# Patient Record
Sex: FEMALE | Race: BLACK OR AFRICAN AMERICAN | NOT HISPANIC OR LATINO | Employment: FULL TIME | URBAN - METROPOLITAN AREA
[De-identification: names, ages, dates, MRNs, and addresses within clinical notes are randomized per-mention and may not be internally consistent; named-entity substitution may affect disease eponyms.]

---

## 2017-01-23 ENCOUNTER — HISTORICAL (OUTPATIENT)
Dept: INTERNAL MEDICINE | Facility: CLINIC | Age: 57
End: 2017-01-23

## 2017-03-10 ENCOUNTER — HISTORICAL (OUTPATIENT)
Dept: INTERNAL MEDICINE | Facility: CLINIC | Age: 57
End: 2017-03-10

## 2017-06-23 ENCOUNTER — HISTORICAL (OUTPATIENT)
Dept: ADMINISTRATIVE | Facility: HOSPITAL | Age: 57
End: 2017-06-23

## 2017-06-23 LAB
ABS NEUT (OLG): 2.98 X10(3)/MCL (ref 2.1–9.2)
BASOPHILS # BLD AUTO: 0.04 X10(3)/MCL
BASOPHILS NFR BLD AUTO: 1 % (ref 0–1)
CHOLEST SERPL-MCNC: 202 MG/DL
CHOLEST/HDLC SERPL: 5.9 {RATIO} (ref 0–4.4)
DEPRECATED CALCIDIOL+CALCIFEROL SERPL-MC: 27.01 NG/ML (ref 30–80)
EOSINOPHIL # BLD AUTO: 0.05 10*3/UL
EOSINOPHIL NFR BLD AUTO: 1 % (ref 0–5)
ERYTHROCYTE [DISTWIDTH] IN BLOOD BY AUTOMATED COUNT: 13.4 % (ref 11.5–14.5)
HCT VFR BLD AUTO: 43 % (ref 35–46)
HDLC SERPL-MCNC: 34 MG/DL
HGB BLD-MCNC: 15.1 GM/DL (ref 12–16)
IMM GRANULOCYTES # BLD AUTO: 0.02 10*3/UL
IMM GRANULOCYTES NFR BLD AUTO: 0 %
LDLC SERPL CALC-MCNC: 96 MG/DL (ref 0–130)
LYMPHOCYTES # BLD AUTO: 2.6 X10(3)/MCL
LYMPHOCYTES NFR BLD AUTO: 42 % (ref 15–40)
MCH RBC QN AUTO: 27.9 PG (ref 26–34)
MCHC RBC AUTO-ENTMCNC: 35.1 GM/DL (ref 31–37)
MCV RBC AUTO: 79.5 FL (ref 80–100)
MONOCYTES # BLD AUTO: 0.47 X10(3)/MCL
MONOCYTES NFR BLD AUTO: 8 % (ref 4–12)
NEUTROPHILS # BLD AUTO: 2.98 X10(3)/MCL
NEUTROPHILS NFR BLD AUTO: 48 X10(3)/MCL
PLATELET # BLD AUTO: 421 X10(3)/MCL (ref 130–400)
PMV BLD AUTO: 9.1 FL (ref 7.4–10.4)
RBC # BLD AUTO: 5.41 X10(6)/MCL (ref 4–5.2)
TRIGL SERPL-MCNC: 358 MG/DL
VLDLC SERPL CALC-MCNC: 72 MG/DL
WBC # SPEC AUTO: 6.2 X10(3)/MCL (ref 4.5–11)

## 2017-07-03 ENCOUNTER — HISTORICAL (OUTPATIENT)
Dept: RADIOLOGY | Facility: HOSPITAL | Age: 57
End: 2017-07-03

## 2017-07-03 LAB
BUN SERPL-MCNC: 18 MG/DL (ref 7–18)
CALCIUM SERPL-MCNC: 9.2 MG/DL (ref 8.5–10.1)
CHLORIDE SERPL-SCNC: 108 MMOL/L (ref 98–107)
CO2 SERPL-SCNC: 26 MMOL/L (ref 21–32)
CREAT SERPL-MCNC: 0.8 MG/DL (ref 0.6–1.3)
GLUCOSE SERPL-MCNC: 109 MG/DL (ref 74–106)
POTASSIUM SERPL-SCNC: 3.4 MMOL/L (ref 3.5–5.1)
SODIUM SERPL-SCNC: 143 MMOL/L (ref 136–145)

## 2017-08-09 ENCOUNTER — HISTORICAL (OUTPATIENT)
Dept: INTERNAL MEDICINE | Facility: CLINIC | Age: 57
End: 2017-08-09

## 2017-08-09 LAB — DEPRECATED CALCIDIOL+CALCIFEROL SERPL-MC: 21.68 NG/ML (ref 30–80)

## 2017-09-27 ENCOUNTER — HISTORICAL (OUTPATIENT)
Dept: INTERNAL MEDICINE | Facility: CLINIC | Age: 57
End: 2017-09-27

## 2017-09-27 LAB
ABS NEUT (OLG): 3.08 X10(3)/MCL (ref 2.1–9.2)
ALBUMIN SERPL-MCNC: 3.6 GM/DL (ref 3.4–5)
ALBUMIN/GLOB SERPL: 1 RATIO (ref 1–2)
ALP SERPL-CCNC: 56 UNIT/L (ref 45–117)
ALT SERPL-CCNC: 20 UNIT/L (ref 12–78)
AST SERPL-CCNC: 13 UNIT/L (ref 15–37)
BASOPHILS # BLD AUTO: 0.05 X10(3)/MCL
BASOPHILS NFR BLD AUTO: 1 % (ref 0–1)
BILIRUB SERPL-MCNC: 0.4 MG/DL (ref 0.2–1)
BILIRUBIN DIRECT+TOT PNL SERPL-MCNC: 0.1 MG/DL
BILIRUBIN DIRECT+TOT PNL SERPL-MCNC: 0.3 MG/DL
BUN SERPL-MCNC: 15 MG/DL (ref 7–18)
CALCIUM SERPL-MCNC: 8.9 MG/DL (ref 8.5–10.1)
CHLORIDE SERPL-SCNC: 106 MMOL/L (ref 98–107)
CHOLEST SERPL-MCNC: 190 MG/DL
CHOLEST/HDLC SERPL: 4.8 {RATIO} (ref 0–4.4)
CO2 SERPL-SCNC: 33 MMOL/L (ref 21–32)
CREAT SERPL-MCNC: 0.7 MG/DL (ref 0.6–1.3)
DEPRECATED CALCIDIOL+CALCIFEROL SERPL-MC: 40.9 NG/ML (ref 30–80)
EOSINOPHIL # BLD AUTO: 0.09 X10(3)/MCL
EOSINOPHIL NFR BLD AUTO: 1 % (ref 0–5)
ERYTHROCYTE [DISTWIDTH] IN BLOOD BY AUTOMATED COUNT: 13.6 % (ref 11.5–14.5)
GLOBULIN SER-MCNC: 4.4 GM/ML (ref 2.3–3.5)
GLUCOSE SERPL-MCNC: 105 MG/DL (ref 74–106)
HCT VFR BLD AUTO: 39 % (ref 35–46)
HDLC SERPL-MCNC: 40 MG/DL
HGB BLD-MCNC: 14 GM/DL (ref 12–16)
IMM GRANULOCYTES # BLD AUTO: 0.01 10*3/UL
IMM GRANULOCYTES NFR BLD AUTO: 0 %
LDLC SERPL CALC-MCNC: 98 MG/DL (ref 0–130)
LYMPHOCYTES # BLD AUTO: 3.18 X10(3)/MCL
LYMPHOCYTES NFR BLD AUTO: 45 % (ref 15–40)
MCH RBC QN AUTO: 28.7 PG (ref 26–34)
MCHC RBC AUTO-ENTMCNC: 35.9 GM/DL (ref 31–37)
MCV RBC AUTO: 79.9 FL (ref 80–100)
MONOCYTES # BLD AUTO: 0.61 X10(3)/MCL
MONOCYTES NFR BLD AUTO: 9 % (ref 4–12)
NEUTROPHILS # BLD AUTO: 3.08 X10(3)/MCL
NEUTROPHILS NFR BLD AUTO: 44 X10(3)/MCL
PLATELET # BLD AUTO: 376 X10(3)/MCL (ref 130–400)
PMV BLD AUTO: 9.1 FL (ref 7.4–10.4)
POTASSIUM SERPL-SCNC: 3.4 MMOL/L (ref 3.5–5.1)
PROT SERPL-MCNC: 8 GM/DL (ref 6.4–8.2)
RBC # BLD AUTO: 4.88 X10(6)/MCL (ref 4–5.2)
SODIUM SERPL-SCNC: 143 MMOL/L (ref 136–145)
TRIGL SERPL-MCNC: 258 MG/DL
VLDLC SERPL CALC-MCNC: 52 MG/DL
WBC # SPEC AUTO: 7 X10(3)/MCL (ref 4.5–11)

## 2017-11-16 ENCOUNTER — HISTORICAL (OUTPATIENT)
Dept: RADIOLOGY | Facility: HOSPITAL | Age: 57
End: 2017-11-16

## 2017-11-16 LAB
ALBUMIN SERPL-MCNC: 3.5 GM/DL (ref 3.4–5)
ALBUMIN/GLOB SERPL: 1 RATIO (ref 1–2)
ALP SERPL-CCNC: 52 UNIT/L (ref 45–117)
ALT SERPL-CCNC: 21 UNIT/L (ref 12–78)
AST SERPL-CCNC: 12 UNIT/L (ref 15–37)
BILIRUB SERPL-MCNC: 0.4 MG/DL (ref 0.2–1)
BILIRUBIN DIRECT+TOT PNL SERPL-MCNC: 0.1 MG/DL
BILIRUBIN DIRECT+TOT PNL SERPL-MCNC: 0.3 MG/DL
BUN SERPL-MCNC: 15 MG/DL (ref 7–18)
CALCIUM SERPL-MCNC: 9.3 MG/DL (ref 8.5–10.1)
CHLORIDE SERPL-SCNC: 106 MMOL/L (ref 98–107)
CO2 SERPL-SCNC: 30 MMOL/L (ref 21–32)
CREAT SERPL-MCNC: 0.7 MG/DL (ref 0.6–1.3)
DEPRECATED CALCIDIOL+CALCIFEROL SERPL-MC: 38.83 NG/ML (ref 30–80)
GLOBULIN SER-MCNC: 4.2 GM/ML (ref 2.3–3.5)
GLUCOSE SERPL-MCNC: 84 MG/DL (ref 74–106)
POTASSIUM SERPL-SCNC: 3.3 MMOL/L (ref 3.5–5.1)
PROT SERPL-MCNC: 7.7 GM/DL (ref 6.4–8.2)
SODIUM SERPL-SCNC: 143 MMOL/L (ref 136–145)

## 2018-02-07 ENCOUNTER — HISTORICAL (OUTPATIENT)
Dept: INTERNAL MEDICINE | Facility: CLINIC | Age: 58
End: 2018-02-07

## 2018-02-07 LAB
BUN SERPL-MCNC: 19 MG/DL (ref 7–18)
CALCIUM SERPL-MCNC: 8.9 MG/DL (ref 8.5–10.1)
CHLORIDE SERPL-SCNC: 106 MMOL/L (ref 98–107)
CO2 SERPL-SCNC: 33 MMOL/L (ref 21–32)
CREAT SERPL-MCNC: 0.9 MG/DL (ref 0.6–1.3)
GLUCOSE SERPL-MCNC: 102 MG/DL (ref 74–106)
POTASSIUM SERPL-SCNC: 3.2 MMOL/L (ref 3.5–5.1)
SODIUM SERPL-SCNC: 142 MMOL/L (ref 136–145)

## 2018-04-19 ENCOUNTER — HISTORICAL (OUTPATIENT)
Dept: ADMINISTRATIVE | Facility: HOSPITAL | Age: 58
End: 2018-04-19

## 2018-05-22 ENCOUNTER — HISTORICAL (OUTPATIENT)
Dept: ADMINISTRATIVE | Facility: HOSPITAL | Age: 58
End: 2018-05-22

## 2018-08-08 ENCOUNTER — HISTORICAL (OUTPATIENT)
Dept: INTERNAL MEDICINE | Facility: CLINIC | Age: 58
End: 2018-08-08

## 2018-08-08 ENCOUNTER — HISTORICAL (OUTPATIENT)
Dept: ADMINISTRATIVE | Facility: HOSPITAL | Age: 58
End: 2018-08-08

## 2018-08-08 LAB
BUN SERPL-MCNC: 15 MG/DL (ref 7–18)
CALCIUM SERPL-MCNC: 8.7 MG/DL (ref 8.5–10.1)
CHLORIDE SERPL-SCNC: 106 MMOL/L (ref 98–107)
CO2 SERPL-SCNC: 30 MMOL/L (ref 21–32)
CREAT SERPL-MCNC: 0.8 MG/DL (ref 0.6–1.3)
CREAT/UREA NIT SERPL: 19
CRP SERPL-MCNC: 0.8 MG/DL
ERYTHROCYTE [SEDIMENTATION RATE] IN BLOOD: 10 MM/HR (ref 0–20)
GLUCOSE SERPL-MCNC: 103 MG/DL (ref 74–106)
POTASSIUM SERPL-SCNC: 3.6 MMOL/L (ref 3.5–5.1)
RHEUMATOID FACT SERPL-ACNC: <10 IU/ML (ref 0–15)
SODIUM SERPL-SCNC: 143 MMOL/L (ref 136–145)

## 2018-11-19 ENCOUNTER — HISTORICAL (OUTPATIENT)
Dept: RADIOLOGY | Facility: HOSPITAL | Age: 58
End: 2018-11-19

## 2018-11-30 ENCOUNTER — HISTORICAL (OUTPATIENT)
Dept: INTERNAL MEDICINE | Facility: CLINIC | Age: 58
End: 2018-11-30

## 2018-11-30 LAB
ABS NEUT (OLG): 4.02 X10(3)/MCL (ref 2.1–9.2)
ALBUMIN SERPL-MCNC: 3.5 GM/DL (ref 3.4–5)
ALBUMIN/GLOB SERPL: 1 RATIO (ref 1–2)
ALP SERPL-CCNC: 59 UNIT/L (ref 45–117)
ALT SERPL-CCNC: 24 UNIT/L (ref 12–78)
APPEARANCE, UA: CLEAR
AST SERPL-CCNC: 18 UNIT/L (ref 15–37)
BACTERIA #/AREA URNS AUTO: ABNORMAL /[HPF]
BASOPHILS # BLD AUTO: 0.05 X10(3)/MCL
BASOPHILS NFR BLD AUTO: 1 %
BILIRUB SERPL-MCNC: 0.3 MG/DL (ref 0.2–1)
BILIRUB UR QL STRIP: NEGATIVE
BILIRUBIN DIRECT+TOT PNL SERPL-MCNC: <0.1 MG/DL
BILIRUBIN DIRECT+TOT PNL SERPL-MCNC: >0.2 MG/DL
BUN SERPL-MCNC: 12 MG/DL (ref 7–18)
CALCIUM SERPL-MCNC: 8.8 MG/DL (ref 8.5–10.1)
CHLORIDE SERPL-SCNC: 106 MMOL/L (ref 98–107)
CHOLEST SERPL-MCNC: 192 MG/DL
CHOLEST/HDLC SERPL: 5.2 {RATIO} (ref 0–4.4)
CO2 SERPL-SCNC: 28 MMOL/L (ref 21–32)
COLOR UR: ABNORMAL
CREAT SERPL-MCNC: 0.9 MG/DL (ref 0.6–1.3)
DEPRECATED CALCIDIOL+CALCIFEROL SERPL-MC: 50.4 NG/ML (ref 30–80)
EOSINOPHIL # BLD AUTO: 0.12 X10(3)/MCL
EOSINOPHIL NFR BLD AUTO: 2 %
ERYTHROCYTE [DISTWIDTH] IN BLOOD BY AUTOMATED COUNT: 13.3 % (ref 11.5–14.5)
GLOBULIN SER-MCNC: 4.5 GM/ML (ref 2.3–3.5)
GLUCOSE (UA): NORMAL
GLUCOSE SERPL-MCNC: 131 MG/DL (ref 74–106)
HCT VFR BLD AUTO: 38.5 % (ref 35–46)
HDLC SERPL-MCNC: 37 MG/DL
HGB BLD-MCNC: 13.7 GM/DL (ref 12–16)
HGB UR QL STRIP: 0.5 MG/DL
HYALINE CASTS #/AREA URNS LPF: ABNORMAL /[LPF]
IMM GRANULOCYTES # BLD AUTO: 0.03 10*3/UL
IMM GRANULOCYTES NFR BLD AUTO: 0 %
KETONES UR QL STRIP: NEGATIVE
LDLC SERPL CALC-MCNC: 112 MG/DL (ref 0–130)
LEUKOCYTE ESTERASE UR QL STRIP: NEGATIVE
LYMPHOCYTES # BLD AUTO: 2.97 X10(3)/MCL
LYMPHOCYTES NFR BLD AUTO: 38 % (ref 13–40)
MCH RBC QN AUTO: 28.7 PG (ref 26–34)
MCHC RBC AUTO-ENTMCNC: 35.6 GM/DL (ref 31–37)
MCV RBC AUTO: 80.7 FL (ref 80–100)
MONOCYTES # BLD AUTO: 0.56 X10(3)/MCL
MONOCYTES NFR BLD AUTO: 7 % (ref 4–12)
NEUTROPHILS # BLD AUTO: 4.02 X10(3)/MCL
NEUTROPHILS NFR BLD AUTO: 52 X10(3)/MCL
NITRITE UR QL STRIP: NEGATIVE
PH UR STRIP: 6 [PH] (ref 4.5–8)
PLATELET # BLD AUTO: 373 X10(3)/MCL (ref 130–400)
PMV BLD AUTO: 9.3 FL (ref 7.4–10.4)
POTASSIUM SERPL-SCNC: 3 MMOL/L (ref 3.5–5.1)
PROT SERPL-MCNC: 8 GM/DL (ref 6.4–8.2)
PROT UR QL STRIP: 100 MG/DL
RBC # BLD AUTO: 4.77 X10(6)/MCL (ref 4–5.2)
RBC #/AREA URNS AUTO: ABNORMAL /[HPF]
SODIUM SERPL-SCNC: 140 MMOL/L (ref 136–145)
SP GR UR STRIP: 1.01 (ref 1–1.03)
SQUAMOUS #/AREA URNS LPF: ABNORMAL /[LPF]
TRIGL SERPL-MCNC: 215 MG/DL
UROBILINOGEN UR STRIP-ACNC: NORMAL
VLDLC SERPL CALC-MCNC: 43 MG/DL
WBC # SPEC AUTO: 7.8 X10(3)/MCL (ref 4.5–11)
WBC #/AREA URNS AUTO: ABNORMAL /HPF

## 2019-05-06 ENCOUNTER — HISTORICAL (OUTPATIENT)
Dept: ADMINISTRATIVE | Facility: HOSPITAL | Age: 59
End: 2019-05-06

## 2019-05-06 LAB
BUN SERPL-MCNC: 17 MG/DL (ref 7–18)
CALCIUM SERPL-MCNC: 9.2 MG/DL (ref 8.5–10.1)
CHLORIDE SERPL-SCNC: 108 MMOL/L (ref 98–107)
CO2 SERPL-SCNC: 28 MMOL/L (ref 21–32)
CREAT SERPL-MCNC: 0.7 MG/DL (ref 0.6–1.3)
CREAT/UREA NIT SERPL: 24
GLUCOSE SERPL-MCNC: 89 MG/DL (ref 74–106)
POTASSIUM SERPL-SCNC: 3.7 MMOL/L (ref 3.5–5.1)
SODIUM SERPL-SCNC: 142 MMOL/L (ref 136–145)

## 2019-05-21 ENCOUNTER — HISTORICAL (OUTPATIENT)
Dept: RADIOLOGY | Facility: HOSPITAL | Age: 59
End: 2019-05-21

## 2019-10-21 ENCOUNTER — HISTORICAL (OUTPATIENT)
Dept: ENDOSCOPY | Facility: HOSPITAL | Age: 59
End: 2019-10-21

## 2019-10-21 LAB — CRC RECOMMENDATION EXT: NORMAL

## 2019-11-21 ENCOUNTER — HISTORICAL (OUTPATIENT)
Dept: RADIOLOGY | Facility: HOSPITAL | Age: 59
End: 2019-11-21

## 2019-12-05 ENCOUNTER — HISTORICAL (OUTPATIENT)
Dept: INTERNAL MEDICINE | Facility: CLINIC | Age: 59
End: 2019-12-05

## 2019-12-05 LAB
ABS NEUT (OLG): 2.47 X10(3)/MCL (ref 2.1–9.2)
APPEARANCE, UA: CLEAR
BACTERIA #/AREA URNS AUTO: ABNORMAL /HPF
BASOPHILS # BLD AUTO: 0 X10(3)/MCL (ref 0–0.2)
BASOPHILS NFR BLD AUTO: 1 %
BILIRUB UR QL STRIP: NEGATIVE
CHOLEST SERPL-MCNC: 180 MG/DL
CHOLEST/HDLC SERPL: 5.3 {RATIO} (ref 0–4.4)
COLOR UR: ABNORMAL
DEPRECATED CALCIDIOL+CALCIFEROL SERPL-MC: 35.95 NG/ML (ref 30–80)
EOSINOPHIL # BLD AUTO: 0.1 X10(3)/MCL (ref 0–0.9)
EOSINOPHIL NFR BLD AUTO: 1 %
ERYTHROCYTE [DISTWIDTH] IN BLOOD BY AUTOMATED COUNT: 13.7 % (ref 11.5–14.5)
EST. AVERAGE GLUCOSE BLD GHB EST-MCNC: 126 MG/DL
GLUCOSE (UA): NEGATIVE
HBA1C MFR BLD: 6 % (ref 4.2–6.3)
HCT VFR BLD AUTO: 40.1 % (ref 35–46)
HDLC SERPL-MCNC: 34 MG/DL (ref 40–59)
HGB BLD-MCNC: 14 GM/DL (ref 12–16)
HGB UR QL STRIP: 0.2
HYALINE CASTS #/AREA URNS LPF: ABNORMAL /LPF
IMM GRANULOCYTES # BLD AUTO: 0.02 10*3/UL
IMM GRANULOCYTES NFR BLD AUTO: 0 %
KETONES UR QL STRIP: NEGATIVE
LDLC SERPL CALC-MCNC: 72 MG/DL
LEUKOCYTE ESTERASE UR QL STRIP: NEGATIVE
LYMPHOCYTES # BLD AUTO: 3.2 X10(3)/MCL (ref 0.6–4.6)
LYMPHOCYTES NFR BLD AUTO: 50 %
MCH RBC QN AUTO: 28.5 PG (ref 26–34)
MCHC RBC AUTO-ENTMCNC: 34.9 GM/DL (ref 31–37)
MCV RBC AUTO: 81.5 FL (ref 80–100)
MONOCYTES # BLD AUTO: 0.7 X10(3)/MCL (ref 0.1–1.3)
MONOCYTES NFR BLD AUTO: 10 %
NEUTROPHILS # BLD AUTO: 2.47 X10(3)/MCL (ref 2.1–9.2)
NEUTROPHILS NFR BLD AUTO: 38 %
NITRITE UR QL STRIP: NEGATIVE
PH UR STRIP: 6.5 [PH] (ref 4.5–8)
PLATELET # BLD AUTO: 384 X10(3)/MCL (ref 130–400)
PMV BLD AUTO: 9.3 FL (ref 7.4–10.4)
PROT UR QL STRIP: 100 MG/DL
RBC # BLD AUTO: 4.92 X10(6)/MCL (ref 4–5.2)
RBC #/AREA URNS AUTO: ABNORMAL /HPF
SP GR UR STRIP: 1.01 (ref 1–1.03)
SQUAMOUS #/AREA URNS LPF: ABNORMAL /LPF
TRIGL SERPL-MCNC: 372 MG/DL
TSH SERPL-ACNC: 1.66 MIU/L (ref 0.36–3.74)
UROBILINOGEN UR STRIP-ACNC: NORMAL
VLDLC SERPL CALC-MCNC: 74 MG/DL
WBC # SPEC AUTO: 6.5 X10(3)/MCL (ref 4.5–11)
WBC #/AREA URNS AUTO: ABNORMAL /HPF

## 2020-03-05 ENCOUNTER — HISTORICAL (OUTPATIENT)
Dept: INTERNAL MEDICINE | Facility: CLINIC | Age: 60
End: 2020-03-05

## 2020-03-05 LAB
APPEARANCE, UA: CLEAR
BACTERIA #/AREA URNS AUTO: ABNORMAL /HPF
BILIRUB UR QL STRIP: NEGATIVE
CHOLEST SERPL-MCNC: 198 MG/DL
CHOLEST/HDLC SERPL: 4.8 {RATIO} (ref 0–4.4)
COLOR UR: ABNORMAL
EST. AVERAGE GLUCOSE BLD GHB EST-MCNC: 111 MG/DL
GLUCOSE (UA): NEGATIVE
HBA1C MFR BLD: 5.5 % (ref 4.2–6.3)
HDLC SERPL-MCNC: 41 MG/DL (ref 40–59)
HGB UR QL STRIP: 0.2
HYALINE CASTS #/AREA URNS LPF: ABNORMAL /LPF
KETONES UR QL STRIP: NEGATIVE
LDLC SERPL CALC-MCNC: 118 MG/DL
LEUKOCYTE ESTERASE UR QL STRIP: NEGATIVE
NITRITE UR QL STRIP: NEGATIVE
PH UR STRIP: 6.5 [PH] (ref 4.5–8)
PROT UR QL STRIP: 200 MG/DL
RBC #/AREA URNS AUTO: ABNORMAL /HPF
SP GR UR STRIP: 1.01 (ref 1–1.03)
SQUAMOUS #/AREA URNS LPF: ABNORMAL /LPF
TRIGL SERPL-MCNC: 194 MG/DL
UROBILINOGEN UR STRIP-ACNC: NORMAL
VLDLC SERPL CALC-MCNC: 39 MG/DL
WBC #/AREA URNS AUTO: ABNORMAL /HPF

## 2020-03-07 LAB — FINAL CULTURE: NO GROWTH

## 2020-03-10 ENCOUNTER — HISTORICAL (OUTPATIENT)
Dept: ADMINISTRATIVE | Facility: HOSPITAL | Age: 60
End: 2020-03-10

## 2020-05-19 ENCOUNTER — HISTORICAL (OUTPATIENT)
Dept: INTERNAL MEDICINE | Facility: CLINIC | Age: 60
End: 2020-05-19

## 2020-05-19 LAB
ALBUMIN SERPL-MCNC: 3.2 GM/DL (ref 3.4–5)
ALBUMIN/GLOB SERPL: 0.7 RATIO (ref 1.1–2)
ALP SERPL-CCNC: 49 UNIT/L (ref 45–117)
ALT SERPL-CCNC: 19 UNIT/L (ref 12–78)
AST SERPL-CCNC: 12 UNIT/L (ref 15–37)
BILIRUB SERPL-MCNC: 0.5 MG/DL (ref 0.2–1)
BILIRUBIN DIRECT+TOT PNL SERPL-MCNC: <0.1 MG/DL (ref 0–0.2)
BILIRUBIN DIRECT+TOT PNL SERPL-MCNC: ABNORMAL MG/DL
BUN SERPL-MCNC: 24 MG/DL (ref 7–18)
CALCIUM SERPL-MCNC: 9.1 MG/DL (ref 8.5–10.1)
CHLORIDE SERPL-SCNC: 109 MMOL/L (ref 98–107)
CHOLEST SERPL-MCNC: 169 MG/DL
CHOLEST/HDLC SERPL: 5.3 {RATIO} (ref 0–4.4)
CO2 SERPL-SCNC: 27 MMOL/L (ref 21–32)
CREAT SERPL-MCNC: 0.8 MG/DL (ref 0.6–1.3)
GLOBULIN SER-MCNC: 4.3 GM/ML (ref 2.3–3.5)
GLUCOSE SERPL-MCNC: 103 MG/DL (ref 74–106)
HDLC SERPL-MCNC: 32 MG/DL (ref 40–59)
LDLC SERPL CALC-MCNC: 89 MG/DL
POTASSIUM SERPL-SCNC: 3.5 MMOL/L (ref 3.5–5.1)
PROT SERPL-MCNC: 7.5 GM/DL (ref 6.4–8.2)
SODIUM SERPL-SCNC: 142 MMOL/L (ref 136–145)
TRIGL SERPL-MCNC: 239 MG/DL
VLDLC SERPL CALC-MCNC: 48 MG/DL

## 2020-05-20 ENCOUNTER — HISTORICAL (OUTPATIENT)
Dept: ADMINISTRATIVE | Facility: HOSPITAL | Age: 60
End: 2020-05-20

## 2020-05-20 LAB
APPEARANCE, UA: CLEAR
BACTERIA #/AREA URNS AUTO: ABNORMAL /HPF
BILIRUB UR QL STRIP: NEGATIVE
COLOR UR: ABNORMAL
EST. AVERAGE GLUCOSE BLD GHB EST-MCNC: 134 MG/DL
GLUCOSE (UA): NEGATIVE
HBA1C MFR BLD: 6.3 % (ref 4.2–6.3)
HGB UR QL STRIP: 0.2
HYALINE CASTS #/AREA URNS LPF: ABNORMAL /LPF
KETONES UR QL STRIP: NEGATIVE
LEUKOCYTE ESTERASE UR QL STRIP: NEGATIVE
NITRITE UR QL STRIP: NEGATIVE
PH UR STRIP: 5.5 [PH] (ref 4.5–8)
PROT UR QL STRIP: 30 MG/DL
RBC #/AREA URNS AUTO: ABNORMAL /HPF
SP GR UR STRIP: 1.01 (ref 1–1.03)
SQUAMOUS #/AREA URNS LPF: ABNORMAL /LPF
UROBILINOGEN UR STRIP-ACNC: NORMAL
WBC #/AREA URNS AUTO: ABNORMAL /HPF

## 2020-05-22 LAB — FINAL CULTURE: NO GROWTH

## 2020-06-01 LAB
HPV16+18+H RISK 12 DNA CVX-IMP: NEGATIVE
PAP RECOMMENDATION EXT: NORMAL
PAP SMEAR: NORMAL

## 2020-06-11 ENCOUNTER — HISTORICAL (OUTPATIENT)
Dept: RADIOLOGY | Facility: HOSPITAL | Age: 60
End: 2020-06-11

## 2020-08-11 ENCOUNTER — HISTORICAL (OUTPATIENT)
Dept: WOUND CARE | Facility: HOSPITAL | Age: 60
End: 2020-08-11

## 2020-08-18 ENCOUNTER — HISTORICAL (OUTPATIENT)
Dept: INTERNAL MEDICINE | Facility: CLINIC | Age: 60
End: 2020-08-18

## 2020-08-18 LAB
BUN SERPL-MCNC: 15 MG/DL (ref 7–18)
CALCIUM SERPL-MCNC: 9.5 MG/DL (ref 8.5–10.1)
CHLORIDE SERPL-SCNC: 110 MMOL/L (ref 98–107)
CHOLEST SERPL-MCNC: 186 MG/DL
CHOLEST/HDLC SERPL: 5.8 {RATIO} (ref 0–4.4)
CO2 SERPL-SCNC: 26 MMOL/L (ref 21–32)
CREAT SERPL-MCNC: 0.7 MG/DL (ref 0.6–1.3)
CREAT/UREA NIT SERPL: 21 MG/DL (ref 12–14)
GLUCOSE SERPL-MCNC: 112 MG/DL (ref 74–106)
HDLC SERPL-MCNC: 32 MG/DL (ref 40–59)
LDLC SERPL CALC-MCNC: 106 MG/DL
POTASSIUM SERPL-SCNC: 3.6 MMOL/L (ref 3.5–5.1)
SODIUM SERPL-SCNC: 143 MMOL/L (ref 136–145)
TRIGL SERPL-MCNC: 242 MG/DL
VLDLC SERPL CALC-MCNC: 48 MG/DL

## 2020-11-17 ENCOUNTER — HISTORICAL (OUTPATIENT)
Dept: INTERNAL MEDICINE | Facility: CLINIC | Age: 60
End: 2020-11-17

## 2020-11-17 LAB
BUN SERPL-MCNC: 16 MG/DL (ref 9.8–20.1)
CALCIUM SERPL-MCNC: 9.1 MG/DL (ref 8.4–10.2)
CHLORIDE SERPL-SCNC: 103 MMOL/L (ref 98–107)
CHOLEST SERPL-MCNC: 179 MG/DL
CHOLEST/HDLC SERPL: 5 {RATIO} (ref 0–5)
CO2 SERPL-SCNC: 29 MMOL/L (ref 23–31)
CREAT SERPL-MCNC: 0.82 MG/DL (ref 0.55–1.02)
CREAT/UREA NIT SERPL: 20
EST. AVERAGE GLUCOSE BLD GHB EST-MCNC: 111.2 MG/DL
GLUCOSE SERPL-MCNC: 100 MG/DL (ref 82–115)
HBA1C MFR BLD: 5.5 %
HDLC SERPL-MCNC: 35 MG/DL (ref 35–60)
LDLC SERPL CALC-MCNC: 100 MG/DL (ref 50–140)
POTASSIUM SERPL-SCNC: 3.6 MMOL/L (ref 3.5–5.1)
SODIUM SERPL-SCNC: 140 MMOL/L (ref 136–145)
TRIGL SERPL-MCNC: 218 MG/DL (ref 37–140)
VLDLC SERPL CALC-MCNC: 44 MG/DL

## 2020-11-23 ENCOUNTER — HISTORICAL (OUTPATIENT)
Dept: RADIOLOGY | Facility: HOSPITAL | Age: 60
End: 2020-11-23

## 2021-02-17 ENCOUNTER — HISTORICAL (OUTPATIENT)
Dept: INTERNAL MEDICINE | Facility: CLINIC | Age: 61
End: 2021-02-17

## 2021-02-17 LAB
ABS NEUT (OLG): 2.57 X10(3)/MCL (ref 2.1–9.2)
BASOPHILS # BLD AUTO: 0 X10(3)/MCL (ref 0–0.2)
BASOPHILS NFR BLD AUTO: 1 %
BUN SERPL-MCNC: 16 MG/DL (ref 9.8–20.1)
CALCIUM SERPL-MCNC: 9.4 MG/DL (ref 8.4–10.2)
CHLORIDE SERPL-SCNC: 105 MMOL/L (ref 98–107)
CHOLEST SERPL-MCNC: 199 MG/DL
CHOLEST/HDLC SERPL: 5 {RATIO} (ref 0–5)
CO2 SERPL-SCNC: 26 MMOL/L (ref 23–31)
CREAT SERPL-MCNC: 0.73 MG/DL (ref 0.55–1.02)
CREAT/UREA NIT SERPL: 22
EOSINOPHIL # BLD AUTO: 0 X10(3)/MCL (ref 0–0.9)
EOSINOPHIL NFR BLD AUTO: 1 %
ERYTHROCYTE [DISTWIDTH] IN BLOOD BY AUTOMATED COUNT: 13.9 % (ref 11.5–14.5)
GLUCOSE SERPL-MCNC: 95 MG/DL (ref 82–115)
HCT VFR BLD AUTO: 41.7 % (ref 35–46)
HDLC SERPL-MCNC: 38 MG/DL (ref 35–60)
HGB BLD-MCNC: 14.3 GM/DL (ref 12–16)
IMM GRANULOCYTES # BLD AUTO: 0.02 10*3/UL
IMM GRANULOCYTES NFR BLD AUTO: 0 %
LDLC SERPL CALC-MCNC: 110 MG/DL (ref 50–140)
LYMPHOCYTES # BLD AUTO: 3.1 X10(3)/MCL (ref 0.6–4.6)
LYMPHOCYTES NFR BLD AUTO: 49 %
MCH RBC QN AUTO: 28.2 PG (ref 26–34)
MCHC RBC AUTO-ENTMCNC: 34.3 GM/DL (ref 31–37)
MCV RBC AUTO: 82.2 FL (ref 80–100)
MONOCYTES # BLD AUTO: 0.6 X10(3)/MCL (ref 0.1–1.3)
MONOCYTES NFR BLD AUTO: 9 %
NEUTROPHILS # BLD AUTO: 2.57 X10(3)/MCL (ref 2.1–9.2)
NEUTROPHILS NFR BLD AUTO: 40 %
PLATELET # BLD AUTO: 392 X10(3)/MCL (ref 130–400)
PMV BLD AUTO: 8.9 FL (ref 7.4–10.4)
POTASSIUM SERPL-SCNC: 3.8 MMOL/L (ref 3.5–5.1)
RBC # BLD AUTO: 5.07 X10(6)/MCL (ref 4–5.2)
SODIUM SERPL-SCNC: 141 MMOL/L (ref 136–145)
TRIGL SERPL-MCNC: 253 MG/DL (ref 37–140)
TSH SERPL-ACNC: 1.48 UIU/ML (ref 0.35–4.94)
VLDLC SERPL CALC-MCNC: 51 MG/DL
WBC # SPEC AUTO: 6.4 X10(3)/MCL (ref 4.5–11)

## 2021-02-22 ENCOUNTER — HISTORICAL (OUTPATIENT)
Dept: ADMINISTRATIVE | Facility: HOSPITAL | Age: 61
End: 2021-02-22

## 2021-04-29 ENCOUNTER — HISTORICAL (OUTPATIENT)
Dept: ADMINISTRATIVE | Facility: HOSPITAL | Age: 61
End: 2021-04-29

## 2021-05-08 ENCOUNTER — HISTORICAL (OUTPATIENT)
Dept: LAB | Facility: HOSPITAL | Age: 61
End: 2021-05-08

## 2021-05-08 LAB
ALBUMIN SERPL-MCNC: 3.4 GM/DL (ref 3.4–4.8)
ALBUMIN/GLOB SERPL: 0.9 RATIO (ref 1.1–2)
ALP SERPL-CCNC: 50 UNIT/L (ref 40–150)
ALT SERPL-CCNC: 10 UNIT/L (ref 0–55)
AST SERPL-CCNC: 14 UNIT/L (ref 5–34)
BILIRUB SERPL-MCNC: 0.4 MG/DL
BILIRUBIN DIRECT+TOT PNL SERPL-MCNC: 0.1 MG/DL (ref 0–0.5)
BILIRUBIN DIRECT+TOT PNL SERPL-MCNC: 0.3 MG/DL (ref 0–0.8)
BUN SERPL-MCNC: 19.7 MG/DL (ref 9.8–20.1)
CALCIUM SERPL-MCNC: 10 MG/DL (ref 8.4–10.2)
CHLORIDE SERPL-SCNC: 104 MMOL/L (ref 98–107)
CHOLEST SERPL-MCNC: 194 MG/DL
CHOLEST/HDLC SERPL: 5 {RATIO} (ref 0–5)
CO2 SERPL-SCNC: 28 MMOL/L (ref 23–31)
CREAT SERPL-MCNC: 0.78 MG/DL (ref 0.55–1.02)
DEPRECATED CALCIDIOL+CALCIFEROL SERPL-MC: 39.3 NG/ML (ref 30–80)
GLOBULIN SER-MCNC: 3.6 GM/DL (ref 2.4–3.5)
GLUCOSE SERPL-MCNC: 82 MG/DL (ref 82–115)
HDLC SERPL-MCNC: 38 MG/DL (ref 35–60)
LDLC SERPL CALC-MCNC: 81 MG/DL (ref 50–140)
POTASSIUM SERPL-SCNC: 3.7 MMOL/L (ref 3.5–5.1)
PROT SERPL-MCNC: 7 GM/DL (ref 5.8–7.6)
SODIUM SERPL-SCNC: 141 MMOL/L (ref 136–145)
TRIGL SERPL-MCNC: 377 MG/DL (ref 37–140)
VLDLC SERPL CALC-MCNC: 75 MG/DL

## 2021-05-10 ENCOUNTER — HISTORICAL (OUTPATIENT)
Dept: RADIOLOGY | Facility: HOSPITAL | Age: 61
End: 2021-05-10

## 2021-05-11 ENCOUNTER — HISTORICAL (OUTPATIENT)
Dept: RADIOLOGY | Facility: HOSPITAL | Age: 61
End: 2021-05-11

## 2021-06-02 LAB — PAP RECOMMENDATION EXT: NORMAL

## 2021-07-20 ENCOUNTER — HISTORICAL (OUTPATIENT)
Dept: RADIOLOGY | Facility: HOSPITAL | Age: 61
End: 2021-07-20

## 2021-08-24 ENCOUNTER — HISTORICAL (OUTPATIENT)
Dept: INTERNAL MEDICINE | Facility: CLINIC | Age: 61
End: 2021-08-24

## 2021-08-24 LAB
BUN SERPL-MCNC: 18.7 MG/DL (ref 9.8–20.1)
CALCIUM SERPL-MCNC: 9.8 MG/DL (ref 8.4–10.2)
CHLORIDE SERPL-SCNC: 108 MMOL/L (ref 98–107)
CHOLEST SERPL-MCNC: 192 MG/DL
CHOLEST/HDLC SERPL: 6 {RATIO} (ref 0–5)
CO2 SERPL-SCNC: 26 MMOL/L (ref 23–31)
CREAT SERPL-MCNC: 0.73 MG/DL (ref 0.55–1.02)
CREAT/UREA NIT SERPL: 26
GLUCOSE SERPL-MCNC: 107 MG/DL (ref 82–115)
HDLC SERPL-MCNC: 32 MG/DL (ref 35–60)
LDLC SERPL CALC-MCNC: 120 MG/DL (ref 50–140)
POTASSIUM SERPL-SCNC: 3.3 MMOL/L (ref 3.5–5.1)
SODIUM SERPL-SCNC: 144 MMOL/L (ref 136–145)
TRIGL SERPL-MCNC: 202 MG/DL (ref 37–140)
VLDLC SERPL CALC-MCNC: 40 MG/DL

## 2021-10-14 ENCOUNTER — HISTORICAL (OUTPATIENT)
Dept: RADIOLOGY | Facility: HOSPITAL | Age: 61
End: 2021-10-14

## 2021-10-27 ENCOUNTER — HISTORICAL (OUTPATIENT)
Dept: RADIOLOGY | Facility: HOSPITAL | Age: 61
End: 2021-10-27

## 2021-11-22 ENCOUNTER — HISTORICAL (OUTPATIENT)
Dept: INTERNAL MEDICINE | Facility: CLINIC | Age: 61
End: 2021-11-22

## 2021-11-22 LAB
BUN SERPL-MCNC: 10.3 MG/DL (ref 9.8–20.1)
CALCIUM SERPL-MCNC: 9.6 MG/DL (ref 8.7–10.5)
CHLORIDE SERPL-SCNC: 105 MMOL/L (ref 98–107)
CHOLEST SERPL-MCNC: 191 MG/DL
CHOLEST/HDLC SERPL: 6 {RATIO} (ref 0–5)
CO2 SERPL-SCNC: 28 MMOL/L (ref 23–31)
CREAT SERPL-MCNC: 0.74 MG/DL (ref 0.55–1.02)
CREAT/UREA NIT SERPL: 14
EST. AVERAGE GLUCOSE BLD GHB EST-MCNC: 105.4 MG/DL
GLUCOSE SERPL-MCNC: 83 MG/DL (ref 82–115)
HBA1C MFR BLD: 5.3 %
HDLC SERPL-MCNC: 32 MG/DL (ref 35–60)
LDLC SERPL CALC-MCNC: 96 MG/DL (ref 50–140)
POTASSIUM SERPL-SCNC: 3.5 MMOL/L (ref 3.5–5.1)
SODIUM SERPL-SCNC: 142 MMOL/L (ref 136–145)
TRIGL SERPL-MCNC: 314 MG/DL (ref 37–140)
VLDLC SERPL CALC-MCNC: 63 MG/DL

## 2021-11-23 ENCOUNTER — HISTORICAL (OUTPATIENT)
Dept: ADMINISTRATIVE | Facility: HOSPITAL | Age: 61
End: 2021-11-23

## 2021-11-29 ENCOUNTER — HISTORICAL (OUTPATIENT)
Dept: RADIOLOGY | Facility: HOSPITAL | Age: 61
End: 2021-11-29

## 2022-02-23 ENCOUNTER — HISTORICAL (OUTPATIENT)
Dept: INTERNAL MEDICINE | Facility: CLINIC | Age: 62
End: 2022-02-23

## 2022-04-11 ENCOUNTER — HISTORICAL (OUTPATIENT)
Dept: ADMINISTRATIVE | Facility: HOSPITAL | Age: 62
End: 2022-04-11

## 2022-04-29 VITALS
WEIGHT: 209.88 LBS | HEIGHT: 64 IN | OXYGEN SATURATION: 99 % | WEIGHT: 211.63 LBS | BODY MASS INDEX: 38.94 KG/M2 | SYSTOLIC BLOOD PRESSURE: 136 MMHG | DIASTOLIC BLOOD PRESSURE: 84 MMHG | BODY MASS INDEX: 35.83 KG/M2 | SYSTOLIC BLOOD PRESSURE: 107 MMHG | HEIGHT: 62 IN | DIASTOLIC BLOOD PRESSURE: 63 MMHG
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
## 2022-05-05 NOTE — HISTORICAL [facility] CERNER
This is a historical note converted from [employer]. Formatting and pictures may have been removed.  Please reference Cervishnu for original formatting and attached multimedia. Chief Complaint  follow up  History of Present Illness  59?y/o female here for f/u. Pt has hx Cardiomegaly, Hematuria, HTN, Right pulm nodule, Recurrent kidney stones, SOB. Pt followed in Cardio Cl. [1] Pt states she has been having pain with urination X 3 weeks. Pt has hx of kidney stones in past. Pt has prior CT Abd and pelvis  Review of Systems  Constitutional: negative except as stated in HPI  Eye: negative except as stated in HPI  ENT: negative except as stated in HPI  Respiratory: negative except as stated in HPI  Cardiovascular: negative except as stated in HPI  Gastrointestinal: negative except as stated in HPI  Genitourinary: negative except as stated in HPI  Heme/Lymph: negative except as stated in HPI  Endocrine: negative except as stated in HPI  Immunologic: negative except as stated in HPI  Musculoskeletal: negative except as stated in HPI  Integumentary: negative except as stated in HPI  Neurologic: negative except as stated in HPI  ?   All Other ROS_ negative except as stated in HPI  ?  Physical Exam  Vitals & Measurements  T:?37.0? ?C (Oral)? HR:?64(Peripheral)? RR:?18? BP:?107/63?  HT:?157?cm? WT:?96?kg? BMI:?38.95?  General: Alert and oriented, No acute distress.  Eye: Pupils are equal, round and reactive to light, Extraocular movements are intact.  HENT: Normocephalic.  Neck: Supple, Non-tender, No carotid bruit, No lymphadenopathy.  Respiratory: Lungs are clear to auscultation, Respirations are non-labored, Breath sounds are equal, Symmetrical chest wall expansion.  Cardiovascular: Normal rate, Regular rhythm, No murmur.  Gastrointestinal: Soft, Non-tender, Non-distended, Normal bowel sounds.  Musculoskeletal: Normal range of motion.  Integumentary: Warm, Dry, Intact.  Neurologic: No focal deficits, Cranial Nerves II-XII are  grossly intact.  Assessment/Plan  Bilateral hand numbness?R20.0  ???Pt was referred to [facility] for nerve conduction testing to bilat UE. Will refer to Ortho pending results. [facility]?has hold on referral due to accident with MD. Pt informed of wait and will be notified when appts resumed. [2]  Ordered:  Clinic Follow up, *Est. 08/20/20 3:00:00 CDT, in 3 months with MELI Campbell, Order for future visit, Bilateral hand numbness  Bilateral hand pain  Cardiomegaly  Ganglion cyst  Heel pain, bilateral  Hematuria  HLD (hyperlipidemia)  HTN (hypertension)  Hypokalemia...  ?  Bilateral hand pain?M79.641  ?? ?Cont pain med as prescribed. Pt referred to [facility] for nerve conduction to bilat UE. [3]  Ordered:  Clinic Follow up, *Est. 08/20/20 3:00:00 CDT, in 3 months with MELI Campbell, Order for future visit, Bilateral hand numbness  Bilateral hand pain  Cardiomegaly  Ganglion cyst  Heel pain, bilateral  Hematuria  HLD (hyperlipidemia)  HTN (hypertension)  Hypokalemia...  ?  Cardiomegaly?I51.7  ???Pt followed in Cardio Cl. Keep appts [4]  Ordered:  1160F- Medication reconciliation completed during visit, Cardiomegaly  Ganglion cyst  Heel pain, bilateral  Hematuria  HLD (hyperlipidemia)  HTN (hypertension)  Hypokalemia  Left leg pain  Obesity  Positive FIT (fecal immunochemical test)  Prediabetes  Pulmonary nodule, right  Recurrent kidney s...  Clinic Follow up, *Est. 08/20/20 3:00:00 CDT, in 3 months with MELI Campbell, Order for future visit, Bilateral hand numbness  Bilateral hand pain  Cardiomegaly  Ganglion cyst  Heel pain, bilateral  Hematuria  HLD (hyperlipidemia)  HTN (hypertension)  Hypokalemia...  Office/Outpatient Visit Level 4 Established [id number] PC, Cardiomegaly  Ganglion cyst  Heel pain, bilateral  Hematuria  HLD (hyperlipidemia)  HTN (hypertension)  Hypokalemia  Left leg pain  Obesity  Positive FIT (fecal immunochemical test)  Prediabetes  Pulmonary  nodule, right  Recurrent kidney s...  ?  Dysuria?R30.0  ?UA C&S cyto today. RX Bactrim DS 1 tab po BID X 10 days. Drink plenty of water.  Ordered:  sulfamethoxazole-trimethoprim, 1 tab(s), Oral, BID, X 10 day(s), # 20 tab(s), 0 Refill(s), Pharmacy: [id number], 157, cm, Height/Length Dosing, 05/20/20 8:09:00 CDT, 96, kg, Weight Dosing, 05/20/20 8:09:00 CDT  1160F- Medication reconciliation completed during visit, Cardiomegaly  Ganglion cyst  Heel pain, bilateral  Hematuria  HLD (hyperlipidemia)  HTN (hypertension)  Hypokalemia  Left leg pain  Obesity  Positive FIT (fecal immunochemical test)  Prediabetes  Pulmonary nodule, right  Recurrent kidney s...  Office/Outpatient Visit Level 4 Established [id number] PC, Cardiomegaly  Ganglion cyst  Heel pain, bilateral  Hematuria  HLD (hyperlipidemia)  HTN (hypertension)  Hypokalemia  Left leg pain  Obesity  Positive FIT (fecal immunochemical test)  Prediabetes  Pulmonary nodule, right  Recurrent kidney s...  ?  Ganglion cyst?M67.40  ????Pt states currently ganglion has resolved due to draining from Ortho cl. Pt will monitor. [5]  Ordered:  1160F- Medication reconciliation completed during visit, Cardiomegaly  Ganglion cyst  Heel pain, bilateral  Hematuria  HLD (hyperlipidemia)  HTN (hypertension)  Hypokalemia  Left leg pain  Obesity  Positive FIT (fecal immunochemical test)  Prediabetes  Pulmonary nodule, right  Recurrent kidney s...  Clinic Follow up, *Est. 08/20/20 3:00:00 CDT, in 3 months with MELI Campbell, Order for future visit, Bilateral hand numbness  Bilateral hand pain  Cardiomegaly  Ganglion cyst  Heel pain, bilateral  Hematuria  HLD (hyperlipidemia)  HTN (hypertension)  Hypokalemia...  Office/Outpatient Visit Level 4 Established [id number] PC, Cardiomegaly  Ganglion cyst  Heel pain, bilateral  Hematuria  HLD (hyperlipidemia)  HTN (hypertension)  Hypokalemia  Left leg pain  Obesity  Positive FIT (fecal  immunochemical test)  Prediabetes  Pulmonary nodule, right  Recurrent kidney s...  ?  Heel pain, bilateral?M79.671  ?BMI is 39.56 which is considered obese. Encouraged wt loss, heel stretches, ice massage, heel insoles, continue Meloxicam as prescribed prn pain.? [6]  Ordered:  1160F- Medication reconciliation completed during visit, Cardiomegaly  Ganglion cyst  Heel pain, bilateral  Hematuria  HLD (hyperlipidemia)  HTN (hypertension)  Hypokalemia  Left leg pain  Obesity  Positive FIT (fecal immunochemical test)  Prediabetes  Pulmonary nodule, right  Recurrent kidney s...  Clinic Follow up, *Est. 08/20/20 3:00:00 CDT, in 3 months with MELI Campbell, Order for future visit, Bilateral hand numbness  Bilateral hand pain  Cardiomegaly  Ganglion cyst  Heel pain, bilateral  Hematuria  HLD (hyperlipidemia)  HTN (hypertension)  Hypokalemia...  Office/Outpatient Visit Level 4 Established [id number] PC, Cardiomegaly  Ganglion cyst  Heel pain, bilateral  Hematuria  HLD (hyperlipidemia)  HTN (hypertension)  Hypokalemia  Left leg pain  Obesity  Positive FIT (fecal immunochemical test)  Prediabetes  Pulmonary nodule, right  Recurrent kidney s...  ?  Hematuria?R31.9  ??Pt asymptomatic.?Urine culture?WNL.?Urine cytology ?Atypical urothelial cells with degenerative changes.  - Please resubmit a fresh specimen if clinically indicated.  ?Pt has hx recurrent kidney stones. encouraged to drink plenty of water. Pt NS CT Abd and pelvis with and without contrast?4-27-20. Will reorder CT in 2 weeks. Will re-refer to URO cl?for eval and mgmt. Pt states she had to have Lithotripsy done?twice- last txmt 10 yrs ago.?  Pt states she is?having pain?with?urination?X 3 weeks. Denies seeing blood in urine. RX Bactrim DS 1 tab po BID X 10 days. Drink plenty of water. Will repeat UA?C&S?cyto today.  Ordered:  1160F- Medication reconciliation completed during visit, Cardiomegaly  Ganglion cyst  Heel pain, bilateral   Hematuria  HLD (hyperlipidemia)  HTN (hypertension)  Hypokalemia  Left leg pain  Obesity  Positive FIT (fecal immunochemical test)  Prediabetes  Pulmonary nodule, right  Recurrent kidney s...  Basic Metabolic Panel, Routine collect, *Est. 06/03/20 3:00:00 CDT, Blood, Order for future visit, *Est. Stop date 06/03/20 3:00:00 CDT, Lab Collect, Hematuria, 05/20/20 8:32:00 CDT  Clinic Follow up, *Est. 08/20/20 3:00:00 CDT, in 3 months with MELI Campbell, Order for future visit, Bilateral hand numbness  Bilateral hand pain  Cardiomegaly  Ganglion cyst  Heel pain, bilateral  Hematuria  HLD (hyperlipidemia)  HTN (hypertension)  Hypokalemia...  CT Abdomen and Pelvis W W/O Contrast, Routine, *Est. 06/03/20 3:00:00 CDT, Other (please specify), Hematuria, unknown cause, None, Stretcher, Creatinine if needed per protocol, Rad Type, Oral Contrast, Order for future visit, Hematuria, Schedule this test, [facility],...  Office/Outpatient Visit Level 4 Established [id number] PC, Cardiomegaly  Ganglion cyst  Heel pain, bilateral  Hematuria  HLD (hyperlipidemia)  HTN (hypertension)  Hypokalemia  Left leg pain  Obesity  Positive FIT (fecal immunochemical test)  Prediabetes  Pulmonary nodule, right  Recurrent kidney s...  Pathology Non-Gyn Request [facility], *Est. 05/20/20 3:00:00 CDT, Routine, Order for future visit, AP Specimen, urine, hematuria, Nurse Collect, Print Label, RT - Routine, [id number], Hold Until Collected, Hematuria, *Est. 05/20/20 3:00:00 CDT  Urinalysis with Microscopic if Indicated, Routine collect, Urine, Order for future visit, *Est. 05/20/20 3:00:00 CDT, *Est. Stop date 05/20/20 3:00:00 CDT, Nurse collect, Hematuria  Urine Culture [id number], Routine collect, *Est. 05/20/20 3:00:00 CDT, Order for future visit, Urine, Nurse collect, *Est. Stop date 05/20/20 3:00:00 CDT, Hematuria  ?  HLD (hyperlipidemia)?E78.5  ????Low fat diet and exercise [7]  Ordered:  1160F- Medication reconciliation  completed during visit, Cardiomegaly  Ganglion cyst  Heel pain, bilateral  Hematuria  HLD (hyperlipidemia)  HTN (hypertension)  Hypokalemia  Left leg pain  Obesity  Positive FIT (fecal immunochemical test)  Prediabetes  Pulmonary nodule, right  Recurrent kidney s...  Clinic Follow up, *Est. 08/20/20 3:00:00 CDT, in 3 months with MELI Campbell, Order for future visit, Bilateral hand numbness  Bilateral hand pain  Cardiomegaly  Ganglion cyst  Heel pain, bilateral  Hematuria  HLD (hyperlipidemia)  HTN (hypertension)  Hypokalemia...  Lipid Panel, Routine collect, *Est. 08/20/20 3:00:00 CDT, Blood, Order for future visit, *Est. Stop date 08/20/20 3:00:00 CDT, Lab Collect, HLD (hyperlipidemia), 05/20/20 8:16:00 CDT  Office/Outpatient Visit Level 4 Established [id number] PC, Cardiomegaly  Ganglion cyst  Heel pain, bilateral  Hematuria  HLD (hyperlipidemia)  HTN (hypertension)  Hypokalemia  Left leg pain  Obesity  Positive FIT (fecal immunochemical test)  Prediabetes  Pulmonary nodule, right  Recurrent kidney s...  ?  HTN (hypertension)?I10  ?Low fat low salt diet and exercise. Cont Meds as prescribed.  Ordered:  1160F- Medication reconciliation completed during visit, Cardiomegaly  Ganglion cyst  Heel pain, bilateral  Hematuria  HLD (hyperlipidemia)  HTN (hypertension)  Hypokalemia  Left leg pain  Obesity  Positive FIT (fecal immunochemical test)  Prediabetes  Pulmonary nodule, right  Recurrent kidney s...  Basic Metabolic Panel, Routine collect, *Est. 08/20/20 3:00:00 CDT, Blood, Order for future visit, *Est. Stop date 08/20/20 3:00:00 CDT, Lab Collect, HTN (hypertension), 05/20/20 8:19:00 CDT  Clinic Follow up, *Est. 08/20/20 3:00:00 CDT, in 3 months with MELI Campbell, Order for future visit, Bilateral hand numbness  Bilateral hand pain  Cardiomegaly  Ganglion cyst  Heel pain, bilateral  Hematuria  HLD (hyperlipidemia)  HTN (hypertension)  Hypokalemia...  Office/Outpatient  Visit Level 4 Established [id number] PC, Cardiomegaly  Ganglion cyst  Heel pain, bilateral  Hematuria  HLD (hyperlipidemia)  HTN (hypertension)  Hypokalemia  Left leg pain  Obesity  Positive FIT (fecal immunochemical test)  Prediabetes  Pulmonary nodule, right  Recurrent kidney s...  ?  Hypokalemia?E87.6  ??Cont potassium med as prescribed. [8]  Ordered:  1160F- Medication reconciliation completed during visit, Cardiomegaly  Ganglion cyst  Heel pain, bilateral  Hematuria  HLD (hyperlipidemia)  HTN (hypertension)  Hypokalemia  Left leg pain  Obesity  Positive FIT (fecal immunochemical test)  Prediabetes  Pulmonary nodule, right  Recurrent kidney s...  Clinic Follow up, *Est. 08/20/20 3:00:00 CDT, in 3 months with MELI Campbell, Order for future visit, Bilateral hand numbness  Bilateral hand pain  Cardiomegaly  Ganglion cyst  Heel pain, bilateral  Hematuria  HLD (hyperlipidemia)  HTN (hypertension)  Hypokalemia...  Office/Outpatient Visit Level 4 Established [id number] PC, Cardiomegaly  Ganglion cyst  Heel pain, bilateral  Hematuria  HLD (hyperlipidemia)  HTN (hypertension)  Hypokalemia  Left leg pain  Obesity  Positive FIT (fecal immunochemical test)  Prediabetes  Pulmonary nodule, right  Recurrent kidney s...  ?  Left leg pain?M79.605  ???Encouraged OTC NSAIDs as prescribed per package prn pain. [9]  Ordered:  1160F- Medication reconciliation completed during visit, Cardiomegaly  Ganglion cyst  Heel pain, bilateral  Hematuria  HLD (hyperlipidemia)  HTN (hypertension)  Hypokalemia  Left leg pain  Obesity  Positive FIT (fecal immunochemical test)  Prediabetes  Pulmonary nodule, right  Recurrent kidney s...  Clinic Follow up, *Est. 08/20/20 3:00:00 CDT, in 3 months with MELI Campbell, Order for future visit, Bilateral hand numbness  Bilateral hand pain  Cardiomegaly  Ganglion cyst  Heel pain, bilateral  Hematuria  HLD (hyperlipidemia)  HTN (hypertension)   Hypokalemia...  Office/Outpatient Visit Level 4 Established [id number] PC, Cardiomegaly  Ganglion cyst  Heel pain, bilateral  Hematuria  HLD (hyperlipidemia)  HTN (hypertension)  Hypokalemia  Left leg pain  Obesity  Positive FIT (fecal immunochemical test)  Prediabetes  Pulmonary nodule, right  Recurrent kidney s...  ?  Obesity?E66.9  ??Low fat low salt diet and exercise. Cont med as prescribed. [10]  Ordered:  1160F- Medication reconciliation completed during visit, Cardiomegaly  Ganglion cyst  Heel pain, bilateral  Hematuria  HLD (hyperlipidemia)  HTN (hypertension)  Hypokalemia  Left leg pain  Obesity  Positive FIT (fecal immunochemical test)  Prediabetes  Pulmonary nodule, right  Recurrent kidney s...  Office/Outpatient Visit Level 4 Established 24149 PC, Cardiomegaly  Ganglion cyst  Heel pain, bilateral  Hematuria  HLD (hyperlipidemia)  HTN (hypertension)  Hypokalemia  Left leg pain  Obesity  Positive FIT (fecal immunochemical test)  Prediabetes  Pulmonary nodule, right  Recurrent kidney s...  ?  Positive FIT (fecal immunochemical test)?R19.5  ???Pt had colonoscopy done 10-21-19 with polypectomy X 5- no malignancy. Suggest repeat in 3 yrs (2022). [11]  Ordered:  1160F- Medication reconciliation completed during visit, Cardiomegaly  Ganglion cyst  Heel pain, bilateral  Hematuria  HLD (hyperlipidemia)  HTN (hypertension)  Hypokalemia  Left leg pain  Obesity  Positive FIT (fecal immunochemical test)  Prediabetes  Pulmonary nodule, right  Recurrent kidney s...  Office/Outpatient Visit Level 4 Established 61608 PC, Cardiomegaly  Ganglion cyst  Heel pain, bilateral  Hematuria  HLD (hyperlipidemia)  HTN (hypertension)  Hypokalemia  Left leg pain  Obesity  Positive FIT (fecal immunochemical test)  Prediabetes  Pulmonary nodule, right  Recurrent kidney s...  ?  Prediabetes?R73.03  ???A1c 5.5 down from 6.0. Pt had a POC A1c done 3-10-20?10.1. Will get A1c today.  ?Encouraged ADA diet and exercise.??  Ordered:  1160F- Medication reconciliation completed during visit, Cardiomegaly  Ganglion cyst  Heel pain, bilateral  Hematuria  HLD (hyperlipidemia)  HTN (hypertension)  Hypokalemia  Left leg pain  Obesity  Positive FIT (fecal immunochemical test)  Prediabetes  Pulmonary nodule, right  Recurrent kidney s...  Hemoglobin A1C [facility], Routine collect, *Est. 05/20/20 3:00:00 CDT, Blood, Order for future visit, *Est. Stop date 05/20/20 3:00:00 CDT, Lab Collect, Prediabetes, 05/20/20 8:16:00 CDT  Office/Outpatient Visit Level 4 Established [id number] PC, Cardiomegaly  Ganglion cyst  Heel pain, bilateral  Hematuria  HLD (hyperlipidemia)  HTN (hypertension)  Hypokalemia  Left leg pain  Obesity  Positive FIT (fecal immunochemical test)  Prediabetes  Pulmonary nodule, right  Recurrent kidney s...  ?  Pulmonary nodule, right?R91.1  ??CT thorax showed stable and?likely represents sequela of prior  infectious/inflammatory process. Pt denies hx of tobacco use. Pt had Prior CT Thorax done 1-25-16 and 7-3-17. 3rd CT done 5-21-19 showing:  FINDINGS: Several subpleural millimetric lung nodules are stable. No  new worrisome pulmonary nodule. No pleural effusion. There is no  change in axillary or mediastinal lymph nodes. There is cardiomegaly  with small pericardial effusion. Fatty liver or steatosis again  incidentally noted. The lungs are well aerated.  ?  IMPRESSION: Stable millimetric subpleural pulmonary nodules dating  back to 2016 are likely benign  No further f/u needed.?? [12]  Ordered:  1160F- Medication reconciliation completed during visit, Cardiomegaly  Ganglion cyst  Heel pain, bilateral  Hematuria  HLD (hyperlipidemia)  HTN (hypertension)  Hypokalemia  Left leg pain  Obesity  Positive FIT (fecal immunochemical test)  Prediabetes  Pulmonary nodule, right  Recurrent kidney s...  Office/Outpatient Visit Level 4 Established [id number] PC, Cardiomegaly  Ganglion  cyst  Heel pain, bilateral  Hematuria  HLD (hyperlipidemia)  HTN (hypertension)  Hypokalemia  Left leg pain  Obesity  Positive FIT (fecal immunochemical test)  Prediabetes  Pulmonary nodule, right  Recurrent kidney s...  ?  Recurrent kidney stones?N20.0  ???Encouraged to drink plenty of?water [13]  Ordered:  1160F- Medication reconciliation completed during visit, Cardiomegaly  Ganglion cyst  Heel pain, bilateral  Hematuria  HLD (hyperlipidemia)  HTN (hypertension)  Hypokalemia  Left leg pain  Obesity  Positive FIT (fecal immunochemical test)  Prediabetes  Pulmonary nodule, right  Recurrent kidney s...  Office/Outpatient Visit Level 4 Established 05703 PC, Cardiomegaly  Ganglion cyst  Heel pain, bilateral  Hematuria  HLD (hyperlipidemia)  HTN (hypertension)  Hypokalemia  Left leg pain  Obesity  Positive FIT (fecal immunochemical test)  Prediabetes  Pulmonary nodule, right  Recurrent kidney s...  ?  Vitamin D deficiency?E55.9  ??Cont Vit D supplement as prescribed. [14]  Ordered:  1160F- Medication reconciliation completed during visit, Cardiomegaly  Ganglion cyst  Heel pain, bilateral  Hematuria  HLD (hyperlipidemia)  HTN (hypertension)  Hypokalemia  Left leg pain  Obesity  Positive FIT (fecal immunochemical test)  Prediabetes  Pulmonary nodule, right  Recurrent kidney s...  Office/Outpatient Visit Level 4 Established 51177 PC, Cardiomegaly  Ganglion cyst  Heel pain, bilateral  Hematuria  HLD (hyperlipidemia)  HTN (hypertension)  Hypokalemia  Left leg pain  Obesity  Positive FIT (fecal immunochemical test)  Prediabetes  Pulmonary nodule, right  Recurrent kidney s...  ?  Well adult exam?Z00.00  ?? Lab?today and in 3?months. Keep GYN appt as scheduled. Pt UTD with MMG.? Pt had colonoscopy done 10-21-19 with polypectomy X 5- no malignancy. Suggest repeat in 3 yrs (2022).  Ordered:  1160F- Medication reconciliation completed during visit, Cardiomegaly  Ganglion  cyst  Heel pain, bilateral  Hematuria  HLD (hyperlipidemia)  HTN (hypertension)  Hypokalemia  Left leg pain  Obesity  Positive FIT (fecal immunochemical test)  Prediabetes  Pulmonary nodule, right  Recurrent kidney s...  Office/Outpatient Visit Level 4 Established [id number] PC, Cardiomegaly  Ganglion cyst  Heel pain, bilateral  Hematuria  HLD (hyperlipidemia)  HTN (hypertension)  Hypokalemia  Left leg pain  Obesity  Positive FIT (fecal immunochemical test)  Prediabetes  Pulmonary nodule, right  Recurrent kidney s...  ?  RTC in?3 months with labs 1 week prior to appt. [15]  Referrals  [facility] Internal Referral to Urology Clinic, Specialty: Urology, Reason: hematuria, hx of kidney stones in past., Refer To: Provider Not Specified, [facility], [address] [place] [address]., Start: 05/20/20 8:23:00 CDT  Clinic Follow up, *Est. 08/20/20 3:00:00 CDT, in 3 months with MELI Campbell, Order for future visit, Bilateral hand numbness  Bilateral hand pain  Cardiomegaly  Ganglion cyst  Heel pain, bilateral  Hematuria  HLD (hyperlipidemia)  HTN (hypertension)  Hypokalemia...   Problem List/Past Medical History  Ongoing  CHF - Congestive heart failure  Obesity  Strain of thoracic region  Historical  High Blood Pressure  Pregnant  Pregnant  Pregnant  Pregnant  Procedure/Surgical History  Colon Tattoo (10/21/2019)  Colonoscopy (None) (10/21/2019)  Colonoscopy, flexible; with biopsy, single or multiple (10/21/2019)  Colonoscopy, flexible; with directed submucosal injection(s), any substance (10/21/2019)  Colonoscopy, flexible; with removal of tumor(s), polyp(s), or other lesion(s) by snare technique (10/21/2019)  Excision of Cecum, Via Natural or Artificial Opening Endoscopic, Diagnostic (10/21/2019)  Excision of Descending Colon, Via Natural or Artificial Opening Endoscopic, Diagnostic (10/21/2019)  Excision of Sigmoid Colon, Via Natural or Artificial Opening Endoscopic, Diagnostic  (10/21/2019)  Excision of Transverse Colon, Via Natural or Artificial Opening Endoscopic, Diagnostic (10/21/2019)  Excision of Transverse Colon, Via Natural or Artificial Opening Endoscopic, Diagnostic (10/21/2019)  Introduction of Other Therapeutic Substance into Lower GI, Via Natural or Artificial Opening Endoscopic (10/21/2019)  Polypectomy (10/21/2019)   section  laser kidney stones   Medications  amlodipine 10 mg oral tablet, 10 mg= 1 tab(s), Oral, Daily, 3 refills  aspirin 81 mg oral tablet, 81 mg= 1 tab(s), Oral, Daily  Bactrim  mg-160 mg oral tablet, 1 tab(s), Oral, BID  hydrochlorothiazide 25 mg oral tablet, See Instructions, 3 refills  K-Dur 20 oral tablet, extended release, 20 mEq= 1 tab(s), Oral, Daily, 3 refills  lisinopril 10 mg oral tablet, 10 mg= 1 tab(s), Oral, Daily, 3 refills  Mobic 7.5 mg oral tablet, 7.5 mg= 1 tab(s), Oral, BID, PRN, 6 refills,? ?Not taking  tiZANidine 2 mg oral capsule, 2 mg= 1 cap(s), Oral, q8hr, PRN, 3 refills  Vitamin D3 2000 intl units oral capsule, 2000 IntUnit= 1 cap(s), Oral, Daily  Allergies  No Known Allergies  Social History  Abuse/Neglect  No, 2020  Alcohol - Low Risk, 2015  1-2 times per month, 2015  Employment/School  Employed, Activity level: Occasional physical work. Highest education level: High school., 2015  Exercise  Exercise type: does not excercise., 2015  Home/Environment  Lives with Alone. Alcohol abuse in household: No. Substance abuse in household: No. Smoker in household: No. Injuries/Abuse/Neglect in household: No. Feels unsafe at home: No. Safe place to go: Yes. Family/Friends available for support: No. Concern for family members at home: No., 2015  Nutrition/Health  Regular, 2015  Other  Sexual  Sexually active: No., 2019  Spiritual/Cultural  Mu-ism, 12/10/2019  Substance Use - Denies Substance Abuse, 2012  Never, 2016  Tobacco - Denies Tobacco Use, 2012  Never (less  than 100 in lifetime), N/A, 01/25/2020  Never (less than 100 in lifetime), N/A, 12/10/2019  Family History  Diabetes mellitus type 2: Mother.  Hypertension.: Mother.  Immunizations  Vaccine Date Status   zoster vaccine, inactivated 01/25/2020 Given   influenza virus vaccine, inactivated 01/25/2020 Given   influenza virus vaccine, inactivated 11/30/2018 Given   influenza virus vaccine, inactivated 02/07/2018 Given   tetanus/diphtheria/pertussis, acel(Tdap) 06/23/2017 Given   influenza virus vaccine, inactivated 10/23/2015 Given   Health Maintenance  Health Maintenance  ???Pending?(in the next year)  ??? ??OverDue  ??? ? ? ?Diabetes Screening due??and every?  ??? ??Due?  ??? ? ? ?Diabetes Maintenance-Eye Exam due??05/20/20??and every?  ??? ? ? ?Diabetes Maintenance-Foot Exam due??05/20/20??and every?  ??? ? ? ?HF-Ejection Fraction Past 13 Months if Hospitalized due??05/20/20??and every 1??year(s)  ??? ? ? ?HF-Fluid Restriction/Low Sodium Diet Education due??05/20/20??and every 1??year(s)  ??? ? ? ?HF-LVEF due??05/20/20??and every 2??year(s)  ??? ??Due In Future?  ??? ? ? ?Aspirin Therapy for CVD Prevention not due until??12/10/20??and every 1??year(s)  ??? ? ? ?Alcohol Misuse Screening not due until??01/01/21??and every 1??year(s)  ??? ? ? ?Functional Assessment not due until??01/01/21??and every 1??year(s)  ??? ? ? ?Obesity Screening not due until??01/01/21??and every 1??year(s)  ??? ? ? ?Blood Pressure Screening not due until??03/10/21??and every 1??year(s)  ??? ? ? ?Body Mass Index Check not due until??03/10/21??and every 1??year(s)  ??? ? ? ?Depression Screening not due until??03/10/21??and every 1??year(s)  ??? ? ? ?Diabetes Maintenance-HgbA1c not due until??03/10/21??and every 1??year(s)  ??? ? ? ?HF-Heart Failure Education not due until??03/10/21??and every 1??year(s)  ??? ? ? ?Hypertension Management-Blood Pressure not due until??03/10/21??and every 1??year(s)  ??? ? ? ?ADL Screening not due  until??03/10/21??and every 1??year(s)  ??? ? ? ?HF-ACEI/ARB Prescribed if Clinically Indicated not due until??03/10/21??and every 1??year(s)  ??? ? ? ?Diabetes Maintenance-Fasting Lipid Profile not due until??05/19/21??and every 1??year(s)  ??? ? ? ?Hypertension Management-BMP not due until??05/19/21??and every 1??year(s)  ???Satisfied?(in the past 1 year)  ??? ??Satisfied?  ??? ? ? ?ADL Screening on??03/10/20.??Satisfied by Mikki Farris LPN  ??? ? ? ?Alcohol Misuse Screening on??03/10/20.??Satisfied by Dea Campbell NP  ??? ? ? ?Aspirin Therapy for CVD Prevention on??12/10/19.??Satisfied by Dea Campbell NP??Reason: Expectation Satisfied Elsewhere  ??? ? ? ?Blood Pressure Screening on??03/10/20.??Satisfied by Mikki Farris LPN  ??? ? ? ?Body Mass Index Check on??03/10/20.??Satisfied by Mikki Farris LPN  ??? ? ? ?Breast Cancer Screening on??11/21/19.??Satisfied by Geovanna Syed  ??? ? ? ?Depression Screening on??03/10/20.??Satisfied by Mikki Farris LPN  ??? ? ? ?Diabetes Maintenance-Fasting Lipid Profile on??05/19/20.??Satisfied by Chapincito Padilla Jr.  ??? ? ? ?Diabetes Screening on??05/19/20.??Satisfied by Chapincito Padilla Jr.  ??? ? ? ?Functional Assessment on??03/10/20.??Satisfied by Mikki Farris LPN  ??? ? ? ?HF-ACEI/ARB Prescribed if Clinically Indicated on??03/10/20.??Satisfied by Dea Campbell NP  ??? ? ? ?Hypertension Management-BMP on??05/19/20.??Satisfied by Chapincito Padilla Jr.  ??? ? ? ?Influenza Vaccine on??01/25/20.??Satisfied by Roula Joaquin  ??? ? ? ?Lipid Screening on??05/19/20.??Satisfied by Chapincito Padilla Jr.  ??? ? ? ?Obesity Screening on??03/10/20.??Satisfied by Mikki Farris LPN  ??? ? ? ?Zoster Vaccine on??01/25/20.??Satisfied by Roula Joaquin  ??? ??Refused?  ??? ? ? ?Zoster Vaccine on??12/10/19.??Recorded by Dea Campbell NP??Reason: Patient Refuses  ?     [1]?Office Visit Note; Dea Campbell NP  03/10/2020 07:51 CDT  [2]?Office Visit Note; Shannan NP, Dea M 03/10/2020 07:51 CDT  [3]?Office Visit Note; Shannan NP, Dea M 03/10/2020 07:51 CDT  [4]?Office Visit Note; Shannan NP, Dea M 03/10/2020 07:51 CDT  [5]?Office Visit Note; Shannan NP, Dea M 03/10/2020 07:51 CDT  [6]?XR bilat heels; Shannan NP, Dea M 04/20/2020 21:12 CDT  [7]?Office Visit Note; Shannan NP, Dea M 03/10/2020 07:51 CDT  [8]?Office Visit Note; Shannan NP, Dea M 03/10/2020 07:51 CDT  [9]?Office Visit Note; Shannan NP, Dea M 03/10/2020 07:51 CDT  [10]?Office Visit Note; Shannan NP, Dea M 03/10/2020 07:51 CDT  [11]?Office Visit Note; Shannan NP, Dea M 03/10/2020 07:51 CDT  [12]?Office Visit Note; Shannan NP, Dea M 03/10/2020 07:51 CDT  [13]?Office Visit Note; Shannan NP, Dea M 03/10/2020 07:51 CDT  [14]?Office Visit Note; Shannan NP, Dea M 03/10/2020 07:51 CDT  [15]?Office Visit Note; Shannan NP, Dea M 03/10/2020 07:51 CDT

## 2022-05-23 PROBLEM — R31.9 HEMATURIA: Status: ACTIVE | Noted: 2022-05-23

## 2022-05-23 PROBLEM — R73.03 PREDIABETES: Status: ACTIVE | Noted: 2022-05-23

## 2022-05-23 PROBLEM — E87.6 HYPOKALEMIA: Status: ACTIVE | Noted: 2022-05-23

## 2022-05-23 PROBLEM — M79.605 LEFT LEG PAIN: Status: ACTIVE | Noted: 2022-05-23

## 2022-05-23 PROBLEM — M25.511 RIGHT SHOULDER PAIN: Status: ACTIVE | Noted: 2022-05-23

## 2022-05-23 PROBLEM — R20.0 BILATERAL HAND NUMBNESS: Status: ACTIVE | Noted: 2022-05-23

## 2022-05-23 PROBLEM — R19.5 POSITIVE FIT (FECAL IMMUNOCHEMICAL TEST): Status: ACTIVE | Noted: 2022-05-23

## 2022-05-23 PROBLEM — R07.9 CHEST PAIN: Status: ACTIVE | Noted: 2022-05-23

## 2022-05-23 PROBLEM — M67.40 GANGLION CYST: Status: ACTIVE | Noted: 2022-05-23

## 2022-05-23 PROBLEM — M79.642 BILATERAL HAND PAIN: Status: ACTIVE | Noted: 2022-05-23

## 2022-05-23 PROBLEM — M79.641 BILATERAL HAND PAIN: Status: ACTIVE | Noted: 2022-05-23

## 2022-05-23 PROBLEM — I10 HTN (HYPERTENSION): Status: ACTIVE | Noted: 2022-05-23

## 2022-05-23 PROBLEM — M79.671 HEEL PAIN, BILATERAL: Status: ACTIVE | Noted: 2022-05-23

## 2022-05-23 PROBLEM — I51.7 CARDIOMEGALY: Status: ACTIVE | Noted: 2022-05-23

## 2022-05-23 PROBLEM — E66.9 OBESITY: Status: ACTIVE | Noted: 2022-05-23

## 2022-05-23 PROBLEM — E78.5 HLD (HYPERLIPIDEMIA): Status: ACTIVE | Noted: 2022-05-23

## 2022-05-23 PROBLEM — R91.1 PULMONARY NODULE: Status: ACTIVE | Noted: 2022-05-23

## 2022-05-23 PROBLEM — M79.672 HEEL PAIN, BILATERAL: Status: ACTIVE | Noted: 2022-05-23

## 2022-05-23 PROBLEM — Z00.00 WELL ADULT EXAM: Status: ACTIVE | Noted: 2022-05-23

## 2022-05-23 PROBLEM — E55.9 VITAMIN D DEFICIENCY: Status: ACTIVE | Noted: 2022-05-23

## 2022-05-23 PROBLEM — N20.0 RECURRENT KIDNEY STONES: Status: ACTIVE | Noted: 2022-05-23

## 2022-05-24 ENCOUNTER — OFFICE VISIT (OUTPATIENT)
Dept: INTERNAL MEDICINE | Facility: CLINIC | Age: 62
End: 2022-05-24

## 2022-05-24 ENCOUNTER — LAB VISIT (OUTPATIENT)
Dept: LAB | Facility: HOSPITAL | Age: 62
End: 2022-05-24
Attending: NURSE PRACTITIONER

## 2022-05-24 VITALS
HEART RATE: 56 BPM | RESPIRATION RATE: 18 BRPM | DIASTOLIC BLOOD PRESSURE: 79 MMHG | WEIGHT: 208 LBS | HEIGHT: 63 IN | SYSTOLIC BLOOD PRESSURE: 123 MMHG | BODY MASS INDEX: 36.86 KG/M2 | TEMPERATURE: 98 F

## 2022-05-24 DIAGNOSIS — G89.29 CHRONIC RIGHT SHOULDER PAIN: ICD-10-CM

## 2022-05-24 DIAGNOSIS — R20.0 BILATERAL HAND NUMBNESS: ICD-10-CM

## 2022-05-24 DIAGNOSIS — R73.03 PREDIABETES: ICD-10-CM

## 2022-05-24 DIAGNOSIS — Z00.00 WELL ADULT EXAM: ICD-10-CM

## 2022-05-24 DIAGNOSIS — E78.5 HYPERLIPIDEMIA, UNSPECIFIED HYPERLIPIDEMIA TYPE: Primary | ICD-10-CM

## 2022-05-24 DIAGNOSIS — M79.642 BILATERAL HAND PAIN: ICD-10-CM

## 2022-05-24 DIAGNOSIS — M79.605 LEFT LEG PAIN: ICD-10-CM

## 2022-05-24 DIAGNOSIS — E55.9 VITAMIN D DEFICIENCY: ICD-10-CM

## 2022-05-24 DIAGNOSIS — R31.9 HEMATURIA, UNSPECIFIED TYPE: ICD-10-CM

## 2022-05-24 DIAGNOSIS — E87.6 HYPOKALEMIA: ICD-10-CM

## 2022-05-24 DIAGNOSIS — E66.9 OBESITY, UNSPECIFIED CLASSIFICATION, UNSPECIFIED OBESITY TYPE, UNSPECIFIED WHETHER SERIOUS COMORBIDITY PRESENT: ICD-10-CM

## 2022-05-24 DIAGNOSIS — I51.7 CARDIOMEGALY: ICD-10-CM

## 2022-05-24 DIAGNOSIS — M79.641 BILATERAL HAND PAIN: ICD-10-CM

## 2022-05-24 DIAGNOSIS — R19.5 POSITIVE FIT (FECAL IMMUNOCHEMICAL TEST): ICD-10-CM

## 2022-05-24 DIAGNOSIS — E78.5 HYPERLIPIDEMIA, UNSPECIFIED HYPERLIPIDEMIA TYPE: ICD-10-CM

## 2022-05-24 DIAGNOSIS — R91.1 PULMONARY NODULE: ICD-10-CM

## 2022-05-24 DIAGNOSIS — I10 HYPERTENSION, UNSPECIFIED TYPE: ICD-10-CM

## 2022-05-24 DIAGNOSIS — M25.511 CHRONIC RIGHT SHOULDER PAIN: ICD-10-CM

## 2022-05-24 LAB
ALBUMIN SERPL-MCNC: 3.5 GM/DL (ref 3.4–4.8)
ALBUMIN/GLOB SERPL: 0.9 RATIO (ref 1.1–2)
ALP SERPL-CCNC: 47 UNIT/L (ref 40–150)
ALT SERPL-CCNC: 12 UNIT/L (ref 0–55)
AST SERPL-CCNC: 16 UNIT/L (ref 5–34)
BILIRUBIN DIRECT+TOT PNL SERPL-MCNC: 0.4 MG/DL
BUN SERPL-MCNC: 17.4 MG/DL (ref 9.8–20.1)
CALCIUM SERPL-MCNC: 9.4 MG/DL (ref 8.4–10.2)
CHLORIDE SERPL-SCNC: 108 MMOL/L (ref 98–107)
CHOLEST SERPL-MCNC: 179 MG/DL
CHOLEST/HDLC SERPL: 5 {RATIO} (ref 0–5)
CO2 SERPL-SCNC: 26 MMOL/L (ref 23–31)
CREAT SERPL-MCNC: 0.73 MG/DL (ref 0.55–1.02)
CRP SERPL-MCNC: 5.4 MG/L
ERYTHROCYTE [SEDIMENTATION RATE] IN BLOOD: 16 MM/HR (ref 0–20)
GLOBULIN SER-MCNC: 3.9 GM/DL (ref 2.4–3.5)
GLUCOSE SERPL-MCNC: 106 MG/DL (ref 82–115)
HDLC SERPL-MCNC: 35 MG/DL (ref 35–60)
LDLC SERPL CALC-MCNC: 105 MG/DL (ref 50–140)
POTASSIUM SERPL-SCNC: 3.3 MMOL/L (ref 3.5–5.1)
PROT SERPL-MCNC: 7.4 GM/DL (ref 5.8–7.6)
SODIUM SERPL-SCNC: 143 MMOL/L (ref 136–145)
TRIGL SERPL-MCNC: 196 MG/DL (ref 37–140)
VLDLC SERPL CALC-MCNC: 39 MG/DL

## 2022-05-24 PROCEDURE — 86140 C-REACTIVE PROTEIN: CPT

## 2022-05-24 PROCEDURE — 99214 OFFICE O/P EST MOD 30 MIN: CPT | Mod: S$PBB,,, | Performed by: NURSE PRACTITIONER

## 2022-05-24 PROCEDURE — 99214 OFFICE O/P EST MOD 30 MIN: CPT | Mod: PBBFAC | Performed by: NURSE PRACTITIONER

## 2022-05-24 PROCEDURE — 99214 PR OFFICE/OUTPT VISIT, EST, LEVL IV, 30-39 MIN: ICD-10-PCS | Mod: S$PBB,,, | Performed by: NURSE PRACTITIONER

## 2022-05-24 PROCEDURE — 85651 RBC SED RATE NONAUTOMATED: CPT

## 2022-05-24 PROCEDURE — 36415 COLL VENOUS BLD VENIPUNCTURE: CPT

## 2022-05-24 PROCEDURE — 86038 ANTINUCLEAR ANTIBODIES: CPT

## 2022-05-24 PROCEDURE — 80053 COMPREHEN METABOLIC PANEL: CPT

## 2022-05-24 PROCEDURE — 86039 ANTINUCLEAR ANTIBODIES (ANA): CPT

## 2022-05-24 PROCEDURE — 86255 FLUORESCENT ANTIBODY SCREEN: CPT

## 2022-05-24 PROCEDURE — 83516 IMMUNOASSAY NONANTIBODY: CPT

## 2022-05-24 PROCEDURE — 80061 LIPID PANEL: CPT

## 2022-05-24 RX ORDER — AMLODIPINE BESYLATE 10 MG/1
10 TABLET ORAL
COMMUNITY
Start: 2021-08-24 | End: 2022-07-18 | Stop reason: SDUPTHER

## 2022-05-24 RX ORDER — MELOXICAM 7.5 MG/1
7.5 TABLET ORAL
COMMUNITY
Start: 2021-11-23 | End: 2022-07-18 | Stop reason: SDUPTHER

## 2022-05-24 RX ORDER — TIZANIDINE 4 MG/1
4 TABLET ORAL EVERY 6 HOURS PRN
Qty: 90 TABLET | Refills: 1 | Status: SHIPPED | OUTPATIENT
Start: 2022-05-24 | End: 2022-07-18 | Stop reason: SDUPTHER

## 2022-05-24 RX ORDER — TIZANIDINE HYDROCHLORIDE 2 MG/1
2 CAPSULE, GELATIN COATED ORAL
COMMUNITY
Start: 2021-11-23 | End: 2022-05-24

## 2022-05-24 RX ORDER — HYDROCHLOROTHIAZIDE 25 MG/1
TABLET ORAL
COMMUNITY
Start: 2021-08-24 | End: 2022-07-18 | Stop reason: SDUPTHER

## 2022-05-24 RX ORDER — POTASSIUM CHLORIDE 20 MEQ/1
20 TABLET, EXTENDED RELEASE ORAL
COMMUNITY
Start: 2021-08-24 | End: 2022-07-18 | Stop reason: SDUPTHER

## 2022-05-24 RX ORDER — ATORVASTATIN CALCIUM 10 MG/1
10 TABLET, FILM COATED ORAL
COMMUNITY
Start: 2021-11-23 | End: 2022-07-18 | Stop reason: SDUPTHER

## 2022-05-24 RX ORDER — TRAZODONE HYDROCHLORIDE 50 MG/1
25 TABLET ORAL
COMMUNITY
Start: 2021-11-23 | End: 2022-05-24

## 2022-05-24 NOTE — PROGRESS NOTES
[facility]  [facility]     Patient Name: Laina Macias   : 1960  MRN:[id number]     Review of patient's allergies indicates:  No Known Allergies        CC:  Chief Complaint   Patient presents with    Follow-up        HPI  60 y/o female here for f/u. Pt has hx Cardiomegaly, Hematuria, HTN, Right pulm nodule, Recurrent kidney stones, SOB. Pt was followed in Cardio Cl in past- denies swelling, SOB, cough.  Pt has hx of kidney stones in past.  Pt followed in uro cl.          ROS  Review of Systems   Constitutional: Negative.    HENT: Negative.    Eyes: Negative.    Respiratory: Negative.    Cardiovascular: Negative.    Gastrointestinal: Negative.    Endocrine: Negative.    Genitourinary: Negative.    Musculoskeletal: Negative.    Integumentary:  Negative.   Allergic/Immunologic: Negative.    Neurological: Negative.    Hematological: Negative.    Psychiatric/Behavioral: Negative.         Physical Examination:  Vitals:    22 0809   BP: 123/79   Pulse: (!) 56   Resp: 18   Temp: 98.1 °F (36.7 °C)          Physical Exam  Vitals reviewed.   Constitutional:       Appearance: Normal appearance. She is normal weight.   HENT:      Head: Normocephalic.   Cardiovascular:      Rate and Rhythm: Normal rate and regular rhythm.      Pulses: Normal pulses.      Heart sounds: Normal heart sounds.   Pulmonary:      Effort: Pulmonary effort is normal.      Breath sounds: Normal breath sounds.   Abdominal:      General: Abdomen is flat.      Palpations: Abdomen is soft.   Musculoskeletal:         General: Normal range of motion.      Cervical back: Normal range of motion.   Skin:     General: Skin is warm and dry.   Neurological:      Mental Status: She is alert.   Psychiatric:         Mood and Affect: Mood normal.            Labs/Imaging:  Reviewed    Assessment/Plan:  1. Bilateral hand numbness  Pt followed in Ortho- [facility] 10-27-21. Pt needs to call and RS appt.    2. Bilateral hand pain  Cont  pain med as prescribed. Pt had XR right hand done 11-23-21 showing:  Reason For Exam  pain    Radiology Report    INDICATION: Pain    COMPARISON: None    FINDINGS:    PA, oblique and lateral views of the right hand are obtained. There is  no fracture. Alignment is anatomic. There are moderate to severe  degenerative changes of the second through fifth interphalangeal  joints with moderately severe degenerative changes at the first CMC  articulation. Degenerative changes manifest as nonuniform joint space  narrowing and small marginal osteophyte formation. No periarticular  erosions.    IMPRESSION:::    1. Moderate to severe degenerative changes at the second through  fifth interphalangeal joints and first CMC.    Signature Line  Electronically Signed By: Joaquin Hermosillo MD  Date/Time Signed: 11/23/2021 11:11      This document has an image    Result type: XR Hand Right Minimum 3 Views  Result date: November 23, 2021 10:08 CST  Result status: Auth (Verified)  Result title: XR Hand Right Minimum 3 Views  Performed by: Joaquin Hermosillo MD on November 23, 2021 11:09 CST  Verified by: Joaquin Hermosillo MD on November 23, 2021 11:11 CST  Encounter info: [id number], [facility], Clinic Visit, 11/23/2021 - 11/23/2021    Cont Meloxicam as prescribed prn pain.  Auto immune labs just done prior to appt- results pending. Pt denies family history of auto immune diseases.    3. Cardiomegaly  Pt followed in Cardio Cl. Keep appts     4. Hematuria, unspecified type  Pt followed in Uro cl. Keep appts     5. Hyperlipidemia, unspecified hyperlipidemia type  Low fat diet and exercise. Cont med as prescribed.Labs just done prior to arrival for appt. Will RTC in 1 month for lab results.    6. Hypertension, unspecified type  Low fat low salt diet and exercise. Cont Meds as prescribed.     7. Hypokalemia  Labs done prior to arrival for appt. Results pending.  8. Left leg pain  Encouraged OTC NSAIDs as prescribed per package prn  pain.    9. Obesity, unspecified classification, unspecified obesity type, unspecified whether serious comorbidity present  BMI is considered obese. Encouraged low fat diet and exercise. Education provided.    10. Positive FIT (fecal immunochemical test)  Pt had colonoscopy done 10-21-19 with polypectomy X 5- no malignancy. Suggest repeat in 3 yrs (10/2022).     11. Prediabetes  Encouraged ADA diet and exercise.    12. Pulmonary nodule  CT thorax showed stable and likely represents sequela of prior  infectious/inflammatory process. Pt denies hx of tobacco use. Pt had Prior CT Thorax done 1-25-16 and 7-3-17. 3rd CT done 5-21-19 showing:  FINDINGS: Several subpleural millimetric lung nodules are stable. No  new worrisome pulmonary nodule. No pleural effusion. There is no  change in axillary or mediastinal lymph nodes. There is cardiomegaly  with small pericardial effusion. Fatty liver or steatosis again  incidentally noted. The lungs are well aerated.    IMPRESSION: Stable millimetric subpleural pulmonary nodules dating  back to 2016 are likely benign  No further f/u needed    13. Chronic right shoulder pain  XR right shoulder 4-29-21 showing:  Reason For Exam    Radiology Report  EXAM: XR Shoulder Right Minimum 2 Views  DATE: 4/29/2021 10:03 AM CDT  INDICATION: Pain    COMPARISON: Right shoulder radiographs 2/22/2021.    TECHNIQUE: AP and scapular Y views of the right shoulder.      FINDINGS:   No acute fracture or malalignment is identified of the right shoulder.  Stable mild degenerative changes of the right acromioclavicular joint.  The right glenohumeral and acromioclavicular joints are otherwise  congruent on the provided views. Alignment is maintained.  Redemonstrated punctate calcific focus adjacent to the rotator  footplate of the right humerus, which may reflect rotator cuff  tendinopathy. There is no aggressive-appearing bone lesion or abnormal  periosteal reaction. No soft tissue gas. Bone mineralization  is  maintained. The visualized right lung apex is clear.      IMPRESSION:  No acute osseous abnormality of the right shoulder.      Signature Line  Electronically Signed By: Jose Farias MD  Date/Time Signed: 04/29/2021 15:49      This document has an image    Result type: XR Shoulder Right Minimum 2 Views  Result date: April 29, 2021 10:03 CDT  Result status: Auth (Verified)  Result title: XR Shoulder Right Minimum 2 Views  Performed by: Jose Farias MD on April 29, 2021 15:47 CDT  Verified by: Jose Farias MD on April 29, 2021 15:49 CDT  Encounter info: [id number], [facility], Clinic Visit, 4/29/2021 - 4/29/2021  Denies injury or heavy lifting. Cont Meloxicam as prescribed prn pain. Keep Ortho cl appts.    14. Vitamin D deficiency  Cont Vit D supplement as prescribed.     15. Well adult exam   Labs done prior to arrival for appt today- results pending. RTC in 1 month for lab results. Keep GYN cl appt as scheduled. MMG UTD. Pt UTD with colonoscopy. Pt had colonoscopy done 10-21-19 with polypectomy X 5- no malignancy. Suggest repeat in 3 yrs (10/2022).        RTC in 1 month for lab results.

## 2022-05-26 LAB
ANCA AB SER QL IF: NEGATIVE
AR ANA INTERPRETIVE COMMENT: ABNORMAL
AR ANA TITER: ABNORMAL
AR ANTINUCLEAR ANTIBODY (ANA), HEP-2, IGG: DETECTED
P-ANCA SER QL IF: NEGATIVE

## 2022-05-27 LAB
RF IGA SER-ACNC: <5 UNITS
RF IGG SER-ACNC: 11 UNITS
RF IGM SER-ACNC: 16 UNITS

## 2022-06-21 ENCOUNTER — HOSPITAL ENCOUNTER (EMERGENCY)
Facility: HOSPITAL | Age: 62
Discharge: HOME | End: 2022-06-22
Attending: STUDENT IN AN ORGANIZED HEALTH CARE EDUCATION/TRAINING PROGRAM

## 2022-06-21 DIAGNOSIS — G43.909 MIGRAINE WITHOUT STATUS MIGRAINOSUS, NOT INTRACTABLE, UNSPECIFIED MIGRAINE TYPE: Primary | ICD-10-CM

## 2022-06-21 PROCEDURE — 99284 EMERGENCY DEPT VISIT MOD MDM: CPT | Mod: 25

## 2022-06-21 RX ORDER — METOCLOPRAMIDE HYDROCHLORIDE 5 MG/ML
10 INJECTION INTRAMUSCULAR; INTRAVENOUS
Status: COMPLETED | OUTPATIENT
Start: 2022-06-22 | End: 2022-06-22

## 2022-06-21 RX ORDER — DIPHENHYDRAMINE HYDROCHLORIDE 50 MG/ML
12.5 INJECTION INTRAMUSCULAR; INTRAVENOUS
Status: COMPLETED | OUTPATIENT
Start: 2022-06-22 | End: 2022-06-22

## 2022-06-21 RX ORDER — KETOROLAC TROMETHAMINE 30 MG/ML
30 INJECTION, SOLUTION INTRAMUSCULAR; INTRAVENOUS
Status: COMPLETED | OUTPATIENT
Start: 2022-06-22 | End: 2022-06-22

## 2022-06-22 VITALS
BODY MASS INDEX: 39.08 KG/M2 | HEART RATE: 60 BPM | WEIGHT: 207 LBS | DIASTOLIC BLOOD PRESSURE: 86 MMHG | OXYGEN SATURATION: 100 % | RESPIRATION RATE: 20 BRPM | SYSTOLIC BLOOD PRESSURE: 164 MMHG | TEMPERATURE: 97 F | HEIGHT: 61 IN

## 2022-06-22 PROCEDURE — 63600175 PHARM REV CODE 636 W HCPCS: Performed by: PHYSICIAN ASSISTANT

## 2022-06-22 PROCEDURE — 96372 THER/PROPH/DIAG INJ SC/IM: CPT | Performed by: PHYSICIAN ASSISTANT

## 2022-06-22 RX ORDER — BUTALBITAL, ACETAMINOPHEN AND CAFFEINE 50; 325; 40 MG/1; MG/1; MG/1
1 TABLET ORAL EVERY 6 HOURS PRN
Qty: 9 TABLET | Refills: 0 | Status: SHIPPED | OUTPATIENT
Start: 2022-06-22 | End: 2022-06-25

## 2022-06-22 RX ADMIN — KETOROLAC TROMETHAMINE 30 MG: 30 INJECTION, SOLUTION INTRAMUSCULAR at 12:06

## 2022-06-22 RX ADMIN — METOCLOPRAMIDE 10 MG: 5 INJECTION, SOLUTION INTRAMUSCULAR; INTRAVENOUS at 12:06

## 2022-06-22 RX ADMIN — DIPHENHYDRAMINE HYDROCHLORIDE 12.5 MG: 50 INJECTION INTRAMUSCULAR; INTRAVENOUS at 12:06

## 2022-06-22 NOTE — ED PROVIDER NOTES
Encounter Date: 6/21/2022       History     Chief Complaint   Patient presents with    Headache     Headache x1 day     Patient reports a migraine headache for the past x1 day; pt denies nausea/fever/neck pain    The history is provided by the patient.   Headache   This is a recurrent problem. The current episode started yesterday. The problem occurs intermittently. The problem has been waxing and waning. The pain is located in the bilateral and frontal region. The pain does not radiate. The pain quality is similar to prior headaches. The quality of the pain is described as aching. The pain is at a severity of 5/10. Pertinent negatives include no abdominal pain, abnormal behavior, anorexia, back pain, blurred vision, dizziness, ear pain, eye redness, eye watering, facial sweating, fever, hearing loss, insomnia, loss of balance, muscle aches, nausea, neck pain, numbness, rhinorrhea, scalp tenderness, seizures, sinus pressure, sore throat, swollen glands, tinnitus, visual change, vomiting, weakness or weight loss. Nothing aggravates the symptoms. She has tried acetaminophen and NSAIDs for the symptoms. The treatment provided mild relief. Her past medical history is significant for migraine headaches. There is no history of pseudotumor cerebri, recent head traumas or TMJ.     Review of patient's allergies indicates:  No Known Allergies  Past Medical History:   Diagnosis Date    Hx of colonoscopy      Past Surgical History:   Procedure Laterality Date    COLONOSCOPY       Family History   Problem Relation Age of Onset    Diabetes type II Mother     Hypertension Mother      Social History     Tobacco Use    Smoking status: Never Smoker    Smokeless tobacco: Never Used   Substance Use Topics    Alcohol use: Yes    Drug use: Never     Review of Systems   Constitutional: Negative for fever and weight loss.   HENT: Negative for ear pain, hearing loss, rhinorrhea, sinus pressure, sore throat and tinnitus.    Eyes:  Negative.  Negative for blurred vision and redness.   Respiratory: Negative for shortness of breath.    Cardiovascular: Negative for chest pain.   Gastrointestinal: Negative for abdominal pain, anorexia, nausea and vomiting.   Genitourinary: Negative for dysuria.   Musculoskeletal: Negative for back pain and neck pain.   Skin: Negative for rash.   Neurological: Positive for headaches. Negative for dizziness, seizures, weakness, numbness and loss of balance.   Hematological: Does not bruise/bleed easily.   Psychiatric/Behavioral: Negative.  The patient does not have insomnia.        Physical Exam     Initial Vitals [06/21/22 2139]   BP Pulse Resp Temp SpO2   (!) 164/86 60 20 97 °F (36.1 °C) 100 %      MAP       --         Physical Exam    Constitutional: She appears well-developed and well-nourished.   HENT:   Head: Normocephalic and atraumatic.   Eyes: EOM are normal.   Neck: Neck supple.   Normal range of motion.  Cardiovascular: Normal rate and regular rhythm.   Pulmonary/Chest: Breath sounds normal.   Abdominal: Abdomen is soft. Bowel sounds are normal.   Musculoskeletal:         General: Normal range of motion.      Cervical back: Normal range of motion and neck supple.     Neurological: She is alert and oriented to person, place, and time.   Skin: Skin is warm and dry.   Psychiatric: She has a normal mood and affect. Her behavior is normal. Judgment and thought content normal.         ED Course   Procedures  Labs Reviewed - No data to display       Imaging Results    None          Medications   ketorolac injection 30 mg (has no administration in time range)   metoclopramide HCl injection 10 mg (has no administration in time range)   diphenhydrAMINE injection 12.5 mg (has no administration in time range)                 ED Course as of 06/22/22 0003   Tue Jun 21, 2022   [id number] Patient declined to have a head CT or other diagnostics at this time; pt is requesting medication only  [AL]      ED Course User  Index  [AL] GEORGIE Morris             Clinical Impression:   Final diagnoses:  [G43.909] Migraine without status migrainosus, not intractable, unspecified migraine type (Primary)          ED Disposition Condition    Discharge Stable        ED Prescriptions     Medication Sig Dispense Start Date End Date Auth. Provider    butalbital-acetaminophen-caffeine -40 mg (FIORICET, ESGIC) -40 mg per tablet Take 1 tablet by mouth every 6 (six) hours as needed for Pain. 9 tablet 6/22/2022 6/25/2022 GEORGIE Morris        Follow-up Information     Follow up With Specialties Details Why Contact Info    Dea Campbell[facility]   [address] [address]  [phone]      discharge followup    If your symptoms become WORSE or you DO NOT IMPROVE and you are unable to reach your health care provider, you should RETURN to the emergency department    discharge info    Discussed all pertinent ED information, results, diagnosis and treatment plan; All questions and concerns were addressed at this time. Patient voices understanding of information and instructions. Patient is comfortable with plan and discharge           GEORGIE Morris  06/22/22 0003

## 2022-07-18 ENCOUNTER — OFFICE VISIT (OUTPATIENT)
Dept: INTERNAL MEDICINE | Facility: CLINIC | Age: 62
End: 2022-07-18

## 2022-07-18 DIAGNOSIS — I10 HYPERTENSION, UNSPECIFIED TYPE: ICD-10-CM

## 2022-07-18 DIAGNOSIS — Z00.00 WELL ADULT EXAM: ICD-10-CM

## 2022-07-18 DIAGNOSIS — Z86.010 HISTORY OF COLON POLYPS: ICD-10-CM

## 2022-07-18 DIAGNOSIS — R76.8 POSITIVE ANA (ANTINUCLEAR ANTIBODY): ICD-10-CM

## 2022-07-18 DIAGNOSIS — R76.8 ELEVATED RHEUMATOID FACTOR: ICD-10-CM

## 2022-07-18 DIAGNOSIS — E78.5 HYPERLIPIDEMIA, UNSPECIFIED HYPERLIPIDEMIA TYPE: ICD-10-CM

## 2022-07-18 DIAGNOSIS — E87.6 HYPOKALEMIA: Primary | ICD-10-CM

## 2022-07-18 DIAGNOSIS — R19.5 POSITIVE FIT (FECAL IMMUNOCHEMICAL TEST): ICD-10-CM

## 2022-07-18 DIAGNOSIS — M79.642 BILATERAL HAND PAIN: ICD-10-CM

## 2022-07-18 DIAGNOSIS — M79.605 LEFT LEG PAIN: ICD-10-CM

## 2022-07-18 DIAGNOSIS — M79.641 BILATERAL HAND PAIN: ICD-10-CM

## 2022-07-18 PROCEDURE — 99214 OFFICE O/P EST MOD 30 MIN: CPT | Mod: 95,,, | Performed by: NURSE PRACTITIONER

## 2022-07-18 PROCEDURE — 99214 PR OFFICE/OUTPT VISIT, EST, LEVL IV, 30-39 MIN: ICD-10-PCS | Mod: 95,,, | Performed by: NURSE PRACTITIONER

## 2022-07-18 RX ORDER — TIZANIDINE 4 MG/1
4 TABLET ORAL EVERY 6 HOURS PRN
Qty: 90 TABLET | Refills: 1 | Status: SHIPPED | OUTPATIENT
Start: 2022-07-18 | End: 2024-02-21 | Stop reason: SDUPTHER

## 2022-07-18 RX ORDER — AMLODIPINE BESYLATE 10 MG/1
10 TABLET ORAL DAILY
Qty: 90 TABLET | Refills: 1 | Status: SHIPPED | OUTPATIENT
Start: 2022-07-18 | End: 2022-10-18 | Stop reason: SDUPTHER

## 2022-07-18 RX ORDER — MELOXICAM 7.5 MG/1
7.5 TABLET ORAL 2 TIMES DAILY PRN
Qty: 60 TABLET | Refills: 3 | Status: SHIPPED | OUTPATIENT
Start: 2022-07-18 | End: 2024-02-21 | Stop reason: SDUPTHER

## 2022-07-18 RX ORDER — ATORVASTATIN CALCIUM 10 MG/1
10 TABLET, FILM COATED ORAL DAILY
Qty: 90 TABLET | Refills: 1 | Status: SHIPPED | OUTPATIENT
Start: 2022-07-18 | End: 2022-10-18 | Stop reason: SDUPTHER

## 2022-07-18 RX ORDER — LISINOPRIL 10 MG/1
10 TABLET ORAL DAILY
Qty: 90 TABLET | Refills: 1 | Status: SHIPPED | OUTPATIENT
Start: 2022-07-18 | End: 2022-10-18 | Stop reason: SDUPTHER

## 2022-07-18 RX ORDER — HYDROCHLOROTHIAZIDE 25 MG/1
TABLET ORAL
Qty: 90 TABLET | Refills: 1 | Status: SHIPPED | OUTPATIENT
Start: 2022-07-18 | End: 2022-10-18 | Stop reason: SDUPTHER

## 2022-07-18 RX ORDER — POTASSIUM CHLORIDE 20 MEQ/1
20 TABLET, EXTENDED RELEASE ORAL DAILY
Qty: 90 TABLET | Refills: 1 | Status: SHIPPED | OUTPATIENT
Start: 2022-07-18 | End: 2022-10-18 | Stop reason: SDUPTHER

## 2022-07-18 NOTE — PROGRESS NOTES
Internal Medicine Clinic  [facility]     Patient Name: Laina Macias   : 1960  MRN:[id number]     Review of patient's allergies indicates:  No Known Allergies   Medication List with Changes/Refills   Current Medications    AMLODIPINE (NORVASC) 10 MG TABLET    Take 10 mg by mouth.    ATORVASTATIN (LIPITOR) 10 MG TABLET    Take 10 mg by mouth.    HYDROCHLOROTHIAZIDE (HYDRODIURIL) 25 MG TABLET      See Instructions, TAKE ONE TABLET BY MOUTH EVERY DAY, # 90 tab(s), 3 Refill(s), Pharmacy: [place] [id number] [id number], 162, cm, Height/Length Dosing, 21 7:52:00 CDT, 94.8, kg, Weight Dosing, 21 7:52:00 CDT    MELOXICAM (MOBIC) 7.5 MG TABLET    Take 7.5 mg by mouth.    POTASSIUM CHLORIDE SA (K-DUR,KLOR-CON) 20 MEQ TABLET    Take 20 mEq by mouth.    TIZANIDINE (ZANAFLEX) 4 MG TABLET    Take 1 tablet (4 mg total) by mouth every 6 (six) hours as needed (muscle spasm).        CC:  No chief complaint on file.       HPI  60 y/o female for telemedicine visit for lab results. Pt has hx Cardiomegaly, Hematuria, HTN, Right pulm nodule, Recurrent kidney stones, SOB. Pt was followed in Cardio Cl in past- denies swelling, SOB, cough.  Pt has hx of kidney stones in past.  Pt followed in uro cl.          ROS  Review of Systems   Constitutional: Negative.    HENT: Negative.    Eyes: Negative.    Respiratory: Negative.    Cardiovascular: Negative.    Gastrointestinal: Negative.    Endocrine: Negative.    Genitourinary: Negative.    Musculoskeletal: Negative.    Integumentary:  Negative.   Allergic/Immunologic: Negative.    Neurological: Negative.    Hematological: Negative.    Psychiatric/Behavioral: Negative.         Physical Examination:  There were no vitals filed for this visit.       Physical Exam  Pulmonary:      Effort: Pulmonary effort is normal.   Neurological:      Mental Status: She is alert.   Psychiatric:         Mood and Affect: Mood normal.            Labs/Imaging:  Reviewed    Assessment/Plan:  1.  Hypokalemia  Potassium 3.3 Encouraged potassium rich foods in diet. BMP in 3 months.     2. Hypertension, unspecified type  Low fat low salt diet and exercise. Cont BP med as prescribed.     3. Hyperlipidemia, unspecified hyperlipidemia type  Trigs 196 down from 314. Low fat diet and exercise. Cont atorvastatin as prescribed. FLP in 3 months.     4. Bilateral hand pain  Auto immune labs show ISABEL positive and elevated RF. Refer to Rheum cl for eval and mgmt.     5. Well adult exam  Labs in 3 months. Keep GYN cl appt as scheduled. UTD MMG. Pt had colonoscopy done 10-21-19 with polypectomy X 5- no malignancy. Suggest repeat in 3 yrs (10/2022).  Refer to GI for colonoscopy.     6. Positive FIT (fecal immunochemical test)   Pt had colonoscopy done 10-21-19 with polypectomy X 5- no malignancy. Suggest repeat in 3 yrs (10/2022). Refer to GI for colonoscopy.     7. History of colon polyps  Pt had colonoscopy done 10-21-19 with polypectomy X 5- no malignancy. Suggest repeat in 3 yrs (10/2022). Refer to GI for colonoscopy.     8. Positive ISABEL  Refer to Rheum cl for eval and mgmt.    9. Elevated RF  Refer to Rheum cl for eval and mgmt.        RTC in 3 months with labs 1 week prior to appt.    Established Patient - Audio Only Telehealth Visit     The patient location is: home  The chief complaint leading to consultation is: f/u lab results  Visit type: Virtual visit with audio only (telephone)  Total time spent with patient:34 minutes       The reason for the audio only service rather than synchronous audio and video virtual visit was related to technical difficulties or patient preference/necessity.     Each patient to whom I provide medical services by telemedicine is:  (1) informed of the relationship between the physician and patient and the respective role of any other health care provider with respect to management of the patient; and (2) notified that they may decline to receive medical services by telemedicine and may  withdraw from such care at any time. Patient verbally consented to receive this service via voice-only telephone call.       HPI: see above     Assessment and plan:  See above                        This service was not originating from a related E/M service provided within the previous 7 days nor will  to an E/M service or procedure within the next 24 hours or my soonest available appointment.  Prevailing standard of care was able to be met in this audio-only visit.

## 2022-08-09 ENCOUNTER — DOCUMENTATION ONLY (OUTPATIENT)
Dept: ADMINISTRATIVE | Facility: HOSPITAL | Age: 62
End: 2022-08-09

## 2022-08-22 PROBLEM — Z00.00 WELL ADULT EXAM: Status: RESOLVED | Noted: 2022-05-23 | Resolved: 2022-08-22

## 2022-10-11 ENCOUNTER — TELEPHONE (OUTPATIENT)
Dept: GASTROENTEROLOGY | Facility: CLINIC | Age: 62
End: 2022-10-11

## 2022-10-11 NOTE — TELEPHONE ENCOUNTER
Kayla Malin,    I have a referral on this PT to get her scheduled for a colonoscopy.  She completed one with Dr. Chopra in 2019 and was place on a 3 year recall.      Does this PT still need to see GI for any other reason other than POS FIT?    Thank you,    Lindsey

## 2022-10-18 ENCOUNTER — LAB VISIT (OUTPATIENT)
Dept: LAB | Facility: HOSPITAL | Age: 62
End: 2022-10-18
Attending: NURSE PRACTITIONER

## 2022-10-18 ENCOUNTER — OFFICE VISIT (OUTPATIENT)
Dept: INTERNAL MEDICINE | Facility: CLINIC | Age: 62
End: 2022-10-18

## 2022-10-18 VITALS
WEIGHT: 209 LBS | TEMPERATURE: 98 F | BODY MASS INDEX: 39.46 KG/M2 | SYSTOLIC BLOOD PRESSURE: 123 MMHG | HEIGHT: 61 IN | DIASTOLIC BLOOD PRESSURE: 83 MMHG | RESPIRATION RATE: 18 BRPM | HEART RATE: 65 BPM

## 2022-10-18 DIAGNOSIS — Z00.00 WELL ADULT EXAM: ICD-10-CM

## 2022-10-18 DIAGNOSIS — E87.6 HYPOKALEMIA: ICD-10-CM

## 2022-10-18 DIAGNOSIS — I10 HYPERTENSION, UNSPECIFIED TYPE: ICD-10-CM

## 2022-10-18 DIAGNOSIS — E78.5 HYPERLIPIDEMIA, UNSPECIFIED HYPERLIPIDEMIA TYPE: ICD-10-CM

## 2022-10-18 DIAGNOSIS — M25.512 LEFT SHOULDER PAIN, UNSPECIFIED CHRONICITY: ICD-10-CM

## 2022-10-18 DIAGNOSIS — Z12.31 BREAST CANCER SCREENING BY MAMMOGRAM: ICD-10-CM

## 2022-10-18 DIAGNOSIS — R19.5 POSITIVE FIT (FECAL IMMUNOCHEMICAL TEST): ICD-10-CM

## 2022-10-18 DIAGNOSIS — I10 HYPERTENSION, UNSPECIFIED TYPE: Primary | ICD-10-CM

## 2022-10-18 DIAGNOSIS — Z13.1 SCREENING FOR DIABETES MELLITUS: ICD-10-CM

## 2022-10-18 LAB
ANION GAP SERPL CALC-SCNC: 11 MEQ/L
BUN SERPL-MCNC: 20.2 MG/DL (ref 9.8–20.1)
CALCIUM SERPL-MCNC: 9.3 MG/DL (ref 8.4–10.2)
CHLORIDE SERPL-SCNC: 108 MMOL/L (ref 98–107)
CHOLEST SERPL-MCNC: 193 MG/DL
CHOLEST/HDLC SERPL: 6 {RATIO} (ref 0–5)
CO2 SERPL-SCNC: 25 MMOL/L (ref 23–31)
CREAT SERPL-MCNC: 0.76 MG/DL (ref 0.55–1.02)
CREAT/UREA NIT SERPL: 27
GFR SERPLBLD CREATININE-BSD FMLA CKD-EPI: >60 MLS/MIN/1.73/M2
GLUCOSE SERPL-MCNC: 104 MG/DL (ref 82–115)
HDLC SERPL-MCNC: 34 MG/DL (ref 35–60)
LDLC SERPL CALC-MCNC: 113 MG/DL (ref 50–140)
POTASSIUM SERPL-SCNC: 3.5 MMOL/L (ref 3.5–5.1)
SODIUM SERPL-SCNC: 144 MMOL/L (ref 136–145)
TRIGL SERPL-MCNC: 229 MG/DL (ref 37–140)
VLDLC SERPL CALC-MCNC: 46 MG/DL

## 2022-10-18 PROCEDURE — 99214 OFFICE O/P EST MOD 30 MIN: CPT | Mod: S$PBB,,, | Performed by: NURSE PRACTITIONER

## 2022-10-18 PROCEDURE — 80048 BASIC METABOLIC PNL TOTAL CA: CPT

## 2022-10-18 PROCEDURE — 99214 PR OFFICE/OUTPT VISIT, EST, LEVL IV, 30-39 MIN: ICD-10-PCS | Mod: S$PBB,,, | Performed by: NURSE PRACTITIONER

## 2022-10-18 PROCEDURE — 80061 LIPID PANEL: CPT

## 2022-10-18 PROCEDURE — 36415 COLL VENOUS BLD VENIPUNCTURE: CPT

## 2022-10-18 PROCEDURE — 99214 OFFICE O/P EST MOD 30 MIN: CPT | Mod: PBBFAC | Performed by: NURSE PRACTITIONER

## 2022-10-18 RX ORDER — LISINOPRIL 10 MG/1
10 TABLET ORAL DAILY
Qty: 90 TABLET | Refills: 1 | Status: SHIPPED | OUTPATIENT
Start: 2022-10-18 | End: 2023-01-18 | Stop reason: SDUPTHER

## 2022-10-18 RX ORDER — AMLODIPINE BESYLATE 10 MG/1
10 TABLET ORAL DAILY
Qty: 90 TABLET | Refills: 1 | Status: SHIPPED | OUTPATIENT
Start: 2022-10-18 | End: 2023-01-18 | Stop reason: SDUPTHER

## 2022-10-18 RX ORDER — POTASSIUM CHLORIDE 20 MEQ/1
20 TABLET, EXTENDED RELEASE ORAL DAILY
Qty: 90 TABLET | Refills: 1 | Status: SHIPPED | OUTPATIENT
Start: 2022-10-18 | End: 2022-11-12

## 2022-10-18 RX ORDER — ATORVASTATIN CALCIUM 10 MG/1
10 TABLET, FILM COATED ORAL NIGHTLY
Qty: 90 TABLET | Refills: 1 | Status: SHIPPED | OUTPATIENT
Start: 2022-10-18 | End: 2023-01-18 | Stop reason: SDUPTHER

## 2022-10-18 RX ORDER — METHYLPREDNISOLONE 4 MG/1
TABLET ORAL
Qty: 21 EACH | Refills: 0 | Status: SHIPPED | OUTPATIENT
Start: 2022-10-18 | End: 2022-11-08

## 2022-10-18 RX ORDER — HYDROCHLOROTHIAZIDE 25 MG/1
TABLET ORAL
Qty: 90 TABLET | Refills: 1 | Status: SHIPPED | OUTPATIENT
Start: 2022-10-18 | End: 2023-01-18 | Stop reason: SDUPTHER

## 2022-10-18 NOTE — PROGRESS NOTES
[facility]  ROSA Callahan     Patient Name: Laina Macias   : 1960  MRN:[id number]     Review of patient's allergies indicates:  No Known Allergies   Medication List with Changes/Refills   Current Medications    AMLODIPINE (NORVASC) 10 MG TABLET    Take 1 tablet (10 mg total) by mouth once daily.    ATORVASTATIN (LIPITOR) 10 MG TABLET    Take 1 tablet (10 mg total) by mouth once daily.    HYDROCHLOROTHIAZIDE (HYDRODIURIL) 25 MG TABLET    See Instructions, TAKE ONE TABLET BY MOUTH EVERY DAY, # 90 tab(s), 1 Refill(s), Pharmacy: [id number], 162, cm, Height/Length Dosing, 21 7:52:00 CDT, 94.8, kg, Weight Dosing, 21 7:52:00 CDT    LISINOPRIL 10 MG TABLET    Take 1 tablet (10 mg total) by mouth once daily.    MELOXICAM (MOBIC) 7.5 MG TABLET    Take 1 tablet (7.5 mg total) by mouth 2 (two) times daily as needed for Pain.    POTASSIUM CHLORIDE SA (K-DUR,KLOR-CON) 20 MEQ TABLET    Take 1 tablet (20 mEq total) by mouth once daily.    TIZANIDINE (ZANAFLEX) 4 MG TABLET    Take 1 tablet (4 mg total) by mouth every 6 (six) hours as needed (muscle spasm).        CC:  Chief Complaint   Patient presents with    lab results        HPI  61 y/o female for f/u lab results. Pt has hx Cardiomegaly, Hematuria, HTN, Right pulm nodule, Recurrent kidney stones, SOB. Pt was followed in Cardio Cl in past- denies swelling, SOB, cough.  Pt has hx of kidney stones in past.  Pt followed in uro cl.          ROS  Review of Systems   Constitutional: Negative.    HENT: Negative.     Eyes: Negative.    Respiratory: Negative.     Cardiovascular: Negative.    Gastrointestinal: Negative.    Endocrine: Negative.    Genitourinary: Negative.    Musculoskeletal: Negative.    Integumentary:  Negative.   Allergic/Immunologic: Negative.    Neurological: Negative.    Hematological: Negative.    Psychiatric/Behavioral: Negative.        Physical Examination:  Vitals:    10/18/22 0851   BP: 123/83   Pulse: 65    Resp: 18   Temp: 98.2 °F (36.8 °C)          Physical Exam  Vitals reviewed.   Constitutional:       Appearance: Normal appearance. She is normal weight.   HENT:      Head: Normocephalic.   Cardiovascular:      Rate and Rhythm: Normal rate and regular rhythm.      Pulses: Normal pulses.      Heart sounds: Normal heart sounds.   Pulmonary:      Effort: Pulmonary effort is normal.      Breath sounds: Normal breath sounds.   Abdominal:      General: Abdomen is flat.      Palpations: Abdomen is soft.   Musculoskeletal:         General: Normal range of motion.      Cervical back: Normal range of motion.   Skin:     General: Skin is warm and dry.   Neurological:      Mental Status: She is alert.   Psychiatric:         Mood and Affect: Mood normal.          Labs/Imaging:  Reviewed    Assessment/Plan:  1. Hypertension, unspecified type  - CBC Auto Differential; Future  Labs in 3 months. Low salt low fat diet and exercise. Cont BP med as prescribed.     2. Hyperlipidemia, unspecified hyperlipidemia type  - Lipid Panel; Future  Low fat diet and exercise encouraged. Cont Atorvastatin as prescribed. FLP in 3 months.    3. Hypokalemia  - Basic Metabolic Panel; Future  Potassium 3.5. Cont potassium as prescribed.     4. Positive FIT (fecal immunochemical test)   Pt had colonoscopy done 10-21-19 with polypectomy X 5- no malignancy. Suggest repeat in 3 yrs (10/2022). Referred to GI for colonoscopy 7-18-22. Appt pending.    5. Screening for diabetes mellitus  - Hemoglobin A1C; Future  A1c in 3  months.     6. Well adult exam  Labs in 3 months. MMG with REBEKA in 1 month. Keep GYN cl appt as scheduled. Pt had colonoscopy done 10-21-19 with polypectomy X 5- no malignancy. Suggest repeat in 3 yrs (10/2022). Referred to GI for colonoscopy 7-18-22. Appt pending.    7. Breast cancer screening by mammogram  - Mammo Digital Screening Bilat w/ Rebeka; Future   MMG with REBEKA in 1 month.    RTC in 3 months with labs 1 week prior to appt.

## 2022-12-27 ENCOUNTER — HOSPITAL ENCOUNTER (OUTPATIENT)
Dept: RADIOLOGY | Facility: HOSPITAL | Age: 62
Discharge: HOME | End: 2022-12-27
Attending: NURSE PRACTITIONER

## 2022-12-27 DIAGNOSIS — Z12.31 BREAST CANCER SCREENING BY MAMMOGRAM: ICD-10-CM

## 2022-12-27 PROCEDURE — 77063 MAMMO DIGITAL SCREENING BILAT WITH TOMO: ICD-10-PCS | Mod: 26,,, | Performed by: RADIOLOGY

## 2022-12-27 PROCEDURE — 77067 MAMMO DIGITAL SCREENING BILAT WITH TOMO: ICD-10-PCS | Mod: 26,,, | Performed by: RADIOLOGY

## 2022-12-27 PROCEDURE — 77067 SCR MAMMO BI INCL CAD: CPT | Mod: TC

## 2022-12-27 PROCEDURE — 77063 BREAST TOMOSYNTHESIS BI: CPT | Mod: 26,,, | Performed by: RADIOLOGY

## 2022-12-27 PROCEDURE — 77063 BREAST TOMOSYNTHESIS BI: CPT | Mod: TC

## 2022-12-27 PROCEDURE — 77067 SCR MAMMO BI INCL CAD: CPT | Mod: 26,,, | Performed by: RADIOLOGY

## 2023-01-13 ENCOUNTER — OFFICE VISIT (OUTPATIENT)
Dept: GYNECOLOGY | Facility: CLINIC | Age: 63
End: 2023-01-13
Payer: COMMERCIAL

## 2023-01-13 VITALS
HEART RATE: 76 BPM | OXYGEN SATURATION: 100 % | WEIGHT: 211.63 LBS | SYSTOLIC BLOOD PRESSURE: 134 MMHG | RESPIRATION RATE: 18 BRPM | HEIGHT: 61 IN | TEMPERATURE: 98 F | BODY MASS INDEX: 39.95 KG/M2 | DIASTOLIC BLOOD PRESSURE: 86 MMHG

## 2023-01-13 DIAGNOSIS — Z12.4 PAP SMEAR FOR CERVICAL CANCER SCREENING: Primary | ICD-10-CM

## 2023-01-13 PROCEDURE — 99214 OFFICE O/P EST MOD 30 MIN: CPT | Mod: PBBFAC | Performed by: NURSE PRACTITIONER

## 2023-01-13 PROCEDURE — 99396 PREV VISIT EST AGE 40-64: CPT | Mod: S$PBB,,, | Performed by: NURSE PRACTITIONER

## 2023-01-13 PROCEDURE — 99396 PR PREVENTIVE VISIT,EST,40-64: ICD-10-PCS | Mod: S$PBB,,, | Performed by: NURSE PRACTITIONER

## 2023-01-13 NOTE — PROGRESS NOTES
"  Subjective:       Patient ID: Laina Macias is a 62 y.o. female.    Chief Complaint:  Gynecologic Exam      History of Present Illness  Pt is  here for annual GYN exam. History of tubal pregnancy with unilateral salpingo-oophorectomy, unsure of which side. History of abnormal pap smear in 2019-NIL and HPV positive (16), repeat pap in -NIL and HPV neg. Pt is postmenopausal. Denies hot flashes. Denies abdominal or pelvic pain. Denies vaginal bleeding, itching, or discharge. Denies history of or concern for STI's. Last mammogram 22- BIRADS 1. Denies tobacco use. Colonoscopy in 2019 with polypectomy, repeat in 3 years, pending per PCP. Denies fly hx of breast, ovarian, uterine and colon. Pt is followed by IM for primary care.    GYN & OB History  No LMP recorded. Patient is postmenopausal.       Review of patient's allergies indicates:  No Known Allergies  Past Medical History:   Diagnosis Date    Anemia     Hypertension     Personal history of colonic polyps      OB History    Para Term  AB Living   4 3     1     SAB IAB Ectopic Multiple Live Births       1          # Outcome Date GA Lbr Kamlesh/2nd Weight Sex Delivery Anes PTL Lv   4 Ectopic            3 Para            2 Para            1 Para                 Review of Systems  Review of Systems    Negative except for pertinent findings for positives per HPI     Objective:    Physical Exam    /86 (BP Location: Left arm, Patient Position: Sitting, BP Method: Medium (Automatic))   Pulse 76   Temp 97.9 °F (36.6 °C) (Oral)   Resp 18   Ht 5' 1" (1.549 m)   Wt 96 kg (211 lb 9.6 oz)   SpO2 100%   BMI 39.98 kg/m²   GENERAL: Well-developed female in no acute distress.  SKIN: Normal to inspection, warm and intact.  BREASTS: No masses, lumps, discharge, tenderness.  VULVA: General appearance normal; external genitalia with no lesions or erythema.  VAGINA: Mucosa atrophic, no abnormal discharge or lesions.  CERVIX: Atrophic with bleeding " with pap, no abnormal discharge.  BIMANUAL EXAM: limited exam d/t body habitus.The uterus is non tender. Mahad adnexa reveal no tenderness.  PSYCHIATRIC: Patient is oriented to person, place, and time. Mood and affect are normal.    Assessment:       1. Pap smear for cervical cancer screening       Plan:   Laina was seen today for gynecologic exam.    Diagnoses and all orders for this visit:    Pap smear for cervical cancer screening  -     Liquid-Based Pap Smear, Screening Screening    Pap today  Call clinic with any vaginal bleeding which would be abnormal.  Follow up in about 1 year (around 1/13/2024) for annual exam.

## 2023-01-18 ENCOUNTER — TELEPHONE (OUTPATIENT)
Dept: GASTROENTEROLOGY | Facility: CLINIC | Age: 63
End: 2023-01-18
Payer: COMMERCIAL

## 2023-01-18 ENCOUNTER — OFFICE VISIT (OUTPATIENT)
Dept: INTERNAL MEDICINE | Facility: CLINIC | Age: 63
End: 2023-01-18
Payer: COMMERCIAL

## 2023-01-18 VITALS
TEMPERATURE: 98 F | BODY MASS INDEX: 40.02 KG/M2 | HEART RATE: 60 BPM | HEIGHT: 61 IN | RESPIRATION RATE: 18 BRPM | SYSTOLIC BLOOD PRESSURE: 139 MMHG | DIASTOLIC BLOOD PRESSURE: 85 MMHG | WEIGHT: 212 LBS

## 2023-01-18 DIAGNOSIS — Z00.00 WELL ADULT EXAM: ICD-10-CM

## 2023-01-18 DIAGNOSIS — E66.01 SEVERE OBESITY (BMI 35.0-39.9) WITH COMORBIDITY: ICD-10-CM

## 2023-01-18 DIAGNOSIS — R19.5 POSITIVE FIT (FECAL IMMUNOCHEMICAL TEST): ICD-10-CM

## 2023-01-18 DIAGNOSIS — I10 PRIMARY HYPERTENSION: Primary | ICD-10-CM

## 2023-01-18 DIAGNOSIS — Z23 NEED FOR VACCINATION: ICD-10-CM

## 2023-01-18 DIAGNOSIS — Z86.010 HISTORY OF COLON POLYPS: ICD-10-CM

## 2023-01-18 DIAGNOSIS — E87.6 HYPOKALEMIA: ICD-10-CM

## 2023-01-18 DIAGNOSIS — E78.1 PURE HYPERTRIGLYCERIDEMIA: ICD-10-CM

## 2023-01-18 PROCEDURE — 3079F PR MOST RECENT DIASTOLIC BLOOD PRESSURE 80-89 MM HG: ICD-10-PCS | Mod: CPTII,,, | Performed by: NURSE PRACTITIONER

## 2023-01-18 PROCEDURE — 90686 IIV4 VACC NO PRSV 0.5 ML IM: CPT | Mod: PBBFAC

## 2023-01-18 PROCEDURE — 99214 PR OFFICE/OUTPT VISIT, EST, LEVL IV, 30-39 MIN: ICD-10-PCS | Mod: S$PBB,,, | Performed by: NURSE PRACTITIONER

## 2023-01-18 PROCEDURE — 3075F SYST BP GE 130 - 139MM HG: CPT | Mod: CPTII,,, | Performed by: NURSE PRACTITIONER

## 2023-01-18 PROCEDURE — 1160F PR REVIEW ALL MEDS BY PRESCRIBER/CLIN PHARMACIST DOCUMENTED: ICD-10-PCS | Mod: CPTII,,, | Performed by: NURSE PRACTITIONER

## 2023-01-18 PROCEDURE — 99214 OFFICE O/P EST MOD 30 MIN: CPT | Mod: PBBFAC,25 | Performed by: NURSE PRACTITIONER

## 2023-01-18 PROCEDURE — 3075F PR MOST RECENT SYSTOLIC BLOOD PRESS GE 130-139MM HG: ICD-10-PCS | Mod: CPTII,,, | Performed by: NURSE PRACTITIONER

## 2023-01-18 PROCEDURE — 90471 IMMUNIZATION ADMIN: CPT | Mod: PBBFAC

## 2023-01-18 PROCEDURE — 1159F PR MEDICATION LIST DOCUMENTED IN MEDICAL RECORD: ICD-10-PCS | Mod: CPTII,,, | Performed by: NURSE PRACTITIONER

## 2023-01-18 PROCEDURE — 3008F BODY MASS INDEX DOCD: CPT | Mod: CPTII,,, | Performed by: NURSE PRACTITIONER

## 2023-01-18 PROCEDURE — 1159F MED LIST DOCD IN RCRD: CPT | Mod: CPTII,,, | Performed by: NURSE PRACTITIONER

## 2023-01-18 PROCEDURE — 99214 OFFICE O/P EST MOD 30 MIN: CPT | Mod: S$PBB,,, | Performed by: NURSE PRACTITIONER

## 2023-01-18 PROCEDURE — 1160F RVW MEDS BY RX/DR IN RCRD: CPT | Mod: CPTII,,, | Performed by: NURSE PRACTITIONER

## 2023-01-18 PROCEDURE — 3008F PR BODY MASS INDEX (BMI) DOCUMENTED: ICD-10-PCS | Mod: CPTII,,, | Performed by: NURSE PRACTITIONER

## 2023-01-18 PROCEDURE — 3079F DIAST BP 80-89 MM HG: CPT | Mod: CPTII,,, | Performed by: NURSE PRACTITIONER

## 2023-01-18 RX ORDER — AMLODIPINE BESYLATE 10 MG/1
10 TABLET ORAL DAILY
Qty: 90 TABLET | Refills: 1 | Status: SHIPPED | OUTPATIENT
Start: 2023-01-18 | End: 2023-04-18 | Stop reason: SDUPTHER

## 2023-01-18 RX ORDER — ATORVASTATIN CALCIUM 10 MG/1
10 TABLET, FILM COATED ORAL NIGHTLY
Qty: 90 TABLET | Refills: 1 | Status: SHIPPED | OUTPATIENT
Start: 2023-01-18 | End: 2023-04-18 | Stop reason: SDUPTHER

## 2023-01-18 RX ORDER — HYDROCHLOROTHIAZIDE 25 MG/1
TABLET ORAL
Qty: 90 TABLET | Refills: 1 | Status: SHIPPED | OUTPATIENT
Start: 2023-01-18 | End: 2023-04-18 | Stop reason: SDUPTHER

## 2023-01-18 RX ORDER — LISINOPRIL 10 MG/1
10 TABLET ORAL DAILY
Qty: 90 TABLET | Refills: 1 | Status: SHIPPED | OUTPATIENT
Start: 2023-01-18 | End: 2023-04-18 | Stop reason: SDUPTHER

## 2023-01-18 RX ADMIN — INFLUENZA VIRUS VACCINE 0.5 ML: 15; 15; 15; 15 SUSPENSION INTRAMUSCULAR at 09:01

## 2023-01-18 NOTE — PROGRESS NOTES
Patient ID: [id number]     Chief Complaint: lab results        HPI:     HPI      Laina Macias is a 62 y.o. female here today for a follow up. No other complaints today.         ----------------------------  Anemia  Hypertension  Personal history of colonic polyps     Past Surgical History:   Procedure Laterality Date     SECTION      COLONOSCOPY  10/21/2019    ectopic pregnancy N/A        Review of patient's allergies indicates:  No Known Allergies    Current Outpatient Medications   Medication Instructions    amLODIPine (NORVASC) 10 mg, Oral, Daily    atorvastatin (LIPITOR) 10 mg, Oral, Nightly    hydroCHLOROthiazide (HYDRODIURIL) 25 MG tablet See Instructions, TAKE ONE TABLET BY MOUTH EVERY DAY, # 90 tab(s), 1 Refill(s), Pharmacy: [id number], 162, cm, Height/Length Dosing, 21 7:52:00 CDT, 94.8, kg, Weight Dosing, 21 7:52:00 CDT    lisinopriL 10 mg, Oral, Daily    meloxicam (MOBIC) 7.5 mg, Oral, 2 times daily PRN    potassium chloride (K-TAB) 20 mEq TAKE ONE TABLET BY MOUTH EVERY DAY    tiZANidine (ZANAFLEX) 4 mg, Oral, Every 6 hours PRN       Social History     Socioeconomic History    Marital status: Single   Tobacco Use    Smoking status: Never    Smokeless tobacco: Never   Substance and Sexual Activity    Alcohol use: Yes    Drug use: Never    Sexual activity: Not Currently     Partners: Male        Family History   Problem Relation Age of Onset    Diabetes type II Mother     Hypertension Mother         Patient Care Team:  ROSA Fortune as PCP - General (Family Medicine)     Subjective:     Review of Systems     See HPI for details    Constitutional: Denies Change in appetite. Denies Chills. Denies Fever. Denies Night sweats.  Eye: Denies Blurred vision. Denies Discharge. Denies Eye pain.  ENT: Denies Decreased hearing. Denies Sore throat. Denies Swollen glands.  Respiratory: Denies Cough. Denies Shortness of breath. Denies Shortness of breath with exertion. Denies  "Wheezing.  Cardiovascular: DeniesChest pain at rest. Denies Chest pain with exertion. Denies Irregular heartbeat. Denies Palpitations. Denies Edema.  Gastrointestinal: Denies Abdominal pain. DeniesDiarrhea. Denies Nausea. Denies Vomiting. Denies Hematemesis or Hematochezia.  Genitourinary: Denies Dysuria. Denies Urinary frequency. Denies Urinary urgency. Denies Blood in urine.  Endocrine: Denies Cold intolerance. Denies Excessive thirst. Denies Heat intolerance. Denies Weight loss. Denies Weight gain.  Musculoskeletal: Denies Painful joints. Denies Weakness.  Integumentary: Denies Rash. Denies Itching. Denies Dry skin.  Neurologic: Denies Dizziness. Denies Fainting. Denies Headache.  Psychiatric: Denies Depression. Denies Anxiety. Denies Suicidal/Homicidal ideations.    All Other ROS: Negative except as stated in HPI.       Objective:     Visit Vitals  /85 (BP Location: Left arm, Patient Position: Sitting, BP Method: Medium (Automatic))   Pulse 60   Temp 98.2 °F (36.8 °C) (Oral)   Resp 18   Ht 5' 1" (1.549 m)   Wt 96.2 kg (212 lb)   BMI 40.06 kg/m²       Physical Exam    General: Alert and oriented, No acute distress.  Head: Normocephalic, Atraumatic.  Eye: Pupils are equal, round and reactive to light, Extraocular movements are intact, Sclera non-icteric.  Ears/Nose/Throat: Normal, Mucosa moist,Clear.  Neck/Thyroid: Supple, Non-tender, No carotid bruit, No lymphadenopathy, No JVD, Full range of motion.  Respiratory: Clear to auscultation bilaterally; No wheezes, rales or rhonchi,Non-labored respirations, Symmetrical chest wall expansion.  Cardiovascular: Regular rate and rhythm, S1/S2 normal, No murmurs, rubs or gallops.  Gastrointestinal: Soft, Non-tender, Non-distended, Normal bowel sounds, No palpable organomegaly.  Musculoskeletal: Normal range of motion.  Integumentary: Warm, Dry, Intact, No suspicious lesions or rashes.  Extremities: No clubbing, cyanosis or edema  Neurologic: No focal deficits, Cranial " Nerves II-XII are grossly intact, Motor strength normal upper and lower extremities, Sensory exam intact.  Psychiatric: Normal interaction, Coherent speech, Euthymic mood, Appropriate affect       Labs Reviewed:     Chemistry:  Lab Results   Component Value Date     01/13/2023    K 3.4 (L) 01/13/2023    CHLORIDE 106 01/13/2023    BUN 14.6 01/13/2023    CREATININE 0.81 01/13/2023    EGFRNORACEVR >60 01/13/2023    GLUCOSE 112 01/13/2023    CALCIUM 9.5 01/13/2023    ALKPHOS 47 05/24/2022    LABPROT 7.4 05/24/2022    ALBUMIN 3.5 05/24/2022    BILIDIR 0.1 05/08/2021    IBILI 0.30 05/08/2021    AST 16 05/24/2022    ALT 12 05/24/2022    IXTSUSAI43SM 39.3 05/08/2021        Lab Results   Component Value Date    HGBA1C 5.6 01/13/2023        Hematology:  Lab Results   Component Value Date    WBC 7.3 01/13/2023    HGB 14.1 01/13/2023    HCT 40.6 01/13/2023     01/13/2023       Lipid Panel:  Lab Results   Component Value Date    CHOL 145 01/13/2023    HDL 39 01/13/2023    LDL 73.00 01/13/2023    TRIG 163 (H) 01/13/2023    TOTALCHOLEST 4 01/13/2023        Urine:  Lab Results   Component Value Date    APPEARANCEUA Clear 11/07/2020    PROTEINUA 50 (A) 11/07/2020    LEUKOCYTESUR Negative 11/07/2020    RBCUA 11-25 (A) 11/07/2020    WBCUA 0-2 11/07/2020    BACTERIA None Seen 11/07/2020    SQEPUA 2-20 11/07/2020    HYALINECASTS 0-2 (A) 11/07/2020        Assessment:       ICD-10-CM ICD-9-CM   1. Primary hypertension  I10 401.9   2. Pure hypertriglyceridemia  E78.1 272.1   3. Hypokalemia  E87.6 276.8   4. Positive FIT (fecal immunochemical test)  R19.5 792.1   5. Well adult exam  Z00.00 V70.0   6. Severe obesity (BMI 35.0-39.9) with comorbidity  E66.01 278.01   7. Need for vaccination  Z23 V05.9   8. History of colon polyps  Z86.010 V12.72        Plan:     1. Primary hypertension  Low fat low salt diet and exercise. Cont Amlodipine,  HCTZ, Lisinopril as prescribed.     2. Pure hypertriglyceridemia  Trigs 163 down from 229.  Low fat diet and exercise. Cont Atorvastatin as prescribed. FLP in 6 months.    3. Hypokalemia  Cont Potassium supplement as prescribed. Potassium level 3.4. CMP in 3 months.    4. Positive FIT (fecal immunochemical test)  Colonoscopy done 10-21-19 with polyps X 5 no malignancy suggest repeat in 3 years( due 10/2022). Pt was referred to GI 7-18-22 however no colonoscopy appt seen in computer. Refer to Endo  for colonoscopy. Keep appt when scheduled.     5. Well adult exam  Labs in 3 months. UTD GYN. UTD MMG. Colonoscopy done 10-21-19 with polyps X 5 no malignancy suggest repeat in 3 years( due 10/2022). Pt was referred to GI 7-18-22 however no colonoscopy appt seen in computer. Refer to Endo  for colonoscopy. Keep appt when scheduled.     6. Morbid Obesity  BMI 39.98. Encouraged low carb low fat diet and exercise to promote weight loss.    7. Need for vaccination  Give flu vaccine today.    8. History of colon polyps  Colonoscopy done 10-21-19 with polyps X 5 no malignancy suggest repeat in 3 years( due 10/2022). Pt was referred to GI 7-18-22 however no colonoscopy appt seen in computer. Refer to Endo  for colonoscopy. Keep appt when scheduled.          Follow up in about 3 months (around 4/18/2023) for with labs 1 week prior to appt. In addition to their scheduled follow up, the patient has also been instructed to follow up on as needed basis.     Future Appointments   Date Time Provider Department Center   1/19/2024  8:10 AM BRIANA Whitley [facility] GYN [facility]        ROSA Callahan

## 2023-01-19 DIAGNOSIS — Z12.11 SCREEN FOR COLON CANCER: Primary | ICD-10-CM

## 2023-01-20 LAB
INSULIN SERPL-ACNC: NORMAL U[IU]/ML
LAB AP BETHESDA CATEGORY: NORMAL
LAB AP CLINICAL FINDINGS: NORMAL
LAB AP CONTRACEPTIVES: NORMAL
LAB AP GYN MOLECULAR TESTING: NORMAL
LAB AP LMP DATE: NORMAL
LAB AP OCHS PAP SPECIMEN ADEQUACY: NORMAL
LAB AP OHS PAP INTERPRETATION: NORMAL
LAB AP PAP DISCLAIMER COMMENTS: NORMAL
LAB AP PAP ESTROGEN REPLACEMENT THERAPY: NORMAL
LAB AP PAP PMP: NORMAL
LAB AP PAP PREVIOUS BX: NORMAL
LAB AP PAP PRIOR TREATMENT: NORMAL

## 2023-01-20 RX ORDER — POLYETHYLENE GLYCOL 3350, SODIUM SULFATE, SODIUM CHLORIDE, POTASSIUM CHLORIDE, SODIUM ASCORBATE, AND ASCORBIC ACID 7.5-2.691G
2000 KIT ORAL ONCE
Qty: 1 KIT | Refills: 0 | Status: SHIPPED | OUTPATIENT
Start: 2023-01-20 | End: 2023-01-20

## 2023-01-23 PROBLEM — Z00.00 WELL ADULT EXAM: Status: RESOLVED | Noted: 2022-05-23 | Resolved: 2023-01-23

## 2023-01-23 PROBLEM — Z13.1 SCREENING FOR DIABETES MELLITUS: Status: RESOLVED | Noted: 2022-10-18 | Resolved: 2023-01-23

## 2023-01-25 ENCOUNTER — ANESTHESIA EVENT (OUTPATIENT)
Dept: ENDOSCOPY | Facility: HOSPITAL | Age: 63
End: 2023-01-25
Payer: COMMERCIAL

## 2023-01-25 NOTE — ANESTHESIA PREPROCEDURE EVALUATION
"                                                                                                             01/25/2023  Laina Macias is a 62 y.o., female with PMHx of obesity, HTN, HLD presents for colonoscopy secondary to +FIT.    NO BETA BLOCKER USE    Active Ambulatory Problems     Diagnosis Date Noted    Bilateral hand numbness 05/23/2022    Bilateral hand pain 05/23/2022    Cardiomegaly 05/23/2022    Chest pain 05/23/2022    Ganglion cyst 05/23/2022    Heel pain, bilateral 05/23/2022    Hematuria 05/23/2022    HLD (hyperlipidemia) 05/23/2022    HTN (hypertension) 05/23/2022    Hypokalemia 05/23/2022    Left leg pain 05/23/2022    Severe obesity (BMI 35.0-39.9) with comorbidity     Positive FIT (fecal immunochemical test) 05/23/2022    Prediabetes 05/23/2022    Pulmonary nodule 05/23/2022    Recurrent kidney stones 05/23/2022    Right shoulder pain 05/23/2022    Vitamin D deficiency 05/23/2022    Breast cancer screening by mammogram 10/18/2022     Resolved Ambulatory Problems     Diagnosis Date Noted    Well adult exam 05/23/2022    Screening for diabetes mellitus 10/18/2022     Past Medical History:   Diagnosis Date    Anemia     Hypertension     Personal history of colonic polyps        Pre-op Assessment    I have reviewed the NPO Status.      Review of Systems  Anesthesia Hx:  No problems with previous Anesthesia    Social:  Non-Smoker    Cardiovascular:   Hypertension, well controlled hyperlipidemia    Pulmonary:  Pulmonary Normal    Renal/:  Renal/ Normal     Hepatic/GI:  Hepatic/GI Normal    Neurological:  Neurology Normal    Endocrine:  Obesity / BMI > 30    Vitals:    01/26/23 0735 01/26/23 0900   BP: 136/74 116/66   BP Location: Left arm    Patient Position: Lying    Pulse: 76 79   Resp: 16 18   Temp: 36.5 °C (97.7 °F)    TempSrc: Oral    SpO2: 99% 97%   Weight: 93.7 kg (206 lb 9.1 oz)    Height: 5' 2" (1.575 m)          Physical Exam  General: Alert, Cooperative and " Well nourished    Airway:  Mallampati: II   Mouth Opening: Normal  TM Distance: Normal  Tongue: Normal  Neck ROM: Normal ROM    Dental:  Intact    Chest/Lungs:  Clear to auscultation, Normal Respiratory Rate    Heart:  Rate: Normal  Rhythm: Regular Rhythm  Sounds: Normal      Lab Results   Component Value Date    WBC 7.3 01/13/2023    HGB 14.1 01/13/2023    HCT 40.6 01/13/2023    MCV 80.7 01/13/2023     01/13/2023       CMP  Sodium Level   Date Value Ref Range Status   01/13/2023 143 136 - 145 mmol/L Final     Potassium Level   Date Value Ref Range Status   01/13/2023 3.4 (L) 3.5 - 5.1 mmol/L Final     Carbon Dioxide   Date Value Ref Range Status   01/13/2023 28 23 - 31 mmol/L Final     Blood Urea Nitrogen   Date Value Ref Range Status   01/13/2023 14.6 9.8 - 20.1 mg/dL Final     Creatinine   Date Value Ref Range Status   01/13/2023 0.81 0.55 - 1.02 mg/dL Final     Calcium Level Total   Date Value Ref Range Status   01/13/2023 9.5 8.4 - 10.2 mg/dL Final     Albumin Level   Date Value Ref Range Status   05/24/2022 3.5 3.4 - 4.8 gm/dL Final     Bilirubin Total   Date Value Ref Range Status   05/24/2022 0.4 <=1.5 mg/dL Final     Alkaline Phosphatase   Date Value Ref Range Status   05/24/2022 47 40 - 150 unit/L Final     Aspartate Aminotransferase   Date Value Ref Range Status   05/24/2022 16 5 - 34 unit/L Final     Alanine Aminotransferase   Date Value Ref Range Status   05/24/2022 12 0 - 55 unit/L Final     eGFR   Date Value Ref Range Status   01/13/2023 >60 mls/min/1.73/m2 Final     Lab Results   Component Value Date    HGBA1C 5.6 01/13/2023         Anesthesia Plan  Type of Anesthesia, risks & benefits discussed:    Anesthesia Type: Gen Natural Airway  Intra-op Monitoring Plan: Standard ASA Monitors  Post Op Pain Control Plan: IV/PO Opioids PRN  Induction:  IV  Informed Consent: Informed consent signed with the Patient and all parties understand the risks and agree with anesthesia plan.  All questions  answered.   ASA Score: 2  Day of Surgery Review of History & Physical: H&P Update referred to the surgeon/provider.    Ready For Surgery From Anesthesia Perspective.     .

## 2023-01-26 ENCOUNTER — ANESTHESIA (OUTPATIENT)
Dept: ENDOSCOPY | Facility: HOSPITAL | Age: 63
End: 2023-01-26
Payer: COMMERCIAL

## 2023-01-26 ENCOUNTER — HOSPITAL ENCOUNTER (OUTPATIENT)
Facility: HOSPITAL | Age: 63
Discharge: HOME | End: 2023-01-26
Attending: INTERNAL MEDICINE | Admitting: INTERNAL MEDICINE
Payer: COMMERCIAL

## 2023-01-26 DIAGNOSIS — Z86.010 PERSONAL HISTORY OF COLONIC POLYPS: ICD-10-CM

## 2023-01-26 DIAGNOSIS — D12.5 ADENOMATOUS POLYP OF SIGMOID COLON: ICD-10-CM

## 2023-01-26 DIAGNOSIS — D12.8 ADENOMATOUS RECTAL POLYP: Primary | ICD-10-CM

## 2023-01-26 PROCEDURE — 37000008 HC ANESTHESIA 1ST 15 MINUTES: Performed by: INTERNAL MEDICINE

## 2023-01-26 PROCEDURE — 45380 COLONOSCOPY AND BIOPSY: CPT | Mod: PT,59 | Performed by: INTERNAL MEDICINE

## 2023-01-26 PROCEDURE — 63600175 PHARM REV CODE 636 W HCPCS: Performed by: NURSE ANESTHETIST, CERTIFIED REGISTERED

## 2023-01-26 PROCEDURE — 25000003 PHARM REV CODE 250: Performed by: NURSE ANESTHETIST, CERTIFIED REGISTERED

## 2023-01-26 PROCEDURE — 37000009 HC ANESTHESIA EA ADD 15 MINS: Performed by: INTERNAL MEDICINE

## 2023-01-26 PROCEDURE — 45380 PR COLONOSCOPY,BIOPSY: ICD-10-PCS | Mod: 33,59,, | Performed by: INTERNAL MEDICINE

## 2023-01-26 PROCEDURE — 45385 COLONOSCOPY W/LESION REMOVAL: CPT | Mod: PT | Performed by: INTERNAL MEDICINE

## 2023-01-26 PROCEDURE — 88305 TISSUE EXAM BY PATHOLOGIST: CPT | Performed by: INTERNAL MEDICINE

## 2023-01-26 PROCEDURE — 27201423 OPTIME MED/SURG SUP & DEVICES STERILE SUPPLY: Performed by: INTERNAL MEDICINE

## 2023-01-26 PROCEDURE — 63600175 PHARM REV CODE 636 W HCPCS: Performed by: ANESTHESIOLOGY

## 2023-01-26 PROCEDURE — 45385 COLONOSCOPY W/LESION REMOVAL: CPT | Mod: 33,,, | Performed by: INTERNAL MEDICINE

## 2023-01-26 PROCEDURE — 45380 COLONOSCOPY AND BIOPSY: CPT | Mod: 33,59,, | Performed by: INTERNAL MEDICINE

## 2023-01-26 PROCEDURE — 45385 PR COLONOSCOPY,REMV LESN,SNARE: ICD-10-PCS | Mod: 33,,, | Performed by: INTERNAL MEDICINE

## 2023-01-26 RX ORDER — SODIUM CHLORIDE, SODIUM LACTATE, POTASSIUM CHLORIDE, CALCIUM CHLORIDE 600; 310; 30; 20 MG/100ML; MG/100ML; MG/100ML; MG/100ML
INJECTION, SOLUTION INTRAVENOUS CONTINUOUS
Status: DISCONTINUED | OUTPATIENT
Start: 2023-01-26 | End: 2023-01-26 | Stop reason: HOSPADM

## 2023-01-26 RX ORDER — LIDOCAINE HYDROCHLORIDE 10 MG/ML
1 INJECTION, SOLUTION EPIDURAL; INFILTRATION; INTRACAUDAL; PERINEURAL ONCE
Status: DISCONTINUED | OUTPATIENT
Start: 2023-01-26 | End: 2023-01-26 | Stop reason: HOSPADM

## 2023-01-26 RX ORDER — PROPOFOL 10 MG/ML
VIAL (ML) INTRAVENOUS
Status: DISCONTINUED | OUTPATIENT
Start: 2023-01-26 | End: 2023-01-26

## 2023-01-26 RX ORDER — LIDOCAINE HYDROCHLORIDE 20 MG/ML
INJECTION INTRAVENOUS
Status: DISCONTINUED | OUTPATIENT
Start: 2023-01-26 | End: 2023-01-26

## 2023-01-26 RX ADMIN — PROPOFOL 80 MG: 10 INJECTION, EMULSION INTRAVENOUS at 07:01

## 2023-01-26 RX ADMIN — PROPOFOL 140 MCG/KG/MIN: 10 INJECTION, EMULSION INTRAVENOUS at 07:01

## 2023-01-26 RX ADMIN — SODIUM CHLORIDE, POTASSIUM CHLORIDE, SODIUM LACTATE AND CALCIUM CHLORIDE: 600; 310; 30; 20 INJECTION, SOLUTION INTRAVENOUS at 07:01

## 2023-01-26 RX ADMIN — LIDOCAINE HYDROCHLORIDE 50 MG: 20 INJECTION, SOLUTION INTRAVENOUS at 07:01

## 2023-01-26 NOTE — TRANSFER OF CARE
Anesthesia Transfer of Care Note    Patient: Laina Macias    Procedure(s) Performed: Procedure(s) (LRB):  COLONOSCOPY, WITH POLYPECTOMY USING SNARE (N/A)    Patient location: GI    Anesthesia Type: general    Transport from OR: Transported from OR on 2-3 L/min O2 by NC with adequate spontaneous ventilation    Post pain: adequate analgesia    Post assessment: no apparent anesthetic complications and tolerated procedure well    Post vital signs: stable    Level of consciousness: awake    Nausea/Vomiting: no nausea/vomiting    Complications: none    Transfer of care protocol was followed

## 2023-01-26 NOTE — ANESTHESIA POSTPROCEDURE EVALUATION
Anesthesia Post Evaluation    Patient: Laina Macias    Procedure(s) Performed: Procedure(s) (LRB):  COLONOSCOPY, WITH POLYPECTOMY USING SNARE (N/A)    Final Anesthesia Type: general      Patient location during evaluation: GI PACU  Patient participation: No - Unable to Participate, Coma/Other Inability to Communicate  Level of consciousness: awake and alert  Post-procedure vital signs: reviewed and stable  Pain management: adequate  Airway patency: patent    PONV status at discharge: No PONV  Anesthetic complications: no      Cardiovascular status: stable  Respiratory status: spontaneous ventilation and unassisted  Hydration status: euvolemic  Follow-up not needed.

## 2023-01-26 NOTE — PROVATION PATIENT INSTRUCTIONS
Discharge Summary/Instructions after an Endoscopic Procedure  Patient Name: Laina Macias  Patient MRN: [id number]  Patient YOB: 1960 Thursday, January 26, 2023  Herbert Hurtado MD  Dear patient,  As a result of recent federal legislation (The Federal Cures Act), you may   receive lab or pathology results from your procedure in your [facility]   account before your physician is able to contact you. Your physician or   their representative will relay the results to you with their   recommendations at their soonest availability.  Thank you,  RESTRICTIONS:  During your procedure today, you received medications for sedation.  These   medications may affect your judgment, balance and coordination.  Therefore,   for 24 hours, you have the following restrictions:   - DO NOT drive a car, operate machinery, make legal/financial decisions,   sign important papers or drink alcohol.    ACTIVITY:  Today: no heavy lifting, straining or running due to procedural   sedation/anesthesia.  The following day: return to full activity including work.  DIET:  Eat and drink normally unless instructed otherwise.     TREATMENT FOR COMMON SIDE EFFECTS:  - Mild abdominal pain, nausea, belching, bloating or excessive gas:  rest,   eat lightly and use a heating pad.  - Sore Throat: treat with throat lozenges and/or gargle with warm salt   water.  - Because air was used during the procedure, expelling large amounts of air   from your rectum or belching is normal.  - If a bowel prep was taken, you may not have a bowel movement for 1-3 days.    This is normal.  SYMPTOMS TO WATCH FOR AND REPORT TO YOUR PHYSICIAN:  1. Abdominal pain or bloating, other than gas cramps.  2. Chest pain.  3. Back pain.  4. Signs of infection such as: chills or fever occurring within 24 hours   after the procedure.  5. Rectal bleeding, which would show as bright red, maroon, or black stools.   (A tablespoon of blood from the rectum is not serious,  especially if   hemorrhoids are present.)  6. Vomiting.  7. Weakness or dizziness.  GO DIRECTLY TO THE NEAREST EMERGENCY ROOM IF YOU HAVE ANY OF THE FOLLOWING:      Difficulty breathing              Chills and/or fever over 101 F   Persistent vomiting and/or vomiting blood   Severe abdominal pain   Severe chest pain   Black, tarry stools   Bleeding- more than one tablespoon   Any other symptom or condition that you feel may need urgent attention  Your doctor recommends these additional instructions:  If any biopsies were taken, your doctors clinic will contact you in 1 to 2   weeks with any results.  - Discharge patient to home (ambulatory).   - Resume previous diet today.   - Repeat colonoscopy in 3 years for surveillance.  For questions, problems or results please call your physician - Herbert Hurtado MD at .  [facility] , EMERGENCY ROOM PHONE NUMBER: [phone]  IF A COMPLICATION OR EMERGENCY SITUATION ARISES AND YOU ARE UNABLE TO REACH   YOUR PHYSICIAN - GO DIRECTLY TO THE EMERGENCY ROOM.  MD Herbert Pratt MD  1/26/2023 9:10:04 AM  This report has been verified and signed electronically.  Dear patient,  As a result of recent federal legislation (The Federal Cures Act), you may   receive lab or pathology results from your procedure in your [facility]   account before your physician is able to contact you. Your physician or   their representative will relay the results to you with their   recommendations at their soonest availability.  Thank you,  PROVATION

## 2023-01-26 NOTE — DISCHARGE SUMMARY
[facility] - Endoscopy  Discharge Note  Short Stay    Procedure(s) (LRB):  COLONOSCOPY, WITH POLYPECTOMY USING SNARE (N/A)  Once consent was obtained the patient was transferred to the endoscopy suite.  General IV anesthesia was provided by anesthesia Services.  Rectal exam was unremarkable.  The Olympus video colonoscope was introduced per rectum and advanced around the colon to the cecum, the ileocecal valve and appendiceal orifice were identified and normal.  There were scattered small diverticula present in the ascending colon and numerous diverticula in the distal descending and sigmoid colon.  The ascending, transverse and descending colon were otherwise normal.  In the mid sigmoid colon a 1 cm sessile polyp was identified and removed with hot snare.  Several cm below this area tattooing was identified and there appeared to be some residual polypoid material left from previous polypectomy that was removed with a combination of cold snare and cold biopsies.  In the rectum a diminutive polyp was removed with cold biopsy.  The scope was withdrawn.  Withdrawal time cecum to rectum 34 minutes.  The procedure was well tolerated and the patient returned to the recovery area for observation.      Discharge plan-patient will resume her normal diet today and normal activities tomorrow.  She was told to notify us immediately she has any postprocedure pain, fever or bleeding.  Assuming all of her pathology is benign a follow-up colonoscopy in 3 years is recommended.    OUTCOME: Patient tolerated treatment/procedure well without complication and is now ready for discharge.    DISPOSITION: Home or Self Care    FINAL DIAGNOSIS:  <principal problem not specified>    FOLLOWUP: With primary care provider    DISCHARGE INSTRUCTIONS:    Discharge Procedure Orders   Diet general     Call MD for:  temperature >100.4     Call MD for:  persistent nausea and vomiting     Call MD for:  severe uncontrolled pain     Call MD for:   difficulty breathing, headache or visual disturbances     Activity as tolerated         Clinical Reference Documents Added to Patient Instructions         Document    COLONOSCOPY DISCHARGE INSTRUCTIONS (ENGLISH)            TIME SPENT ON DISCHARGE: 10 minutes

## 2023-01-26 NOTE — H&P
Colonoscopy History and Physical    Patient Name: Laina Macias  MRN: [id number]  : 1960  Date of Procedure:  2023  Referring Physician: Herbert Hurtado MD  Primary Physician: ROSA Callahan  Procedure Physician: Herbert Hurtado MD    Procedure - Colonoscopy  ASA - per anesthesia  Mallampati - per anesthesia  History of Anesthesia problems - no  Family history Anesthesia problems -  no   Plan of anesthesia - General    Diagnosis: previous adenomatous polyp  Chief Complaint: Same as above    HPI: Patient is an 62 y.o. female is here for the above. She previously had a colonoscopy in 2019 that was positive for polyps x5. She denies a family history of colon cancer. She reports regular bowel movements, denies changes in her bowel movements, denies bloody stool, and her weight has been stable.     Last colonoscopy: 2019   Family history:   Family History   Problem Relation Age of Onset    Diabetes type II Mother     Hypertension Mother      Anticoagulation: Denies    ROS:  Constitutional: No fevers, chills, No weight loss  CV: No chest pain  Pulm: No cough, No shortness of breath  GI: see HPI    Medical History:   Past Medical History:   Diagnosis Date    Anemia     Hypertension     Personal history of colonic polyps          Surgical History:   Past Surgical History:   Procedure Laterality Date     SECTION      COLONOSCOPY  10/21/2019    ectopic pregnancy N/A        Family History:   Family History   Problem Relation Age of Onset    Diabetes type II Mother     Hypertension Mother    .    Social History:   Social History     Socioeconomic History    Marital status: Single   Tobacco Use    Smoking status: Never    Smokeless tobacco: Never   Substance and Sexual Activity    Alcohol use: Yes     Comment: holidays    Drug use: Never    Sexual activity: Not Currently     Partners: Male       Review of patient's allergies indicates:  No Known Allergies    Medications:   Medications Prior to  Admission   Medication Sig Dispense Refill Last Dose    amLODIPine (NORVASC) 10 MG tablet Take 1 tablet (10 mg total) by mouth once daily. 90 tablet 1 1/26/2023 at 0630    atorvastatin (LIPITOR) 10 MG tablet Take 1 tablet (10 mg total) by mouth every evening. 90 tablet 1 1/25/2023    hydroCHLOROthiazide (HYDRODIURIL) 25 MG tablet See Instructions, TAKE ONE TABLET BY MOUTH EVERY DAY, # 90 tab(s), 1 Refill(s), Pharmacy: [id number] [id number], 162, cm, Height/Length Dosing, 08/24/21 7:52:00 CDT, 94.8, kg, Weight Dosing, 08/24/21 7:52:00 CDT 90 tablet 1 1/25/2023    lisinopriL 10 MG tablet Take 1 tablet (10 mg total) by mouth once daily. 90 tablet 1 1/25/2023    meloxicam (MOBIC) 7.5 MG tablet Take 1 tablet (7.5 mg total) by mouth 2 (two) times daily as needed for Pain. 60 tablet 3 1/25/2023    potassium chloride (K-TAB) 20 mEq TAKE ONE TABLET BY MOUTH EVERY DAY 90 tablet 3 1/25/2023    tiZANidine (ZANAFLEX) 4 MG tablet Take 1 tablet (4 mg total) by mouth every 6 (six) hours as needed (muscle spasm). 90 tablet 1 1/25/2023         Physical Exam:    Vital Signs: There were no vitals filed for this visit.  Breastfeeding No     Physical Exam  Constitutional:       Appearance: Normal appearance. She is obese.   HENT:      Mouth/Throat:      Mouth: Mucous membranes are dry.   Cardiovascular:      Rate and Rhythm: Normal rate.   Pulmonary:      Effort: Pulmonary effort is normal.   Abdominal:      Palpations: Abdomen is soft.   Skin:     General: Skin is warm and dry.   Neurological:      Mental Status: She is alert and oriented to person, place, and time.   Psychiatric:         Behavior: Behavior normal.             Labs:  Lab Results   Component Value Date    WBC 7.3 01/13/2023    HGB 14.1 01/13/2023    HCT 40.6 01/13/2023    MCV 80.7 01/13/2023     01/13/2023     No results found for: INR, PT, APTT  Lab Results   Component Value Date     01/13/2023    K 3.4 (L) 01/13/2023    CO2 28 01/13/2023    BUN 14.6  01/13/2023    CREATININE 0.81 01/13/2023    LABPROT 7.4 05/24/2022    ALBUMIN 3.5 05/24/2022    BILITOT 0.4 05/24/2022    ALKPHOS 47 05/24/2022    ALT 12 05/24/2022    AST 16 05/24/2022         Assessment and Plan:    History reviewed, vital signs satisfactory, cardiopulmonary status satisfactory.  I have explained the sedation options, risks, benefits, and alternatives of this endoscopic procedure to the patient including but not limited to bleeding, inflammation, infection, perforation, and death.  All questions were answered and the patient consented to proceed with procedure as planned.   The patient is deemed an appropriate candidate for the sedation as planned.      Francisca Keller MD  [employer] General Surgery, PGY2      1/26/2023  7:29 AM

## 2023-01-27 VITALS
BODY MASS INDEX: 38.01 KG/M2 | DIASTOLIC BLOOD PRESSURE: 72 MMHG | HEART RATE: 70 BPM | HEIGHT: 62 IN | OXYGEN SATURATION: 98 % | TEMPERATURE: 98 F | RESPIRATION RATE: 18 BRPM | SYSTOLIC BLOOD PRESSURE: 130 MMHG | WEIGHT: 206.56 LBS

## 2023-01-27 LAB
ESTROGEN SERPL-MCNC: NORMAL PG/ML
INSULIN SERPL-ACNC: NORMAL U[IU]/ML
LAB AP CLINICAL INFORMATION: NORMAL
LAB AP GROSS DESCRIPTION: NORMAL
LAB AP REPORT FOOTNOTES: NORMAL
T3RU NFR SERPL: NORMAL %

## 2023-02-02 ENCOUNTER — TELEPHONE (OUTPATIENT)
Dept: ENDOSCOPY | Facility: HOSPITAL | Age: 63
End: 2023-02-02
Payer: COMMERCIAL

## 2023-02-02 NOTE — TELEPHONE ENCOUNTER
----- Message from Herbert Hurtado MD sent at 2/2/2023  7:02 AM CST -----  Please let patient know that polyps removed were adenoma polyps.   These types of polyps are not cancer, but they are pre-cancerous (meaning that they can turn into cancers). Removing the polyp removes the risk of it becoming cancer.  Repeat colonoscopy is recommended in 5 years.

## 2023-03-17 ENCOUNTER — PATIENT OUTREACH (OUTPATIENT)
Dept: ADMINISTRATIVE | Facility: HOSPITAL | Age: 63
End: 2023-03-17
Payer: COMMERCIAL

## 2023-03-17 NOTE — PROGRESS NOTES
Population Health Outreach. The following record(s) were uploaded for Health Maintenance.    Pap Smear /HPV 6.1.2020

## 2023-04-17 ENCOUNTER — APPOINTMENT (OUTPATIENT)
Dept: LAB | Facility: HOSPITAL | Age: 63
End: 2023-04-17
Attending: NURSE PRACTITIONER
Payer: COMMERCIAL

## 2023-04-18 ENCOUNTER — OFFICE VISIT (OUTPATIENT)
Dept: INTERNAL MEDICINE | Facility: CLINIC | Age: 63
End: 2023-04-18
Payer: COMMERCIAL

## 2023-04-18 VITALS
WEIGHT: 208 LBS | HEIGHT: 62 IN | TEMPERATURE: 98 F | BODY MASS INDEX: 38.28 KG/M2 | HEART RATE: 78 BPM | DIASTOLIC BLOOD PRESSURE: 81 MMHG | RESPIRATION RATE: 18 BRPM | SYSTOLIC BLOOD PRESSURE: 135 MMHG

## 2023-04-18 DIAGNOSIS — R19.5 POSITIVE FIT (FECAL IMMUNOCHEMICAL TEST): ICD-10-CM

## 2023-04-18 DIAGNOSIS — Z00.00 WELL ADULT EXAM: ICD-10-CM

## 2023-04-18 DIAGNOSIS — I10 PRIMARY HYPERTENSION: Primary | ICD-10-CM

## 2023-04-18 DIAGNOSIS — E66.01 SEVERE OBESITY (BMI 35.0-39.9) WITH COMORBIDITY: ICD-10-CM

## 2023-04-18 DIAGNOSIS — E87.6 HYPOKALEMIA: ICD-10-CM

## 2023-04-18 DIAGNOSIS — E78.1 PURE HYPERTRIGLYCERIDEMIA: ICD-10-CM

## 2023-04-18 DIAGNOSIS — D12.5 ADENOMATOUS POLYP OF SIGMOID COLON: ICD-10-CM

## 2023-04-18 DIAGNOSIS — R31.9 HEMATURIA, UNSPECIFIED TYPE: ICD-10-CM

## 2023-04-18 PROCEDURE — 1159F MED LIST DOCD IN RCRD: CPT | Mod: CPTII,,, | Performed by: NURSE PRACTITIONER

## 2023-04-18 PROCEDURE — 4010F PR ACE/ARB THEARPY RXD/TAKEN: ICD-10-PCS | Mod: CPTII,,, | Performed by: NURSE PRACTITIONER

## 2023-04-18 PROCEDURE — 99214 PR OFFICE/OUTPT VISIT, EST, LEVL IV, 30-39 MIN: ICD-10-PCS | Mod: S$PBB,,, | Performed by: NURSE PRACTITIONER

## 2023-04-18 PROCEDURE — 1159F PR MEDICATION LIST DOCUMENTED IN MEDICAL RECORD: ICD-10-PCS | Mod: CPTII,,, | Performed by: NURSE PRACTITIONER

## 2023-04-18 PROCEDURE — 99214 OFFICE O/P EST MOD 30 MIN: CPT | Mod: S$PBB,,, | Performed by: NURSE PRACTITIONER

## 2023-04-18 PROCEDURE — 3079F DIAST BP 80-89 MM HG: CPT | Mod: CPTII,,, | Performed by: NURSE PRACTITIONER

## 2023-04-18 PROCEDURE — 99213 OFFICE O/P EST LOW 20 MIN: CPT | Mod: PBBFAC | Performed by: NURSE PRACTITIONER

## 2023-04-18 PROCEDURE — 3008F BODY MASS INDEX DOCD: CPT | Mod: CPTII,,, | Performed by: NURSE PRACTITIONER

## 2023-04-18 PROCEDURE — 3075F SYST BP GE 130 - 139MM HG: CPT | Mod: CPTII,,, | Performed by: NURSE PRACTITIONER

## 2023-04-18 PROCEDURE — 3008F PR BODY MASS INDEX (BMI) DOCUMENTED: ICD-10-PCS | Mod: CPTII,,, | Performed by: NURSE PRACTITIONER

## 2023-04-18 PROCEDURE — 3075F PR MOST RECENT SYSTOLIC BLOOD PRESS GE 130-139MM HG: ICD-10-PCS | Mod: CPTII,,, | Performed by: NURSE PRACTITIONER

## 2023-04-18 PROCEDURE — 4010F ACE/ARB THERAPY RXD/TAKEN: CPT | Mod: CPTII,,, | Performed by: NURSE PRACTITIONER

## 2023-04-18 PROCEDURE — 1160F RVW MEDS BY RX/DR IN RCRD: CPT | Mod: CPTII,,, | Performed by: NURSE PRACTITIONER

## 2023-04-18 PROCEDURE — 1160F PR REVIEW ALL MEDS BY PRESCRIBER/CLIN PHARMACIST DOCUMENTED: ICD-10-PCS | Mod: CPTII,,, | Performed by: NURSE PRACTITIONER

## 2023-04-18 PROCEDURE — 3079F PR MOST RECENT DIASTOLIC BLOOD PRESSURE 80-89 MM HG: ICD-10-PCS | Mod: CPTII,,, | Performed by: NURSE PRACTITIONER

## 2023-04-18 RX ORDER — HYDROCHLOROTHIAZIDE 25 MG/1
TABLET ORAL
Qty: 90 TABLET | Refills: 1 | Status: SHIPPED | OUTPATIENT
Start: 2023-04-18 | End: 2023-11-15

## 2023-04-18 RX ORDER — ATORVASTATIN CALCIUM 10 MG/1
10 TABLET, FILM COATED ORAL NIGHTLY
Qty: 90 TABLET | Refills: 1 | Status: SHIPPED | OUTPATIENT
Start: 2023-04-18 | End: 2023-11-17 | Stop reason: SDUPTHER

## 2023-04-18 RX ORDER — LISINOPRIL 10 MG/1
10 TABLET ORAL DAILY
Qty: 90 TABLET | Refills: 1 | Status: SHIPPED | OUTPATIENT
Start: 2023-04-18 | End: 2023-11-17 | Stop reason: SDUPTHER

## 2023-04-18 RX ORDER — AMLODIPINE BESYLATE 10 MG/1
10 TABLET ORAL DAILY
Qty: 90 TABLET | Refills: 1 | Status: SHIPPED | OUTPATIENT
Start: 2023-04-18 | End: 2023-11-17 | Stop reason: SDUPTHER

## 2023-04-18 NOTE — PROGRESS NOTES
Patient ID: [id number]     Chief Complaint: LAB RESULTS        HPI:     HPI      Laina Macias is a 62 y.o. female here today for a follow up.         ----------------------------  Anemia  Diverticulosis  Hypertension  Personal history of colonic polyps     Past Surgical History:   Procedure Laterality Date     SECTION      COLONOSCOPY  10/21/2019    COLONOSCOPY, WITH POLYPECTOMY USING SNARE N/A 2023    Procedure: COLONOSCOPY, WITH POLYPECTOMY USING SNARE;  Surgeon: Herbert Hurtado MD;  Location: [facility] ENDOSCOPY;  Service: Endoscopy;  Laterality: N/A;    ectopic pregnancy N/A        Review of patient's allergies indicates:  No Known Allergies    Current Outpatient Medications   Medication Instructions    amLODIPine (NORVASC) 10 mg, Oral, Daily    atorvastatin (LIPITOR) 10 mg, Oral, Nightly    hydroCHLOROthiazide (HYDRODIURIL) 25 MG tablet See Instructions, TAKE ONE TABLET BY MOUTH EVERY DAY, # 90 tab(s), 1 Refill(s), Pharmacy: [place] [facility] [address] [id number], 162, cm, Height/Length Dosing, 21 7:52:00 CDT, 94.8, kg, Weight Dosing, 21 7:52:00 CDT    lisinopriL 10 mg, Oral, Daily    meloxicam (MOBIC) 7.5 mg, Oral, 2 times daily PRN    potassium chloride (K-TAB) 20 mEq TAKE ONE TABLET BY MOUTH EVERY DAY    tiZANidine (ZANAFLEX) 4 mg, Oral, Every 6 hours PRN       Social History     Socioeconomic History    Marital status: Single   Tobacco Use    Smoking status: Never    Smokeless tobacco: Never   Substance and Sexual Activity    Alcohol use: Yes     Comment: holidays    Drug use: Never    Sexual activity: Not Currently     Partners: Male        Family History   Problem Relation Age of Onset    Diabetes type II Mother     Hypertension Mother         Patient Care Team:  ROSA Fortune as PCP - General (Family Medicine)  Stephanie Perry LPN as Care Coordinator     Subjective:     Review of Systems     See HPI for details    Constitutional: Denies Change in appetite. Denies Chills. Denies  "Fever. Denies Night sweats.  Eye: Denies Blurred vision. Denies Discharge. Denies Eye pain.  ENT: Denies Decreased hearing. Denies Sore throat. Denies Swollen glands.  Respiratory: Denies Cough. Denies Shortness of breath. Denies Shortness of breath with exertion. Denies Wheezing.  Cardiovascular: DeniesChest pain at rest. Denies Chest pain with exertion. Denies Irregular heartbeat. Denies Palpitations. Denies Edema.  Gastrointestinal: Denies Abdominal pain. DeniesDiarrhea. Denies Nausea. Denies Vomiting. Denies Hematemesis or Hematochezia.  Genitourinary: Denies Dysuria. Denies Urinary frequency. Denies Urinary urgency. Denies Blood in urine.  Endocrine: Denies Cold intolerance. Denies Excessive thirst. Denies Heat intolerance. Denies Weight loss. Denies Weight gain.  Musculoskeletal: Denies Painful joints. Denies Weakness.  Integumentary: Denies Rash. Denies Itching. Denies Dry skin.  Neurologic: Denies Dizziness. Denies Fainting. Denies Headache.  Psychiatric: Denies Depression. Denies Anxiety. Denies Suicidal/Homicidal ideations.    All Other ROS: Negative except as stated in HPI.       Objective:     Visit Vitals  /81 (BP Location: Right arm, Patient Position: Sitting, BP Method: Medium (Automatic))   Pulse 78   Temp 98 °F (36.7 °C) (Oral)   Resp 18   Ht 5' 2" (1.575 m)   Wt 94.3 kg (208 lb)   BMI 38.04 kg/m²       Physical Exam    General: Alert and oriented, No acute distress.  Head: Normocephalic, Atraumatic.  Eye: Pupils are equal, round and reactive to light, Extraocular movements are intact, Sclera non-icteric.  Ears/Nose/Throat: Normal, Mucosa moist,Clear.  Neck/Thyroid: Supple, Non-tender, No carotid bruit, No lymphadenopathy, No JVD, Full range of motion.  Respiratory: Clear to auscultation bilaterally; No wheezes, rales or rhonchi,Non-labored respirations, Symmetrical chest wall expansion.  Cardiovascular: Regular rate and rhythm, S1/S2 normal, No murmurs, rubs or gallops.  Gastrointestinal: " Soft, Non-tender, Non-distended, Normal bowel sounds, No palpable organomegaly.  Musculoskeletal: Normal range of motion.  Integumentary: Warm, Dry, Intact, No suspicious lesions or rashes.  Extremities: No clubbing, cyanosis or edema  Neurologic: No focal deficits, Cranial Nerves II-XII are grossly intact, Motor strength normal upper and lower extremities, Sensory exam intact.  Psychiatric: Normal interaction, Coherent speech, Euthymic mood, Appropriate affect       Labs Reviewed:     Chemistry:  Lab Results   Component Value Date     04/17/2023    K 3.9 04/17/2023    CHLORIDE 105 04/17/2023    BUN 18.6 04/17/2023    CREATININE 0.85 04/17/2023    EGFRNORACEVR >60 04/17/2023    GLUCOSE 111 04/17/2023    CALCIUM 9.8 04/17/2023    ALKPHOS 51 04/17/2023    LABPROT 7.7 (H) 04/17/2023    ALBUMIN 3.7 04/17/2023    BILIDIR 0.1 05/08/2021    IBILI 0.30 05/08/2021    AST 21 04/17/2023    ALT 20 04/17/2023    [id number] 39.3 05/08/2021        Lab Results   Component Value Date    HGBA1C 5.6 01/13/2023        Hematology:  Lab Results   Component Value Date    WBC 7.3 01/13/2023    HGB 14.1 01/13/2023    HCT 40.6 01/13/2023     01/13/2023       Lipid Panel:  Lab Results   Component Value Date    CHOL 151 04/17/2023    HDL 36 04/17/2023    LDL 56.00 04/17/2023    TRIG 294 (H) 04/17/2023    TOTALCHOLEST 4 04/17/2023        Urine:  Lab Results   Component Value Date    COLORUA Light-Yellow 04/17/2023    APPEARANCEUA Clear 04/17/2023    SGUA 1.016 04/17/2023    PHUA 6.0 04/17/2023    PROTEINUA 2+ (A) 04/17/2023    GLUCOSEUA Normal 04/17/2023    KETONESUA Negative 04/17/2023    BLOODUA 2+ (A) 04/17/2023    NITRITESUA Negative 04/17/2023    LEUKOCYTESUR Negative 04/17/2023    RBCUA 6-10 (A) 04/17/2023    WBCUA 0-5 04/17/2023    BACTERIA None Seen 04/17/2023    SQEPUA Trace (A) 04/17/2023    HYALINECASTS None Seen 04/17/2023        Assessment:       ICD-10-CM ICD-9-CM   1. Primary hypertension  I10 401.9   2. Pure  hypertriglyceridemia  E78.1 272.1   3. Hypokalemia  E87.6 276.8   4. Positive FIT (fecal immunochemical test)  R19.5 792.1   5. Well adult exam  Z00.00 V70.0   6. Severe obesity (BMI 35.0-39.9) with comorbidity  E66.01 278.01   7. Adenomatous polyp of sigmoid colon  D12.5 211.3   8. Hematuria, unspecified type  R31.9 599.70        Plan:     1. Primary hypertension  Low fat low salt diet and exercise. Cont Lisinopril, HCTZ, Amlodipine as prescribed.     2. Pure hypertriglyceridemia  Trigs 294 up from 163. Low fat diet and exercise.  Cont Atorvastatin as prescribed.     3. Hypokalemia  Resolved. Will monitor.     4. Positive FIT (fecal immunochemical test)  UTD colonoscopy done 1-26-23 polyps X 4 no malignancy- repeat in 3 years (1/2026).    5. Well adult exam  Labs in 3 months. UTD MMG. UTD GYN. UTD colonoscopy done 1-26-23 polyps X 4 no malignancy- repeat in 3 years (1/2026).    6. Severe obesity (BMI 35.0-39.9) with comorbidity  Low fat diet and exercise encouraged. Education provided.     7. Adenomatous polyp of sigmoid colon  UTD colonoscopy done 1-26-23 polyps X 4 no malignancy- repeat in 3 years (1/2026).    8. Hematuria, unspecified type  UA C&S cyto in 3 months.          Follow up in about 3 months (around 7/18/2023) for with labs 1 week prior to appt. In addition to their scheduled follow up, the patient has also been instructed to follow up on as needed basis.     Future Appointments   Date Time Provider Department Center   1/19/2024  8:10 AM Lilia Medrano, [facility] GYN ROSA Will

## 2023-07-14 ENCOUNTER — LAB VISIT (OUTPATIENT)
Dept: LAB | Facility: HOSPITAL | Age: 63
End: 2023-07-14
Attending: NURSE PRACTITIONER
Payer: COMMERCIAL

## 2023-07-14 DIAGNOSIS — R31.9 HEMATURIA, UNSPECIFIED TYPE: ICD-10-CM

## 2023-07-14 DIAGNOSIS — I10 PRIMARY HYPERTENSION: ICD-10-CM

## 2023-07-14 LAB
ANION GAP SERPL CALC-SCNC: 9 MEQ/L
APPEARANCE UR: CLEAR
BACTERIA #/AREA URNS AUTO: ABNORMAL /HPF
BILIRUB UR QL STRIP.AUTO: NEGATIVE
BUN SERPL-MCNC: 11.5 MG/DL (ref 9.8–20.1)
CALCIUM SERPL-MCNC: 9.6 MG/DL (ref 8.4–10.2)
CHLORIDE SERPL-SCNC: 106 MMOL/L (ref 98–107)
CO2 SERPL-SCNC: 26 MMOL/L (ref 23–31)
COLOR UR: ABNORMAL
CREAT SERPL-MCNC: 0.83 MG/DL (ref 0.55–1.02)
CREAT/UREA NIT SERPL: 14
GFR SERPLBLD CREATININE-BSD FMLA CKD-EPI: >60 MLS/MIN/1.73/M2
GLUCOSE SERPL-MCNC: 111 MG/DL (ref 82–115)
GLUCOSE UR QL STRIP.AUTO: NORMAL
HYALINE CASTS #/AREA URNS LPF: ABNORMAL /LPF
KETONES UR QL STRIP.AUTO: NEGATIVE
LEUKOCYTE ESTERASE UR QL STRIP.AUTO: NEGATIVE
MUCOUS THREADS URNS QL MICRO: ABNORMAL /LPF
NITRITE UR QL STRIP.AUTO: NEGATIVE
PH UR STRIP.AUTO: 6 [PH]
POTASSIUM SERPL-SCNC: 3.5 MMOL/L (ref 3.5–5.1)
PROT UR QL STRIP.AUTO: ABNORMAL
RBC #/AREA URNS AUTO: ABNORMAL /HPF
RBC UR QL AUTO: ABNORMAL
SODIUM SERPL-SCNC: 141 MMOL/L (ref 136–145)
SP GR UR STRIP.AUTO: 1.01
SQUAMOUS #/AREA URNS LPF: ABNORMAL /HPF
UROBILINOGEN UR STRIP-ACNC: NORMAL
WBC #/AREA URNS AUTO: ABNORMAL /HPF

## 2023-07-14 PROCEDURE — 80048 BASIC METABOLIC PNL TOTAL CA: CPT

## 2023-07-14 PROCEDURE — 88108 CYTOPATH CONCENTRATE TECH: CPT | Mod: TC

## 2023-07-14 PROCEDURE — 81001 URINALYSIS AUTO W/SCOPE: CPT

## 2023-07-14 PROCEDURE — 36415 COLL VENOUS BLD VENIPUNCTURE: CPT

## 2023-07-17 ENCOUNTER — OFFICE VISIT (OUTPATIENT)
Dept: INTERNAL MEDICINE | Facility: CLINIC | Age: 63
End: 2023-07-17
Payer: COMMERCIAL

## 2023-07-17 ENCOUNTER — TELEPHONE (OUTPATIENT)
Dept: INTERNAL MEDICINE | Facility: CLINIC | Age: 63
End: 2023-07-17

## 2023-07-17 VITALS
SYSTOLIC BLOOD PRESSURE: 130 MMHG | RESPIRATION RATE: 18 BRPM | DIASTOLIC BLOOD PRESSURE: 80 MMHG | HEART RATE: 61 BPM | WEIGHT: 207 LBS | BODY MASS INDEX: 38.09 KG/M2 | HEIGHT: 62 IN | TEMPERATURE: 98 F

## 2023-07-17 DIAGNOSIS — R31.9 HEMATURIA, UNSPECIFIED TYPE: ICD-10-CM

## 2023-07-17 DIAGNOSIS — I10 PRIMARY HYPERTENSION: Primary | ICD-10-CM

## 2023-07-17 DIAGNOSIS — Z00.00 WELL ADULT EXAM: ICD-10-CM

## 2023-07-17 DIAGNOSIS — E66.01 SEVERE OBESITY (BMI 35.0-39.9) WITH COMORBIDITY: ICD-10-CM

## 2023-07-17 DIAGNOSIS — R19.5 POSITIVE FIT (FECAL IMMUNOCHEMICAL TEST): ICD-10-CM

## 2023-07-17 DIAGNOSIS — D12.8 ADENOMATOUS RECTAL POLYP: ICD-10-CM

## 2023-07-17 DIAGNOSIS — E78.1 PURE HYPERTRIGLYCERIDEMIA: ICD-10-CM

## 2023-07-17 DIAGNOSIS — R30.0 DYSURIA: ICD-10-CM

## 2023-07-17 DIAGNOSIS — E87.6 HYPOKALEMIA: ICD-10-CM

## 2023-07-17 DIAGNOSIS — Z87.442 HISTORY OF KIDNEY STONES: ICD-10-CM

## 2023-07-17 LAB
ESTROGEN SERPL-MCNC: NORMAL PG/ML
INSULIN SERPL-ACNC: NORMAL U[IU]/ML
LAB AP CLINICAL INFORMATION: NORMAL
LAB AP GROSS DESCRIPTION: NORMAL

## 2023-07-17 PROCEDURE — 3079F DIAST BP 80-89 MM HG: CPT | Mod: CPTII,,, | Performed by: NURSE PRACTITIONER

## 2023-07-17 PROCEDURE — 3008F BODY MASS INDEX DOCD: CPT | Mod: CPTII,,, | Performed by: NURSE PRACTITIONER

## 2023-07-17 PROCEDURE — 99214 OFFICE O/P EST MOD 30 MIN: CPT | Mod: S$PBB,,, | Performed by: NURSE PRACTITIONER

## 2023-07-17 PROCEDURE — 99214 PR OFFICE/OUTPT VISIT, EST, LEVL IV, 30-39 MIN: ICD-10-PCS | Mod: S$PBB,,, | Performed by: NURSE PRACTITIONER

## 2023-07-17 PROCEDURE — 99214 OFFICE O/P EST MOD 30 MIN: CPT | Mod: PBBFAC | Performed by: NURSE PRACTITIONER

## 2023-07-17 PROCEDURE — 4010F PR ACE/ARB THEARPY RXD/TAKEN: ICD-10-PCS | Mod: CPTII,,, | Performed by: NURSE PRACTITIONER

## 2023-07-17 PROCEDURE — 1159F MED LIST DOCD IN RCRD: CPT | Mod: CPTII,,, | Performed by: NURSE PRACTITIONER

## 2023-07-17 PROCEDURE — 3075F SYST BP GE 130 - 139MM HG: CPT | Mod: CPTII,,, | Performed by: NURSE PRACTITIONER

## 2023-07-17 PROCEDURE — 3044F PR MOST RECENT HEMOGLOBIN A1C LEVEL <7.0%: ICD-10-PCS | Mod: CPTII,,, | Performed by: NURSE PRACTITIONER

## 2023-07-17 PROCEDURE — 3008F PR BODY MASS INDEX (BMI) DOCUMENTED: ICD-10-PCS | Mod: CPTII,,, | Performed by: NURSE PRACTITIONER

## 2023-07-17 PROCEDURE — 3044F HG A1C LEVEL LT 7.0%: CPT | Mod: CPTII,,, | Performed by: NURSE PRACTITIONER

## 2023-07-17 PROCEDURE — 4010F ACE/ARB THERAPY RXD/TAKEN: CPT | Mod: CPTII,,, | Performed by: NURSE PRACTITIONER

## 2023-07-17 PROCEDURE — 1159F PR MEDICATION LIST DOCUMENTED IN MEDICAL RECORD: ICD-10-PCS | Mod: CPTII,,, | Performed by: NURSE PRACTITIONER

## 2023-07-17 PROCEDURE — 3075F PR MOST RECENT SYSTOLIC BLOOD PRESS GE 130-139MM HG: ICD-10-PCS | Mod: CPTII,,, | Performed by: NURSE PRACTITIONER

## 2023-07-17 PROCEDURE — 3079F PR MOST RECENT DIASTOLIC BLOOD PRESSURE 80-89 MM HG: ICD-10-PCS | Mod: CPTII,,, | Performed by: NURSE PRACTITIONER

## 2023-07-17 RX ORDER — NITROFURANTOIN 25; 75 MG/1; MG/1
100 CAPSULE ORAL 2 TIMES DAILY
Qty: 14 CAPSULE | Refills: 0 | Status: SHIPPED | OUTPATIENT
Start: 2023-07-17 | End: 2023-07-24

## 2023-07-17 NOTE — TELEPHONE ENCOUNTER
Informed patient her urine cytology results showed atypical cells seen on urine cytology results. Patient referred to Uro clinic today during visit. Please keep Uro clinic appointment for further evaluation when scheduled. Patient verbalized understanding of all information given to her today.

## 2023-07-17 NOTE — PROGRESS NOTES
Patient ID: [id number]     Chief Complaint: LAB RESULTS        HPI:     BENJA Macias is a 62 y.o. female here today for a follow up.         ----------------------------  Anemia  Diverticulosis  Hypertension  Personal history of colonic polyps     Past Surgical History:   Procedure Laterality Date     SECTION      COLONOSCOPY  10/21/2019    COLONOSCOPY, WITH POLYPECTOMY USING SNARE N/A 2023    Procedure: COLONOSCOPY, WITH POLYPECTOMY USING SNARE;  Surgeon: Herbert Hurtado MD;  Location: [facility] ENDOSCOPY;  Service: Endoscopy;  Laterality: N/A;    ectopic pregnancy N/A        Review of patient's allergies indicates:  No Known Allergies    Current Outpatient Medications   Medication Instructions    amLODIPine (NORVASC) 10 mg, Oral, Daily    atorvastatin (LIPITOR) 10 mg, Oral, Nightly    hydroCHLOROthiazide (HYDRODIURIL) 25 MG tablet See Instructions, TAKE ONE TABLET BY MOUTH EVERY DAY, # 90 tab(s), 1 Refill(s), Pharmacy: [place] [facility] [id number] [id number], 162, cm, Height/Length Dosing, 21 7:52:00 CDT, 94.8, kg, Weight Dosing, 21 7:52:00 CDT    lisinopriL 10 mg, Oral, Daily    meloxicam (MOBIC) 7.5 mg, Oral, 2 times daily PRN    potassium chloride (K-TAB) 20 mEq TAKE ONE TABLET BY MOUTH EVERY DAY    tiZANidine (ZANAFLEX) 4 mg, Oral, Every 6 hours PRN       Social History     Socioeconomic History    Marital status: Single   Tobacco Use    Smoking status: Never    Smokeless tobacco: Never   Substance and Sexual Activity    Alcohol use: Yes     Comment: holidays    Drug use: Never    Sexual activity: Not Currently     Partners: Male     Social Determinants of Health     Financial Resource Strain: Low Risk     Difficulty of Paying Living Expenses: Not hard at all   Food Insecurity: No Food Insecurity    Worried About Running Out of Food in the Last Year: Never true    Ran Out of Food in the Last Year: Never true   Transportation Needs: No Transportation Needs    Lack of Transportation (Medical):  No    Lack of Transportation (Non-Medical): No   Physical Activity: Inactive    Days of Exercise per Week: 0 days    Minutes of Exercise per Session: 0 min   Stress: No Stress Concern Present    Feeling of Stress : Not at all   Social Connections: Moderately Isolated    Frequency of Communication with Friends and Family: More than three times a week    Frequency of Social Gatherings with Friends and Family: Once a week    Attends Rastafarian Services: More than 4 times per year    Active Member of Clubs or Organizations: No    Attends Club or Organization Meetings: Never    Marital Status: Never    Housing Stability: Low Risk     Unable to Pay for Housing in the Last Year: No    Number of Places Lived in the Last Year: 1    Unstable Housing in the Last Year: No        Family History   Problem Relation Age of Onset    Diabetes type II Mother     Hypertension Mother         Patient Care Team:  ROSA Fortune as PCP - General (Family Medicine)  Stephanie Perry LPN as Care Coordinator     Subjective:     Review of Systems     See HPI for details    Constitutional: Denies Change in appetite. Denies Chills. Denies Fever. Denies Night sweats.  Eye: Denies Blurred vision. Denies Discharge. Denies Eye pain.  ENT: Denies Decreased hearing. Denies Sore throat. Denies Swollen glands.  Respiratory: Denies Cough. Denies Shortness of breath. Denies Shortness of breath with exertion. Denies Wheezing.  Cardiovascular: DeniesChest pain at rest. Denies Chest pain with exertion. Denies Irregular heartbeat. Denies Palpitations. Denies Edema.  Gastrointestinal: Denies Abdominal pain. DeniesDiarrhea. Denies Nausea. Denies Vomiting. Denies Hematemesis or Hematochezia.  Genitourinary: Denies Dysuria. Denies Urinary frequency. Denies Urinary urgency. Denies Blood in urine.  Endocrine: Denies Cold intolerance. Denies Excessive thirst. Denies Heat intolerance. Denies Weight loss. Denies Weight gain.  Musculoskeletal: Denies  "Painful joints. Denies Weakness.  Integumentary: Denies Rash. Denies Itching. Denies Dry skin.  Neurologic: Denies Dizziness. Denies Fainting. Denies Headache.  Psychiatric: Denies Depression. Denies Anxiety. Denies Suicidal/Homicidal ideations.    All Other ROS: Negative except as stated in HPI.       Objective:     Visit Vitals  /80 (BP Location: Left arm, Patient Position: Sitting, BP Method: Large (Automatic))   Pulse 61   Temp 97.8 °F (36.6 °C) (Oral)   Resp 18   Ht 5' 2" (1.575 m)   Wt 93.9 kg (207 lb)   BMI 37.86 kg/m²       Physical Exam    General: Alert and oriented, No acute distress.  Head: Normocephalic, Atraumatic.  Eye: Pupils are equal, round and reactive to light, Extraocular movements are intact, Sclera non-icteric.  Ears/Nose/Throat: Normal, Mucosa moist,Clear.  Neck/Thyroid: Supple, Non-tender, No carotid bruit, No lymphadenopathy, No JVD, Full range of motion.  Respiratory: Clear to auscultation bilaterally; No wheezes, rales or rhonchi,Non-labored respirations, Symmetrical chest wall expansion.  Cardiovascular: Regular rate and rhythm, S1/S2 normal, No murmurs, rubs or gallops.  Gastrointestinal: Soft, Non-tender, Non-distended, Normal bowel sounds, No palpable organomegaly.  Musculoskeletal: Normal range of motion.  Integumentary: Warm, Dry, Intact, No suspicious lesions or rashes.  Extremities: No clubbing, cyanosis or edema  Neurologic: No focal deficits, Cranial Nerves II-XII are grossly intact, Motor strength normal upper and lower extremities, Sensory exam intact.  Psychiatric: Normal interaction, Coherent speech, Euthymic mood, Appropriate affect       Labs Reviewed:     Chemistry:  Lab Results   Component Value Date     07/14/2023    K 3.5 07/14/2023    CHLORIDE 106 07/14/2023    BUN 11.5 07/14/2023    CREATININE 0.83 07/14/2023    EGFRNORACEVR >60 07/14/2023    GLUCOSE 111 07/14/2023    CALCIUM 9.6 07/14/2023    ALKPHOS 51 04/17/2023    LABPROT 7.7 (H) 04/17/2023    ALBUMIN " 3.7 04/17/2023    BILIDIR 0.1 05/08/2021    IBILI 0.30 05/08/2021    AST 21 04/17/2023    ALT 20 04/17/2023    [id number] 39.3 05/08/2021        Lab Results   Component Value Date    HGBA1C 5.6 01/13/2023        Hematology:  Lab Results   Component Value Date    WBC 7.3 01/13/2023    HGB 14.1 01/13/2023    HCT 40.6 01/13/2023     01/13/2023       Lipid Panel:  Lab Results   Component Value Date    CHOL 151 04/17/2023    HDL 36 04/17/2023    LDL 56.00 04/17/2023    TRIG 294 (H) 04/17/2023    TOTALCHOLEST 4 04/17/2023        Urine:  Lab Results   Component Value Date    COLORUA Light-Yellow 07/14/2023    APPEARANCEUA Clear 07/14/2023    SGUA 1.014 07/14/2023    PHUA 6.0 07/14/2023    PROTEINUA 2+ (A) 07/14/2023    GLUCOSEUA Normal 07/14/2023    KETONESUA Negative 07/14/2023    BLOODUA 2+ (A) 07/14/2023    NITRITESUA Negative 07/14/2023    LEUKOCYTESUR Negative 07/14/2023    RBCUA 6-10 (A) 07/14/2023    WBCUA 0-5 07/14/2023    BACTERIA Trace (A) 07/14/2023    SQEPUA Trace (A) 07/14/2023    HYALINECASTS None Seen 07/14/2023        Assessment:       ICD-10-CM ICD-9-CM   1. Primary hypertension  I10 401.9   2. Pure hypertriglyceridemia  E78.1 272.1   3. Hypokalemia  E87.6 276.8   4. Positive FIT (fecal immunochemical test)  R19.5 792.1   5. Well adult exam  Z00.00 V70.0   6. Severe obesity (BMI 35.0-39.9) with comorbidity  E66.01 278.01   7. Adenomatous rectal polyp  D12.8 211.4   8. Hematuria, unspecified type  R31.9 599.70        Plan:     1. Primary hypertension  Low fat low salt diet and exercise. Cont Lisinopril, HCTZ, Amlodipine as prescribed.     2. Pure hypertriglyceridemia   Low fat diet and exercise.  Cont Atorvastatin as prescribed.     3. Hypokalemia  Resolved. Will monitor.      4. Positive FIT (fecal immunochemical test)  UTD colonoscopy done 1-26-23 polyps X 4 no malignancy- repeat in 3 years (1/2026).    5. Well adult exam  Labs in 4 months. UTD MMG. UTD GYN. UTD colonoscopy done 1-26-23 polyps X  4 no malignancy- repeat in 3 years (1/2026).    6. Severe obesity (BMI 35.0-39.9) with comorbidity  Low fat diet and exercise encouraged. Education provided.     7. Adenomatous rectal polyp  UTD colonoscopy done 1-26-23 polyps X 4 no malignancy- repeat in 3 years (1/2026).    8. Hematuria, unspecified type  Pt was last seen in Uro cl 6-10-21 after cystoscopy with RTC in 3-4 months with renal US. Refer to Uro cl for further eval and mgmt.   Pt states she has dysuria at present. Will RX Macrobid 100 mg 1 tab po BID X 7 days. Drink plenty of waster.       Follow up in about 4 months (around 11/17/2023) for with labs 1 week prior to appt. In addition to their scheduled follow up, the patient has also been instructed to follow up on as needed basis.     Future Appointments   Date Time Provider Department Center   1/19/2024  8:10 AM [facility] GYN Elian Un        ROSA Callahan

## 2023-07-17 NOTE — TELEPHONE ENCOUNTER
Call pt and inform urine cytology results show atypical cells seen on Urine cytology results. Pt was referred to URO cl today during visit. Keep Uro cl appt for further eval when scheduled.

## 2023-07-24 PROBLEM — Z00.00 WELL ADULT EXAM: Status: RESOLVED | Noted: 2022-05-23 | Resolved: 2023-07-24

## 2023-08-08 ENCOUNTER — OFFICE VISIT (OUTPATIENT)
Dept: UROLOGY | Facility: CLINIC | Age: 63
End: 2023-08-08
Payer: COMMERCIAL

## 2023-08-08 VITALS
WEIGHT: 206.56 LBS | RESPIRATION RATE: 20 BRPM | BODY MASS INDEX: 38.01 KG/M2 | HEIGHT: 62 IN | HEART RATE: 73 BPM | SYSTOLIC BLOOD PRESSURE: 120 MMHG | DIASTOLIC BLOOD PRESSURE: 68 MMHG | TEMPERATURE: 98 F | OXYGEN SATURATION: 99 %

## 2023-08-08 DIAGNOSIS — R31.9 HEMATURIA, UNSPECIFIED TYPE: ICD-10-CM

## 2023-08-08 DIAGNOSIS — Z87.442 HISTORY OF KIDNEY STONES: ICD-10-CM

## 2023-08-08 LAB
APPEARANCE UR: CLEAR
BACTERIA #/AREA URNS AUTO: ABNORMAL /HPF
BILIRUB UR QL STRIP.AUTO: NEGATIVE
COLOR UR: ABNORMAL
GLUCOSE UR QL STRIP.AUTO: NORMAL
HYALINE CASTS #/AREA URNS LPF: ABNORMAL /LPF
KETONES UR QL STRIP.AUTO: NEGATIVE
LEUKOCYTE ESTERASE UR QL STRIP.AUTO: NEGATIVE
MUCOUS THREADS URNS QL MICRO: ABNORMAL /LPF
NITRITE UR QL STRIP.AUTO: NEGATIVE
PH UR STRIP.AUTO: 6 [PH]
PROT UR QL STRIP.AUTO: ABNORMAL
RBC #/AREA URNS AUTO: ABNORMAL /HPF
RBC UR QL AUTO: ABNORMAL
SP GR UR STRIP.AUTO: 1.02
SQUAMOUS #/AREA URNS LPF: ABNORMAL /HPF
UROBILINOGEN UR STRIP-ACNC: NORMAL
WBC #/AREA URNS AUTO: ABNORMAL /HPF

## 2023-08-08 PROCEDURE — 3044F HG A1C LEVEL LT 7.0%: CPT | Mod: CPTII,,, | Performed by: NURSE PRACTITIONER

## 2023-08-08 PROCEDURE — 99215 OFFICE O/P EST HI 40 MIN: CPT | Mod: PBBFAC | Performed by: NURSE PRACTITIONER

## 2023-08-08 PROCEDURE — 3044F PR MOST RECENT HEMOGLOBIN A1C LEVEL <7.0%: ICD-10-PCS | Mod: CPTII,,, | Performed by: NURSE PRACTITIONER

## 2023-08-08 PROCEDURE — 3078F DIAST BP <80 MM HG: CPT | Mod: CPTII,,, | Performed by: NURSE PRACTITIONER

## 2023-08-08 PROCEDURE — 3078F PR MOST RECENT DIASTOLIC BLOOD PRESSURE < 80 MM HG: ICD-10-PCS | Mod: CPTII,,, | Performed by: NURSE PRACTITIONER

## 2023-08-08 PROCEDURE — 1159F PR MEDICATION LIST DOCUMENTED IN MEDICAL RECORD: ICD-10-PCS | Mod: CPTII,,, | Performed by: NURSE PRACTITIONER

## 2023-08-08 PROCEDURE — 1160F PR REVIEW ALL MEDS BY PRESCRIBER/CLIN PHARMACIST DOCUMENTED: ICD-10-PCS | Mod: CPTII,,, | Performed by: NURSE PRACTITIONER

## 2023-08-08 PROCEDURE — 3008F PR BODY MASS INDEX (BMI) DOCUMENTED: ICD-10-PCS | Mod: CPTII,,, | Performed by: NURSE PRACTITIONER

## 2023-08-08 PROCEDURE — 3008F BODY MASS INDEX DOCD: CPT | Mod: CPTII,,, | Performed by: NURSE PRACTITIONER

## 2023-08-08 PROCEDURE — 81001 URINALYSIS AUTO W/SCOPE: CPT | Performed by: NURSE PRACTITIONER

## 2023-08-08 PROCEDURE — 99214 PR OFFICE/OUTPT VISIT, EST, LEVL IV, 30-39 MIN: ICD-10-PCS | Mod: S$PBB,,, | Performed by: NURSE PRACTITIONER

## 2023-08-08 PROCEDURE — 3074F SYST BP LT 130 MM HG: CPT | Mod: CPTII,,, | Performed by: NURSE PRACTITIONER

## 2023-08-08 PROCEDURE — 1159F MED LIST DOCD IN RCRD: CPT | Mod: CPTII,,, | Performed by: NURSE PRACTITIONER

## 2023-08-08 PROCEDURE — 99214 OFFICE O/P EST MOD 30 MIN: CPT | Mod: S$PBB,,, | Performed by: NURSE PRACTITIONER

## 2023-08-08 PROCEDURE — 4010F ACE/ARB THERAPY RXD/TAKEN: CPT | Mod: CPTII,,, | Performed by: NURSE PRACTITIONER

## 2023-08-08 PROCEDURE — 3074F PR MOST RECENT SYSTOLIC BLOOD PRESSURE < 130 MM HG: ICD-10-PCS | Mod: CPTII,,, | Performed by: NURSE PRACTITIONER

## 2023-08-08 PROCEDURE — 1160F RVW MEDS BY RX/DR IN RCRD: CPT | Mod: CPTII,,, | Performed by: NURSE PRACTITIONER

## 2023-08-08 PROCEDURE — 4010F PR ACE/ARB THEARPY RXD/TAKEN: ICD-10-PCS | Mod: CPTII,,, | Performed by: NURSE PRACTITIONER

## 2023-08-08 NOTE — PROGRESS NOTES
Placed in room. Seen by Josue Pires NP. Spoke with patient. U/A collected and sent to lab. Obtain CT of abdomen and pelvis. Next available cysto.

## 2023-08-08 NOTE — PROGRESS NOTES
Chief Complaint:   Chief Complaint   Patient presents with    hematuria- kidney stones       HPI:  Patient is a 62 year old female here for hematuria and kidney stones.  Patient seen by PCP Cheyanne Rider NP with persistent microscopic hematuria for the past 6 months.  Patient also has a history of kidney stones in the past she was seen by Dr. Auguste with a renal ultrasound in 2021 that noted mild hydronephrosis right renal cysts and diminishing renal stones in the left kidney.  Today patient denies any urinary frequency, urinary urgency, dysuria, urinary retention, gross hematuria.    Allergies:  Review of patient's allergies indicates:  No Known Allergies    Medications:  Current Outpatient Medications   Medication Sig Dispense Refill    amLODIPine (NORVASC) 10 MG tablet Take 1 tablet (10 mg total) by mouth once daily. 90 tablet 1    atorvastatin (LIPITOR) 10 MG tablet Take 1 tablet (10 mg total) by mouth every evening. 90 tablet 1    hydroCHLOROthiazide (HYDRODIURIL) 25 MG tablet See Instructions, TAKE ONE TABLET BY MOUTH EVERY DAY, # 90 tab(s), 1 Refill(s), Pharmacy: [id number], 162, cm, Height/Length Dosing, 08/24/21 7:52:00 CDT, 94.8, kg, Weight Dosing, 08/24/21 7:52:00 CDT 90 tablet 1    lisinopriL 10 MG tablet Take 1 tablet (10 mg total) by mouth once daily. 90 tablet 1    meloxicam (MOBIC) 7.5 MG tablet Take 1 tablet (7.5 mg total) by mouth 2 (two) times daily as needed for Pain. 60 tablet 3    potassium chloride (K-TAB) 20 mEq TAKE ONE TABLET BY MOUTH EVERY DAY 90 tablet 3    tiZANidine (ZANAFLEX) 4 MG tablet Take 1 tablet (4 mg total) by mouth every 6 (six) hours as needed (muscle spasm). 90 tablet 1     No current facility-administered medications for this visit.       Review of Systems:  General: No fever, chills, fatigability, or weight loss.  Skin: No rashes, itching, or changes in color or texture of skin.  Chest: Denies NYE, cyanosis, wheezing, cough, and sputum production.  Abdomen:  Appetite fine. No weight loss. Denies diarrhea, abdominal pain, hematemesis, or blood in stool.  Musculoskeletal: No joint stiffness or swelling. Denies back pain.  : As above.  All other review of systems negative.    PMH:  Past Medical History:   Diagnosis Date    Anemia     Diverticulosis     Hypertension     Personal history of colonic polyps        PSH:  Past Surgical History:   Procedure Laterality Date     SECTION      COLONOSCOPY  10/21/2019    COLONOSCOPY, WITH POLYPECTOMY USING SNARE N/A 2023    Procedure: COLONOSCOPY, WITH POLYPECTOMY USING SNARE;  Surgeon: Herbert Hurtado MD;  Location: [facility] ENDOSCOPY;  Service: Endoscopy;  Laterality: N/A;    ectopic pregnancy N/A        FamHx:  Family History   Problem Relation Age of Onset    Diabetes type II Mother     Hypertension Mother        SocHx:  Social History     Socioeconomic History    Marital status: Single   Tobacco Use    Smoking status: Never     Passive exposure: Never    Smokeless tobacco: Never   Substance and Sexual Activity    Alcohol use: Yes     Comment: holidays    Drug use: Never    Sexual activity: Not Currently     Partners: Male     Social Determinants of Health     Financial Resource Strain: Low Risk  (2023)    Overall Financial Resource Strain (CARDIA)     Difficulty of Paying Living Expenses: Not hard at all   Food Insecurity: No Food Insecurity (2023)    Hunger Vital Sign     Worried About Running Out of Food in the Last Year: Never true     Ran Out of Food in the Last Year: Never true   Transportation Needs: No Transportation Needs (2023)    PRAPARE - Transportation     Lack of Transportation (Medical): No     Lack of Transportation (Non-Medical): No   Physical Activity: Inactive (2023)    Exercise Vital Sign     Days of Exercise per Week: 0 days     Minutes of Exercise per Session: 0 min   Stress: No Stress Concern Present (2023)    [employer] - Occupational  Stress Questionnaire     Feeling of Stress : Not at all   Social Connections: Moderately Isolated (7/17/2023)    Social Connection and Isolation Panel [NHANES]     Frequency of Communication with Friends and Family: More than three times a week     Frequency of Social Gatherings with Friends and Family: Once a week     Attends Anabaptist Services: More than 4 times per year     Active Member of Clubs or Organizations: No     Attends Club or Organization Meetings: Never     Marital Status: Never    Housing Stability: Low Risk  (7/17/2023)    Housing Stability Vital Sign     Unable to Pay for Housing in the Last Year: No     Number of Places Lived in the Last Year: 1     Unstable Housing in the Last Year: No       Physical Exam:  Vitals:    08/08/23 0822   BP: 120/68   Pulse: 73   Resp: 20   Temp: 98.2 °F (36.8 °C)     General: A&Ox3, no apparent distress, no deformities  Neck: No masses, normal thyroid  Lungs: CTA shannon, no use of accessory muscles  Heart: RRR, no arrhythmias  Abdomen: Soft, NT, ND, no masses, no hernias, no hepatosplenomegaly  Lymphatic: Neck and groin nodes negative  Skin: The skin is warm and dry. No jaundice.  Ext: No c/c/e.        Imaging:  Renal ultrasound in 2021 as noted above.      Impression:  1. Hematuria, unspecified type  - Ambulatory referral/consult to Urology    2. History of kidney stones  - Ambulatory referral/consult to Urology      Plan:  Instructed patient will get a CT of the abdomen pelvis with and without contrast and schedule patient for a cystoscope next available with Dr. Chacon.  Increase fluid intake, limit salt in diet, limit protein to 30%, intake adequate calcium, decrease brand, soy, Beets, Almonds, Rhubard, Buckwheat flour, baked potato, raspberry, potato chips Spinach, Miso, Tahini, sesame, Swiss Chard. Instructed patient on Avoiding bladder irritants, such as alcohol, citrus drinks or foods,salty foods, energy drinks, spicy foods, caffeine, sodas.

## 2023-08-21 ENCOUNTER — HOSPITAL ENCOUNTER (OUTPATIENT)
Dept: RADIOLOGY | Facility: HOSPITAL | Age: 63
Discharge: HOME | End: 2023-08-21
Attending: NURSE PRACTITIONER
Payer: COMMERCIAL

## 2023-08-21 DIAGNOSIS — R31.9 HEMATURIA, UNSPECIFIED TYPE: ICD-10-CM

## 2023-08-21 DIAGNOSIS — Z87.442 HISTORY OF KIDNEY STONES: ICD-10-CM

## 2023-08-21 LAB
CREAT SERPL-MCNC: 0.77 MG/DL (ref 0.55–1.02)
GFR SERPLBLD CREATININE-BSD FMLA CKD-EPI: >60 MLS/MIN/1.73/M2

## 2023-08-21 PROCEDURE — 25500020 PHARM REV CODE 255: Performed by: NURSE PRACTITIONER

## 2023-08-21 PROCEDURE — 74178 CT ABD&PLV WO CNTR FLWD CNTR: CPT | Mod: TC

## 2023-08-21 PROCEDURE — 82565 ASSAY OF CREATININE: CPT | Performed by: NURSE PRACTITIONER

## 2023-08-21 RX ADMIN — IOHEXOL 100 ML: 350 INJECTION, SOLUTION INTRAVENOUS at 10:08

## 2023-08-22 ENCOUNTER — TELEPHONE (OUTPATIENT)
Dept: UROLOGY | Facility: CLINIC | Age: 63
End: 2023-08-22
Payer: COMMERCIAL

## 2023-08-23 ENCOUNTER — TELEPHONE (OUTPATIENT)
Dept: UROLOGY | Facility: CLINIC | Age: 63
End: 2023-08-23
Payer: COMMERCIAL

## 2023-08-23 NOTE — TELEPHONE ENCOUNTER
Results of CT given the patient regarding right renal stone right renal cysts stable.  Instructed patient to follow-up with Dr. Chacon for cystoscope in October.

## 2023-10-15 RX ORDER — POTASSIUM CHLORIDE 20 MEQ/1
20 TABLET, EXTENDED RELEASE ORAL
Qty: 30 TABLET | Refills: 0 | Status: SHIPPED | OUTPATIENT
Start: 2023-10-15 | End: 2023-11-17 | Stop reason: SDUPTHER

## 2023-10-18 ENCOUNTER — PROCEDURE VISIT (OUTPATIENT)
Dept: UROLOGY | Facility: CLINIC | Age: 63
End: 2023-10-18
Payer: COMMERCIAL

## 2023-10-18 VITALS
TEMPERATURE: 98 F | OXYGEN SATURATION: 98 % | BODY MASS INDEX: 38.68 KG/M2 | HEART RATE: 64 BPM | DIASTOLIC BLOOD PRESSURE: 84 MMHG | RESPIRATION RATE: 18 BRPM | SYSTOLIC BLOOD PRESSURE: 130 MMHG | WEIGHT: 210.19 LBS | HEIGHT: 62 IN

## 2023-10-18 DIAGNOSIS — R31.21 ASYMPTOMATIC MICROSCOPIC HEMATURIA: Primary | ICD-10-CM

## 2023-10-18 DIAGNOSIS — N28.1 RENAL CYST: ICD-10-CM

## 2023-10-18 DIAGNOSIS — N20.0 RENAL CALCULUS: ICD-10-CM

## 2023-10-18 LAB
BILIRUB SERPL-MCNC: NORMAL MG/DL
BLOOD URINE, POC: NORMAL
COLOR, POC UA: YELLOW
GLUCOSE UR QL STRIP: NORMAL
KETONES UR QL STRIP: NORMAL
LEUKOCYTE ESTERASE URINE, POC: NORMAL
NITRITE, POC UA: NORMAL
PH, POC UA: 7
PROTEIN, POC: >=300
SPECIFIC GRAVITY, POC UA: >=1.03
UROBILINOGEN, POC UA: 0.2

## 2023-10-18 PROCEDURE — 81001 URINALYSIS AUTO W/SCOPE: CPT | Mod: PBBFAC | Performed by: UROLOGY

## 2023-10-18 PROCEDURE — 99213 OFFICE O/P EST LOW 20 MIN: CPT | Mod: 25,S$PBB,, | Performed by: UROLOGY

## 2023-10-18 PROCEDURE — 99213 PR OFFICE/OUTPT VISIT, EST, LEVL III, 20-29 MIN: ICD-10-PCS | Mod: 25,S$PBB,, | Performed by: UROLOGY

## 2023-10-18 PROCEDURE — 52000 CYSTOURETHROSCOPY: CPT | Mod: S$PBB,,, | Performed by: UROLOGY

## 2023-10-18 PROCEDURE — 52000 CYSTOURETHROSCOPY: CPT | Mod: PBBFAC | Performed by: UROLOGY

## 2023-10-18 PROCEDURE — 52000 PR CYSTOURETHROSCOPY: ICD-10-PCS | Mod: S$PBB,,, | Performed by: UROLOGY

## 2023-10-18 RX ORDER — CIPROFLOXACIN 500 MG/1
500 TABLET ORAL
Status: COMPLETED | OUTPATIENT
Start: 2023-10-18 | End: 2023-10-18

## 2023-10-18 RX ORDER — LIDOCAINE HYDROCHLORIDE 20 MG/ML
JELLY TOPICAL
Status: COMPLETED | OUTPATIENT
Start: 2023-10-18 | End: 2023-10-18

## 2023-10-18 RX ADMIN — LIDOCAINE HYDROCHLORIDE: 20 JELLY TOPICAL at 08:10

## 2023-10-18 RX ADMIN — CIPROFLOXACIN 500 MG: 500 TABLET ORAL at 08:10

## 2023-10-18 NOTE — PROGRESS NOTES
Pt seen by Dr. Clark; Pt consented & premedicated for procedure; Time out documentation completed; Cystoscopy performed w/no complications; Pt instructed to return to clinic in 6 months w/OSMEL w/[place] Pires; Discharge paperwork given w/pt verbalizing understanding

## 2023-10-18 NOTE — PROCEDURES
CC:  Cystoscopy    HPI:  Laina Macias is a 63 y.o. female here for cystoscopy for hematuria.  She has a history of microscopic hematuria.  This was found by her primary care and persisted for at least six months.  She also has a history of kidney stones.    Urinalysis:  Results for orders placed or performed in visit on 10/18/23   POCT URINE DIPSTICK WITH MICROSCOPE, AUTOMATED   Result Value Ref Range    Color, UA Yellow     Spec Grav UA >=1.030     pH, UA 7.0     WBC, UA neg     Nitrite, UA neg     Protein, POC >=300     Glucose, UA neg     Ketones, UA neg     Urobilinogen, UA 0.2     Bilirubin, POC neg     Blood, UA small      Microscopic: 3-4 RBC per HPF    Imaging:  CT - 21 August 2023:  There is a 4.5 mm nonobstructing calculus at the lower pole left kidney which is very rounded and may be in a diverticulum.  There are a couple of adjacent possible punctate nonobstructing calculi.  There are a couple of punctate nonobstructing calculi at the mid left kidney.   There appears to be evidence of an extrarenal pelvis on the right which is similar to the previous study.   There is mild left renal pelvicaliectasis which is mildly improved from the prior CT from 06/11/2020.    There is a 1.5 cm cyst at the lower pole right kidney.  There are additional subcentimeter bilateral renal hypodensities which are too small to adequately characterize, statistically likely reflective of cysts.    ROS:  All systems reviewed and are negative except as documented in HPI and/or Assessment and Plan.     Patient Active Problem List:     Patient Active Problem List   Diagnosis    Bilateral hand numbness    Bilateral hand pain    Cardiomegaly    Chest pain    Ganglion cyst    Heel pain, bilateral    Hematuria    HLD (hyperlipidemia)    HTN (hypertension)    Hypokalemia    Left leg pain    Severe obesity (BMI 35.0-39.9) with comorbidity    Positive FIT (fecal immunochemical test)    Prediabetes    Pulmonary nodule    Recurrent kidney  stones    Right shoulder pain    Vitamin D deficiency    Breast cancer screening by mammogram    Adenomatous polyp of sigmoid colon    Adenomatous rectal polyp        Past Medical History:  Past Medical History:   Diagnosis Date    Anemia     Diverticulosis     Hypertension     Personal history of colonic polyps         Past Surgical History:  Past Surgical History:   Procedure Laterality Date     SECTION      COLONOSCOPY  10/21/2019    COLONOSCOPY, WITH POLYPECTOMY USING SNARE N/A 2023    Procedure: COLONOSCOPY, WITH POLYPECTOMY USING SNARE;  Surgeon: Herbert Hurtado MD;  Location: [facility] ENDOSCOPY;  Service: Endoscopy;  Laterality: N/A;    ectopic pregnancy N/A         Family History:  Family History   Problem Relation Age of Onset    Diabetes type II Mother     Hypertension Mother         Social History:  Social History     Socioeconomic History    Marital status: Single   Tobacco Use    Smoking status: Never     Passive exposure: Never    Smokeless tobacco: Never   Substance and Sexual Activity    Alcohol use: Yes     Comment: holidays    Drug use: Never    Sexual activity: Not Currently     Partners: Male     Social Determinants of Health     Financial Resource Strain: Low Risk  (2023)    Overall Financial Resource Strain (CARDIA)     Difficulty of Paying Living Expenses: Not hard at all   Food Insecurity: No Food Insecurity (2023)    Hunger Vital Sign     Worried About Running Out of Food in the Last Year: Never true     Ran Out of Food in the Last Year: Never true   Transportation Needs: No Transportation Needs (2023)    PRAPARE - Transportation     Lack of Transportation (Medical): No     Lack of Transportation (Non-Medical): No   Physical Activity: Inactive (2023)    Exercise Vital Sign     Days of Exercise per Week: 0 days     Minutes of Exercise per Session: 0 min   Stress: No Stress Concern Present (2023)    [employer] [place] [employer] - Occupational  Stress Questionnaire     Feeling of Stress : Not at all   Social Connections: Moderately Isolated (7/17/2023)    Social Connection and Isolation Panel [NHANES]     Frequency of Communication with Friends and Family: More than three times a week     Frequency of Social Gatherings with Friends and Family: Once a week     Attends Church Services: More than 4 times per year     Active Member of Clubs or Organizations: No     Attends Club or Organization Meetings: Never     Marital Status: Never    Housing Stability: Low Risk  (7/17/2023)    Housing Stability Vital Sign     Unable to Pay for Housing in the Last Year: No     Number of Places Lived in the Last Year: 1     Unstable Housing in the Last Year: No        Allergies:  Review of patient's allergies indicates:  No Known Allergies     Objective:  Vitals:    10/18/23 0828   BP: 130/84   Pulse: 64   Resp: 18   Temp: 98.1 °F (36.7 °C)     General:  Well developed, well nourished adult female in no acute distress  Abdomen: Soft, nontender, no masses  Extremities:  No clubbing, cyanosis, or edema  Neurologic:  Grossly intact  Musculoskeletal:  Normal tone  :  External genitalia is normal without lesions.  Vagina is normal.      Cystoscopy:        - Urethral meatus:  No strictures        - Urethra:  Normal without strictures or lesions        - Bladder neck:  Normal        - Bladder:  No mucosal abnormalities        - Ureteral orifices:  On the trigone with clear efflux bilaterally    The patient tolerated the procedure well without complications.  She was given Cipro 500mg, one tablet in the clinic.   The urethra was anesthetized with 2% Lidocaine Jelly, Urojet.      Assessment:  1. Asymptomatic microscopic hematuria  - POCT URINE DIPSTICK WITH MICROSCOPE, AUTOMATED    2. Renal calculus  - X-Ray Abdomen AP 1 View; Future    3. Renal cyst     Plan:  Continue to follow the microscopic hematuria with a repeat urinalysis in six months.  KUB in six months to  follow the renal calculi.  Observation of the renal cysts.    Follow-up:    Six months with KUB.

## 2023-10-18 NOTE — LETTER
October 18, 2023      [facility]  [address] [address]  Phone: [phone]       Patient: Laina Macias   YOB: 1960  Date of Visit: 10/18/2023    To Whom It May Concern:    LASHAUN Macias  was at [facility] on 10/18/2023. The patient may return to work/school on 19Oct2023 with no restrictions. If you have any questions or concerns, or if I can be of further assistance, please do not hesitate to contact me.    Sincerely,        Ariella Castillo RN

## 2023-11-04 DIAGNOSIS — I10 PRIMARY HYPERTENSION: ICD-10-CM

## 2023-11-14 ENCOUNTER — LAB VISIT (OUTPATIENT)
Dept: LAB | Facility: HOSPITAL | Age: 63
End: 2023-11-14
Attending: NURSE PRACTITIONER
Payer: COMMERCIAL

## 2023-11-14 DIAGNOSIS — E78.1 PURE HYPERTRIGLYCERIDEMIA: ICD-10-CM

## 2023-11-14 DIAGNOSIS — I10 PRIMARY HYPERTENSION: ICD-10-CM

## 2023-11-14 LAB
ALBUMIN SERPL-MCNC: 3.5 G/DL (ref 3.4–4.8)
ALBUMIN/GLOB SERPL: 0.9 RATIO (ref 1.1–2)
ALP SERPL-CCNC: 54 UNIT/L (ref 40–150)
ALT SERPL-CCNC: 15 UNIT/L (ref 0–55)
AST SERPL-CCNC: 16 UNIT/L (ref 5–34)
BILIRUB SERPL-MCNC: 0.5 MG/DL
BUN SERPL-MCNC: 19.8 MG/DL (ref 9.8–20.1)
CALCIUM SERPL-MCNC: 9.2 MG/DL (ref 8.4–10.2)
CHLORIDE SERPL-SCNC: 107 MMOL/L (ref 98–107)
CHOLEST SERPL-MCNC: 134 MG/DL
CHOLEST/HDLC SERPL: 4 {RATIO} (ref 0–5)
CO2 SERPL-SCNC: 27 MMOL/L (ref 23–31)
CREAT SERPL-MCNC: 0.79 MG/DL (ref 0.55–1.02)
GFR SERPLBLD CREATININE-BSD FMLA CKD-EPI: >60 MLS/MIN/1.73/M2
GLOBULIN SER-MCNC: 3.9 GM/DL (ref 2.4–3.5)
GLUCOSE SERPL-MCNC: 112 MG/DL (ref 82–115)
HDLC SERPL-MCNC: 37 MG/DL (ref 35–60)
LDLC SERPL CALC-MCNC: 54 MG/DL (ref 50–140)
POTASSIUM SERPL-SCNC: 3.6 MMOL/L (ref 3.5–5.1)
PROT SERPL-MCNC: 7.4 GM/DL (ref 5.8–7.6)
SODIUM SERPL-SCNC: 143 MMOL/L (ref 136–145)
TRIGL SERPL-MCNC: 214 MG/DL (ref 37–140)
VLDLC SERPL CALC-MCNC: 43 MG/DL

## 2023-11-14 PROCEDURE — 36415 COLL VENOUS BLD VENIPUNCTURE: CPT

## 2023-11-14 PROCEDURE — 80053 COMPREHEN METABOLIC PANEL: CPT

## 2023-11-14 PROCEDURE — 80061 LIPID PANEL: CPT

## 2023-11-15 RX ORDER — HYDROCHLOROTHIAZIDE 25 MG/1
TABLET ORAL
Qty: 90 TABLET | Refills: 1 | Status: SHIPPED | OUTPATIENT
Start: 2023-11-15 | End: 2024-02-21 | Stop reason: SDUPTHER

## 2023-11-17 ENCOUNTER — OFFICE VISIT (OUTPATIENT)
Dept: INTERNAL MEDICINE | Facility: CLINIC | Age: 63
End: 2023-11-17
Payer: COMMERCIAL

## 2023-11-17 VITALS
HEART RATE: 70 BPM | WEIGHT: 213 LBS | SYSTOLIC BLOOD PRESSURE: 135 MMHG | RESPIRATION RATE: 18 BRPM | TEMPERATURE: 98 F | BODY MASS INDEX: 39.2 KG/M2 | DIASTOLIC BLOOD PRESSURE: 88 MMHG | HEIGHT: 62 IN

## 2023-11-17 DIAGNOSIS — E87.6 HYPOKALEMIA: ICD-10-CM

## 2023-11-17 DIAGNOSIS — N76.0 ACUTE VAGINITIS: ICD-10-CM

## 2023-11-17 DIAGNOSIS — R31.21 ASYMPTOMATIC MICROSCOPIC HEMATURIA: ICD-10-CM

## 2023-11-17 DIAGNOSIS — E66.01 SEVERE OBESITY (BMI 35.0-39.9) WITH COMORBIDITY: ICD-10-CM

## 2023-11-17 DIAGNOSIS — D12.8 ADENOMATOUS RECTAL POLYP: ICD-10-CM

## 2023-11-17 DIAGNOSIS — Z12.31 BREAST CANCER SCREENING BY MAMMOGRAM: ICD-10-CM

## 2023-11-17 DIAGNOSIS — M77.30 CALCANEAL SPUR, UNSPECIFIED LATERALITY: ICD-10-CM

## 2023-11-17 DIAGNOSIS — Z23 NEED FOR VACCINATION: ICD-10-CM

## 2023-11-17 DIAGNOSIS — E78.1 PURE HYPERTRIGLYCERIDEMIA: ICD-10-CM

## 2023-11-17 DIAGNOSIS — I10 PRIMARY HYPERTENSION: Primary | ICD-10-CM

## 2023-11-17 DIAGNOSIS — R19.5 POSITIVE FIT (FECAL IMMUNOCHEMICAL TEST): ICD-10-CM

## 2023-11-17 PROCEDURE — 3075F SYST BP GE 130 - 139MM HG: CPT | Mod: CPTII,,, | Performed by: NURSE PRACTITIONER

## 2023-11-17 PROCEDURE — 3079F PR MOST RECENT DIASTOLIC BLOOD PRESSURE 80-89 MM HG: ICD-10-PCS | Mod: CPTII,,, | Performed by: NURSE PRACTITIONER

## 2023-11-17 PROCEDURE — 3079F DIAST BP 80-89 MM HG: CPT | Mod: CPTII,,, | Performed by: NURSE PRACTITIONER

## 2023-11-17 PROCEDURE — 3008F PR BODY MASS INDEX (BMI) DOCUMENTED: ICD-10-PCS | Mod: CPTII,,, | Performed by: NURSE PRACTITIONER

## 2023-11-17 PROCEDURE — 1160F PR REVIEW ALL MEDS BY PRESCRIBER/CLIN PHARMACIST DOCUMENTED: ICD-10-PCS | Mod: CPTII,,, | Performed by: NURSE PRACTITIONER

## 2023-11-17 PROCEDURE — 3008F BODY MASS INDEX DOCD: CPT | Mod: CPTII,,, | Performed by: NURSE PRACTITIONER

## 2023-11-17 PROCEDURE — 1159F MED LIST DOCD IN RCRD: CPT | Mod: CPTII,,, | Performed by: NURSE PRACTITIONER

## 2023-11-17 PROCEDURE — 99215 OFFICE O/P EST HI 40 MIN: CPT | Mod: PBBFAC | Performed by: NURSE PRACTITIONER

## 2023-11-17 PROCEDURE — 99214 OFFICE O/P EST MOD 30 MIN: CPT | Mod: S$PBB,,, | Performed by: NURSE PRACTITIONER

## 2023-11-17 PROCEDURE — 4010F ACE/ARB THERAPY RXD/TAKEN: CPT | Mod: CPTII,,, | Performed by: NURSE PRACTITIONER

## 2023-11-17 PROCEDURE — 1159F PR MEDICATION LIST DOCUMENTED IN MEDICAL RECORD: ICD-10-PCS | Mod: CPTII,,, | Performed by: NURSE PRACTITIONER

## 2023-11-17 PROCEDURE — 1160F RVW MEDS BY RX/DR IN RCRD: CPT | Mod: CPTII,,, | Performed by: NURSE PRACTITIONER

## 2023-11-17 PROCEDURE — 3044F HG A1C LEVEL LT 7.0%: CPT | Mod: CPTII,,, | Performed by: NURSE PRACTITIONER

## 2023-11-17 PROCEDURE — 3075F PR MOST RECENT SYSTOLIC BLOOD PRESS GE 130-139MM HG: ICD-10-PCS | Mod: CPTII,,, | Performed by: NURSE PRACTITIONER

## 2023-11-17 PROCEDURE — 3044F PR MOST RECENT HEMOGLOBIN A1C LEVEL <7.0%: ICD-10-PCS | Mod: CPTII,,, | Performed by: NURSE PRACTITIONER

## 2023-11-17 PROCEDURE — 4010F PR ACE/ARB THEARPY RXD/TAKEN: ICD-10-PCS | Mod: CPTII,,, | Performed by: NURSE PRACTITIONER

## 2023-11-17 PROCEDURE — 90686 IIV4 VACC NO PRSV 0.5 ML IM: CPT | Mod: PBBFAC

## 2023-11-17 PROCEDURE — 99214 PR OFFICE/OUTPT VISIT, EST, LEVL IV, 30-39 MIN: ICD-10-PCS | Mod: S$PBB,,, | Performed by: NURSE PRACTITIONER

## 2023-11-17 PROCEDURE — 90471 IMMUNIZATION ADMIN: CPT | Mod: PBBFAC

## 2023-11-17 RX ORDER — POTASSIUM CHLORIDE 20 MEQ/1
20 TABLET, EXTENDED RELEASE ORAL DAILY
Qty: 30 TABLET | Refills: 4 | Status: SHIPPED | OUTPATIENT
Start: 2023-11-17 | End: 2024-02-21 | Stop reason: SDUPTHER

## 2023-11-17 RX ORDER — LISINOPRIL 10 MG/1
10 TABLET ORAL DAILY
Qty: 90 TABLET | Refills: 1 | Status: SHIPPED | OUTPATIENT
Start: 2023-11-17 | End: 2024-02-21 | Stop reason: SDUPTHER

## 2023-11-17 RX ORDER — AMLODIPINE BESYLATE 10 MG/1
10 TABLET ORAL DAILY
Qty: 90 EACH | Refills: 1 | Status: SHIPPED | OUTPATIENT
Start: 2023-11-17 | End: 2024-02-21 | Stop reason: SDUPTHER

## 2023-11-17 RX ORDER — FLUCONAZOLE 150 MG/1
150 TABLET ORAL DAILY
Qty: 1 TABLET | Refills: 0 | Status: SHIPPED | OUTPATIENT
Start: 2023-11-17 | End: 2023-11-18

## 2023-11-17 RX ORDER — ATORVASTATIN CALCIUM 10 MG/1
10 TABLET, FILM COATED ORAL NIGHTLY
Qty: 90 TABLET | Refills: 1 | Status: SHIPPED | OUTPATIENT
Start: 2023-11-17 | End: 2024-02-21 | Stop reason: SDUPTHER

## 2023-11-17 RX ADMIN — INFLUENZA VIRUS VACCINE 0.5 ML: 15; 15; 15; 15 SUSPENSION INTRAMUSCULAR at 09:11

## 2023-11-17 NOTE — PROGRESS NOTES
Patient ID: [id number]     Chief Complaint: lab results        HPI:     HPI      Laina Macias is a 63 y.o. female here today for a follow up.         Immunizations:   Immunization History   Administered Date(s) Administered    COVID-19, MRNA, LN-S, PF (MODERNA FULL 0.5 ML DOSE) 2021, 2021, 2021, 2022    Influenza - Quadrivalent - PF *Preferred* (6 months and older) 2023    Tdap 2017        ----------------------------  Anemia  Diverticulosis  Hypertension  Personal history of colonic polyps     Past Surgical History:   Procedure Laterality Date     SECTION      COLONOSCOPY  10/21/2019    COLONOSCOPY, WITH POLYPECTOMY USING SNARE N/A 2023    Procedure: COLONOSCOPY, WITH POLYPECTOMY USING SNARE;  Surgeon: Herbert Hurtado MD;  Location: [facility] ENDOSCOPY;  Service: Endoscopy;  Laterality: N/A;    ectopic pregnancy N/A        Review of patient's allergies indicates:  No Known Allergies    Current Outpatient Medications   Medication Instructions    amLODIPine (NORVASC) 10 mg, Oral, Daily    atorvastatin (LIPITOR) 10 mg, Oral, Nightly    fluconazole (DIFLUCAN) 150 mg, Oral, Daily    hydroCHLOROthiazide (HYDRODIURIL) 25 MG tablet TAKE ONE TABLET BY MOUTH EVERY DAY    lisinopriL 10 mg, Oral, Daily    meloxicam (MOBIC) 7.5 mg, Oral, 2 times daily PRN    potassium chloride SA (K-DUR,KLOR-CON) 20 MEQ tablet 20 mEq, Oral, Daily    tiZANidine (ZANAFLEX) 4 mg, Oral, Every 6 hours PRN       Social History     Socioeconomic History    Marital status: Single   Tobacco Use    Smoking status: Never     Passive exposure: Never    Smokeless tobacco: Never   Substance and Sexual Activity    Alcohol use: Yes     Comment: holidays    Drug use: Never    Sexual activity: Not Currently     Partners: Male     Social Determinants of Health     Financial Resource Strain: Low Risk  (2023)    Overall Financial Resource Strain (CARDIA)     Difficulty of Paying Living Expenses: Not hard at  all   Food Insecurity: No Food Insecurity (7/17/2023)    Hunger Vital Sign     Worried About Running Out of Food in the Last Year: Never true     Ran Out of Food in the Last Year: Never true   Transportation Needs: No Transportation Needs (7/17/2023)    PRAPARE - Transportation     Lack of Transportation (Medical): No     Lack of Transportation (Non-Medical): No   Physical Activity: Inactive (7/17/2023)    Exercise Vital Sign     Days of Exercise per Week: 0 days     Minutes of Exercise per Session: 0 min   Stress: No Stress Concern Present (7/17/2023)    [employer] [place] [employer] - Occupational Stress Questionnaire     Feeling of Stress : Not at all   Social Connections: Moderately Isolated (7/17/2023)    Social Connection and Isolation Panel [NHANES]     Frequency of Communication with Friends and Family: More than three times a week     Frequency of Social Gatherings with Friends and Family: Once a week     Attends Orthodoxy Services: More than 4 times per year     Active Member of Clubs or Organizations: No     Attends Club or Organization Meetings: Never     Marital Status: Never    Housing Stability: Low Risk  (7/17/2023)    Housing Stability Vital Sign     Unable to Pay for Housing in the Last Year: No     Number of Places Lived in the Last Year: 1     Unstable Housing in the Last Year: No        Family History   Problem Relation Age of Onset    Diabetes type II Mother     Hypertension Mother         Patient Care Team:  Dea Campbell FNP as PCP - General (Family Medicine)  Stephanie Perry LPN as Care Coordinator     Subjective:     Review of Systems     See HPI for details    Constitutional: Denies Change in appetite. Denies Chills. Denies Fever. Denies Night sweats.  Eye: Denies Blurred vision. Denies Discharge. Denies Eye pain.  ENT: Denies Decreased hearing. Denies Sore throat. Denies Swollen glands.  Respiratory: Denies Cough. Denies Shortness of breath. Denies Shortness  "of breath with exertion. Denies Wheezing.  Cardiovascular: DeniesChest pain at rest. Denies Chest pain with exertion. Denies Irregular heartbeat. Denies Palpitations. Denies Edema.  Gastrointestinal: Denies Abdominal pain. DeniesDiarrhea. Denies Nausea. Denies Vomiting. Denies Hematemesis or Hematochezia.  Genitourinary: Denies Dysuria. Denies Urinary frequency. Denies Urinary urgency. Denies Blood in urine.  Endocrine: Denies Cold intolerance. Denies Excessive thirst. Denies Heat intolerance. Denies Weight loss. Denies Weight gain.  Musculoskeletal: Denies Painful joints. Denies Weakness.  Integumentary: Denies Rash. Denies Itching. Denies Dry skin.  Neurologic: Denies Dizziness. Denies Fainting. Denies Headache.  Psychiatric: Denies Depression. Denies Anxiety. Denies Suicidal/Homicidal ideations.    All Other ROS: Negative except as stated in HPI.       Objective:     Visit Vitals  /88 (BP Location: Right arm, Patient Position: Sitting, BP Method: Large (Automatic))   Pulse 70   Temp 97.8 °F (36.6 °C) (Oral)   Resp 18   Ht 5' 2" (1.575 m)   Wt 96.6 kg (213 lb)   BMI 38.96 kg/m²       Physical Exam    General: Alert and oriented, No acute distress.  Head: Normocephalic, Atraumatic.  Eye: Pupils are equal, round and reactive to light, Extraocular movements are intact, Sclera non-icteric.  Ears/Nose/Throat: Normal, Mucosa moist,Clear.  Neck/Thyroid: Supple, Non-tender, No carotid bruit, No lymphadenopathy, No JVD, Full range of motion.  Respiratory: Clear to auscultation bilaterally; No wheezes, rales or rhonchi,Non-labored respirations, Symmetrical chest wall expansion.  Cardiovascular: Regular rate and rhythm, S1/S2 normal, No murmurs, rubs or gallops.  Gastrointestinal: Soft, Non-tender, Non-distended, Normal bowel sounds, No palpable organomegaly.  Musculoskeletal: Normal range of motion.  Integumentary: Warm, Dry, Intact, No suspicious lesions or rashes.  Extremities: No clubbing, cyanosis or " edema  Neurologic: No focal deficits, Cranial Nerves II-XII are grossly intact, Motor strength normal upper and lower extremities, Sensory exam intact.  Psychiatric: Normal interaction, Coherent speech, Euthymic mood, Appropriate affect       Labs Reviewed:     Chemistry:  Lab Results   Component Value Date     11/14/2023    K 3.6 11/14/2023    CHLORIDE 107 11/14/2023    BUN 19.8 11/14/2023    CREATININE 0.79 11/14/2023    EGFRNORACEVR >60 11/14/2023    GLUCOSE 112 11/14/2023    CALCIUM 9.2 11/14/2023    ALKPHOS 54 11/14/2023    LABPROT 7.4 11/14/2023    ALBUMIN 3.5 11/14/2023    BILIDIR 0.1 05/08/2021    IBILI 0.30 05/08/2021    AST 16 11/14/2023    ALT 15 11/14/2023    IOVJIHQK77AW 39.3 05/08/2021        Lab Results   Component Value Date    HGBA1C 5.6 01/13/2023        Hematology:  Lab Results   Component Value Date    WBC 7.3 01/13/2023    HGB 14.1 01/13/2023    HCT 40.6 01/13/2023     01/13/2023       Lipid Panel:  Lab Results   Component Value Date    CHOL 134 11/14/2023    HDL 37 11/14/2023    LDL 54.00 11/14/2023    TRIG 214 (H) 11/14/2023    TOTALCHOLEST 4 11/14/2023        Urine:  Lab Results   Component Value Date    COLORUA Light-Yellow 08/08/2023    APPEARANCEUA Clear 08/08/2023    SGUA 1.016 08/08/2023    PHUA 6.0 08/08/2023    PROTEINUA 2+ (A) 08/08/2023    GLUCOSEUA Normal 08/08/2023    KETONESUA Negative 08/08/2023    BLOODUA 2+ (A) 08/08/2023    NITRITESUA Negative 08/08/2023    LEUKOCYTESUR Negative 08/08/2023    RBCUA 6-10 (A) 08/08/2023    WBCUA 0-5 08/08/2023    BACTERIA None Seen 08/08/2023    SQEPUA Trace (A) 08/08/2023    HYALINECASTS None Seen 08/08/2023        Assessment:       ICD-10-CM ICD-9-CM   1. Primary hypertension  I10 401.9   2. Pure hypertriglyceridemia  E78.1 272.1   3. Hypokalemia  E87.6 276.8   4. Positive FIT (fecal immunochemical test)  R19.5 792.1   5. Severe obesity (BMI 35.0-39.9) with comorbidity  E66.01 278.01   6. Adenomatous rectal polyp  D12.8 211.4   7.  Asymptomatic microscopic hematuria  R31.21 599.72   8. Breast cancer screening by mammogram  Z12.31 V76.12   9. Need for vaccination  Z23 V05.9   10. Calcaneal spur, unspecified laterality  M77.30 726.73   11. Acute vaginitis  N76.0 616.10        Plan:     1. Primary hypertension  Low fat low salt diet and exercise. Cont Lisinopril, HCTZ, Amlodipine as prescribed.      2. Pure hypertriglyceridemia   Low fat diet and exercise.  Cont Atorvastatin as prescribed.      3. Hypokalemia  Resolved. Will monitor.      4. Positive FIT (fecal immunochemical test)  UTD colonoscopy done 1-26-23 polyps X 4 no malignancy- repeat in 3 years (1/2026).     5. Severe obesity (BMI 35.0-39.9) with comorbidity  Low fat diet and exercise encouraged. Education provided.     6. Adenomatous rectal polyp  UTD colonoscopy done 1-26-23 polyps X 4 no malignancy- repeat in 3 years (1/2026).     7. Asymptomatic microscopic hematuria  Pt was last seen in Uro cl 6-10-21 after cystoscopy with RTC in 3-4 months with renal US. Refer to Uro cl for further eval and mgmt.     8. Breast cancer screening by mammogram  MMG in 1 month.     9. Need for vaccination  Give flu vaccine today.    10. Vaginitis  RX Diflucan 150 mg 1 tab po X 1 dose.     11. Foot pain   Refer to [facility] foot care for eval and mgmt of heel spur.       Follow up in about 3 months (around 2/17/2024) for with labs 1 week prior to appt. . In addition to their scheduled follow up, the patient has also been instructed to follow up on as needed basis.     Future Appointments   Date Time Provider Department Center   1/19/2024  8:10 AM Lilia Medrano ANP [facility] GYN [place] [facility]   4/17/2024 10:30 AM Jatinder Pires, [place] [facility] UROLO [place] [facility]        ROSA Callahan

## 2023-12-13 ENCOUNTER — HOSPITAL ENCOUNTER (EMERGENCY)
Facility: HOSPITAL | Age: 63
Discharge: HOME | End: 2023-12-13
Attending: EMERGENCY MEDICINE
Payer: COMMERCIAL

## 2023-12-13 VITALS
DIASTOLIC BLOOD PRESSURE: 81 MMHG | RESPIRATION RATE: 16 BRPM | OXYGEN SATURATION: 98 % | WEIGHT: 212.31 LBS | HEIGHT: 63 IN | HEART RATE: 82 BPM | SYSTOLIC BLOOD PRESSURE: 134 MMHG | BODY MASS INDEX: 37.62 KG/M2 | TEMPERATURE: 98 F

## 2023-12-13 DIAGNOSIS — J06.9 UPPER RESPIRATORY TRACT INFECTION, UNSPECIFIED TYPE: Primary | ICD-10-CM

## 2023-12-13 LAB
FLUAV AG UPPER RESP QL IA.RAPID: NOT DETECTED
FLUBV AG UPPER RESP QL IA.RAPID: NOT DETECTED
RSV A 5' UTR RNA NPH QL NAA+PROBE: NOT DETECTED
SARS-COV-2 RNA RESP QL NAA+PROBE: NOT DETECTED
STREP A PCR (OHS): NOT DETECTED

## 2023-12-13 PROCEDURE — 99283 EMERGENCY DEPT VISIT LOW MDM: CPT

## 2023-12-13 PROCEDURE — 0241U COVID/RSV/FLU A&B PCR: CPT | Performed by: NURSE PRACTITIONER

## 2023-12-13 PROCEDURE — 87651 STREP A DNA AMP PROBE: CPT | Performed by: NURSE PRACTITIONER

## 2023-12-13 RX ORDER — PROMETHAZINE HYDROCHLORIDE AND DEXTROMETHORPHAN HYDROBROMIDE 6.25; 15 MG/5ML; MG/5ML
5 SYRUP ORAL EVERY 4 HOURS PRN
Qty: 120 ML | Refills: 0 | Status: SHIPPED | OUTPATIENT
Start: 2023-12-13 | End: 2023-12-23

## 2023-12-13 NOTE — Clinical Note
"Wayne COMBSEVELYN Macias was seen and treated in our emergency department on 12/13/2023.  She may return to work on 12/18/2023.       If you have any questions or concerns, please don't hesitate to call.      Esteban Paz, ACNP"

## 2023-12-13 NOTE — ED PROVIDER NOTES
Encounter Date: 2023       History     Chief Complaint   Patient presents with    Sore Throat     C/o sore throat and nasal congestion x2 days. Works at a school.      The patient presents with occas nonprod cough, sore throat, nasal congestion, subjective fever, no cp or sob, no known covid-19 exposure.  The onset was 2  days ago.  The course/duration of symptoms is constant.  The degree at present is minimal.  The exacerbating factor is none.  The relieving factor is none.  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  Prior episodes: occasional.  Associated symptoms: dry cough, nasal congestion, rhinorrhea, sore throat, denies chest pain and denies shortness of breath.  Additional history: none.         Review of patient's allergies indicates:  No Known Allergies  Past Medical History:   Diagnosis Date    Anemia     Diverticulosis     Hypertension     Personal history of colonic polyps      Past Surgical History:   Procedure Laterality Date     SECTION      COLONOSCOPY  10/21/2019    COLONOSCOPY, WITH POLYPECTOMY USING SNARE N/A 2023    Procedure: COLONOSCOPY, WITH POLYPECTOMY USING SNARE;  Surgeon: Herbert Hurtado MD;  Location: [facility] ENDOSCOPY;  Service: Endoscopy;  Laterality: N/A;    ectopic pregnancy N/A      Family History   Problem Relation Age of Onset    Diabetes type II Mother     Hypertension Mother      Social History     Tobacco Use    Smoking status: Never     Passive exposure: Never    Smokeless tobacco: Never   Substance Use Topics    Alcohol use: Yes     Comment: holidays    Drug use: Never     Review of Systems   Constitutional:  Positive for fever.   HENT:  Positive for congestion and sore throat.    Respiratory:  Positive for cough. Negative for shortness of breath.    Cardiovascular:  Negative for chest pain.   Gastrointestinal:  Negative for nausea.   Genitourinary:  Negative for dysuria.   Musculoskeletal:  Negative for back pain.   Skin:  Negative for rash.    Neurological:  Negative for weakness.   Hematological:  Does not bruise/bleed easily.   All other systems reviewed and are negative.      Physical Exam     Initial Vitals [12/13/23 0658]   BP Pulse Resp Temp SpO2   134/81 82 16 97.5 °F (36.4 °C) 98 %      MAP       --         Physical Exam    Nursing note and vitals reviewed.  Constitutional: She appears well-developed and well-nourished.   HENT:   Head: Normocephalic and atraumatic.   Right Ear: Tympanic membrane normal.   Left Ear: Tympanic membrane normal.   Nose: Nose normal.   Mouth/Throat: Uvula is midline, oropharynx is clear and moist and mucous membranes are normal.   Neck: Neck supple.   Normal range of motion.  Cardiovascular:  Normal rate, regular rhythm, normal heart sounds and intact distal pulses.           Pulmonary/Chest: Breath sounds normal.   Abdominal: Abdomen is soft. Bowel sounds are normal.   Musculoskeletal:         General: Normal range of motion.      Cervical back: Normal range of motion and neck supple.     Neurological: She is alert. She has normal strength.   Skin: Skin is warm and dry.   Psychiatric: She has a normal mood and affect.         ED Course   Procedures  Labs Reviewed   COVID/RSV/FLU A&B PCR - Normal    Narrative:     The Xpert Xpress SARS-CoV-2/FLU/RSV plus is a rapid, multiplexed real-time PCR test intended for the simultaneous qualitative detection and differentiation of SARS-CoV-2, Influenza A, Influenza B, and respiratory syncytial virus (RSV) viral RNA in either nasopharyngeal swab or nasal swab specimens.         STREP GROUP A BY PCR - Normal    Narrative:     The Xpert Xpress Strep A test is a rapid, qualitative in vitro diagnostic test for the detection of Streptococcus pyogenes (Group A ß-hemolytic Streptococcus, Strep A) in throat swab specimens from patients with signs and symptoms of pharyngitis.            Imaging Results    None          Medications - No data to display  Medical Decision Making  The patient  presents with occas nonprod cough, sore throat, nasal congestion, subjective fever, no cp or sob, no known covid-19 exposure.  The onset was 2  days ago.  The course/duration of symptoms is constant.  The degree at present is minimal.  The exacerbating factor is none.  The relieving factor is none.  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  Prior episodes: occasional.  Associated symptoms: dry cough, nasal congestion, rhinorrhea, sore throat, denies chest pain and denies shortness of breath.  Additional history: none.       9:09 AM DISPOSITION: The patient is resting comfortably in no acute distress.  She is hemodynamically stable and is without objective evidence for acute process requiring urgent intervention or hospitalization. I provided counseling to patient with regard to condition, the treatment plan, specific conditions for return, and the importance of follow up. Detailed written and verbal instructions provided to patient and she expressed a verbal understanding of the discharge instructions and overall management plan. Reiterated the importance of medication administration and safety and advised patient to follow up with primary care provider in 3-5 days or sooner if needed.  Answered questions at this time. The patient is stable for discharge.       Amount and/or Complexity of Data Reviewed  Labs: ordered. Decision-making details documented in ED Course.      Additional MDM:   Differential Diagnosis:   Influenza, covid, strep Throat, viral pharyngitis, mononucleosis, HIV, GC, Herpangina, Oral candida, diphtheria, among others              ED Course as of 12/13/23 0910   Wed Dec 13, 2023   0905 STREP A PCR ([facility]): Not Detected [RB]   0905 Influenza A, Molecular: Not Detected [RB]   0905 Influenza B, Molecular: Not Detected [RB]   0905 SARS-CoV2 (COVID-19) Qualitative PCR: Not Detected [RB]   0905 RSV Ag by Molecular Method: Not Detected [RB]      ED Course User Index  [RB] Esteban Paz, GILDAP                            Clinical Impression:  Final diagnoses:  [J06.9] Upper respiratory tract infection, unspecified type (Primary)          ED Disposition Condition    Discharge Stable          ED Prescriptions       Medication Sig Dispense Start Date End Date Auth. Provider    promethazine-dextromethorphan (PROMETHAZINE-DM) 6.25-15 mg/5 mL Syrp Take 5 mLs by mouth every 4 (four) hours as needed (cough). 120 mL 12/13/2023 12/23/2023 Esteban Paz ACNP          Follow-up Information       Follow up With Specialties Details Why Contact Info    Dea Campbell, FNP Family Medicine In 3 days  [address] [facility] [address]  [phone]      [facility] Emergency Medicine  If symptoms worsen [address] [id number] [phone]             Esteban Paz ACNP  12/13/23 0910

## 2023-12-27 ENCOUNTER — TELEPHONE (OUTPATIENT)
Dept: INTERNAL MEDICINE | Facility: CLINIC | Age: 63
End: 2023-12-27
Payer: COMMERCIAL

## 2023-12-27 NOTE — TELEPHONE ENCOUNTER
Called and spoke to pt. Stated that [facility] does not accept her insurance . Requesting referral be sent to  [facility] Telephone # [phone] / Fax # [phone]

## 2023-12-27 NOTE — TELEPHONE ENCOUNTER
----- Message from Arina Pereira sent at 12/27/2023  9:13 AM CST -----  Regarding: LIDIA/REFERRAL  Patient requesting referral to podiatrist [facility] [phone]

## 2023-12-29 DIAGNOSIS — M79.672 HEEL PAIN, BILATERAL: Primary | ICD-10-CM

## 2023-12-29 DIAGNOSIS — M79.671 HEEL PAIN, BILATERAL: Primary | ICD-10-CM

## 2023-12-29 DIAGNOSIS — M77.30 CALCANEAL SPUR, UNSPECIFIED LATERALITY: ICD-10-CM

## 2023-12-29 NOTE — PROGRESS NOTES
Refer to Dr Guerrero at [facility] for eval and mgmt of bilat heel pain/ heel spur per pt request.

## 2024-01-03 ENCOUNTER — HOSPITAL ENCOUNTER (OUTPATIENT)
Dept: RADIOLOGY | Facility: HOSPITAL | Age: 64
Discharge: HOME | End: 2024-01-03
Attending: NURSE PRACTITIONER
Payer: COMMERCIAL

## 2024-01-03 DIAGNOSIS — Z12.31 BREAST CANCER SCREENING BY MAMMOGRAM: ICD-10-CM

## 2024-01-03 PROCEDURE — 77067 SCR MAMMO BI INCL CAD: CPT | Mod: TC

## 2024-01-03 PROCEDURE — 77067 SCR MAMMO BI INCL CAD: CPT | Mod: 26,,, | Performed by: RADIOLOGY

## 2024-01-03 PROCEDURE — 77063 BREAST TOMOSYNTHESIS BI: CPT | Mod: 26,,, | Performed by: RADIOLOGY

## 2024-01-19 ENCOUNTER — TELEPHONE (OUTPATIENT)
Dept: GYNECOLOGY | Facility: CLINIC | Age: 64
End: 2024-01-19

## 2024-01-19 ENCOUNTER — OFFICE VISIT (OUTPATIENT)
Dept: GYNECOLOGY | Facility: CLINIC | Age: 64
End: 2024-01-19
Payer: COMMERCIAL

## 2024-01-19 ENCOUNTER — TELEPHONE (OUTPATIENT)
Dept: INTERNAL MEDICINE | Facility: CLINIC | Age: 64
End: 2024-01-19
Payer: COMMERCIAL

## 2024-01-19 VITALS
DIASTOLIC BLOOD PRESSURE: 84 MMHG | HEART RATE: 72 BPM | OXYGEN SATURATION: 100 % | HEIGHT: 63 IN | TEMPERATURE: 98 F | WEIGHT: 211 LBS | SYSTOLIC BLOOD PRESSURE: 124 MMHG | BODY MASS INDEX: 37.39 KG/M2

## 2024-01-19 DIAGNOSIS — Z01.419 ENCOUNTER FOR ANNUAL ROUTINE GYNECOLOGICAL EXAMINATION: Primary | ICD-10-CM

## 2024-01-19 DIAGNOSIS — N95.2 ATROPHIC VAGINITIS: Primary | ICD-10-CM

## 2024-01-19 DIAGNOSIS — N89.8 VAGINAL ITCHING: ICD-10-CM

## 2024-01-19 LAB
CLUE CELLS VAG QL WET PREP: ABNORMAL
T VAGINALIS VAG QL WET PREP: ABNORMAL
WBC #/AREA VAG WET PREP: ABNORMAL
YEAST SPEC QL WET PREP: ABNORMAL

## 2024-01-19 PROCEDURE — 3008F BODY MASS INDEX DOCD: CPT | Mod: CPTII,,, | Performed by: NURSE PRACTITIONER

## 2024-01-19 PROCEDURE — 99396 PREV VISIT EST AGE 40-64: CPT | Mod: S$PBB,,, | Performed by: NURSE PRACTITIONER

## 2024-01-19 PROCEDURE — 1159F MED LIST DOCD IN RCRD: CPT | Mod: CPTII,,, | Performed by: NURSE PRACTITIONER

## 2024-01-19 PROCEDURE — 3074F SYST BP LT 130 MM HG: CPT | Mod: CPTII,,, | Performed by: NURSE PRACTITIONER

## 2024-01-19 PROCEDURE — 99213 OFFICE O/P EST LOW 20 MIN: CPT | Mod: PBBFAC | Performed by: NURSE PRACTITIONER

## 2024-01-19 PROCEDURE — 87210 SMEAR WET MOUNT SALINE/INK: CPT | Performed by: NURSE PRACTITIONER

## 2024-01-19 PROCEDURE — 3079F DIAST BP 80-89 MM HG: CPT | Mod: CPTII,,, | Performed by: NURSE PRACTITIONER

## 2024-01-19 NOTE — TELEPHONE ENCOUNTER
Pt called and I informed her of results. Also notified the pt that medicine was sent to her pharmacy. Pt confirmed understanding.

## 2024-01-19 NOTE — TELEPHONE ENCOUNTER
Pt is requesting a rx for motion sickness, is this something you can assist with or pt can get medication OTC.    LOV:11/17/23  NOV:2/21/24

## 2024-01-19 NOTE — TELEPHONE ENCOUNTER
Inform pt that swab was negative for infection. Will proceed with ordering vaginal estrogen cream that we discussed during visit. It was sent to [facility], if medication is too expensive inform pt to call clinic and I can send to [facility] pharmacy.

## 2024-01-19 NOTE — PROGRESS NOTES
"  [facility] -  Gynecology / Women's Health Clinic      Subjective:       Patient ID: Laina Macias is a 63 y.o. female.    Chief Complaint:  Gynecologic Exam    History of Present Illness  Pt is  here for annual GYN exam. History of tubal pregnancy with unilateral salpingo-oophorectomy, unsure of which side. History of abnormal pap smear in 2019-NIL and HPV positive (16), repeat pap in -NIL and HPV neg. Last pap -NIL and HPV neg. Pt is postmenopausal. Denies hot flashes. Denies abdominal or pelvic pain. Denies vaginal bleeding, itching, or discharge. Denies history of or concern for STI's. Last mammogram 1/3/24- BIRADS 1. Denies tobacco use. Colonoscopy in  with polypectomy, repeat in 5 year. Denies fly hx of breast, ovarian, uterine and colon. Pt is followed by IM for primary care.     GYN & OB History  No LMP recorded. Patient is postmenopausal.   Date of Last Pap: 2023    Review of patient's allergies indicates:  No Known Allergies  Past Medical History:   Diagnosis Date    Anemia     Diverticulosis     Hypertension     Personal history of colonic polyps      OB History    Para Term  AB Living   4 3     1     SAB IAB Ectopic Multiple Live Births       1          # Outcome Date GA Lbr Kamlesh/2nd Weight Sex Delivery Anes PTL Lv   4 Ectopic            3 Para            2 Para            1 Para                 Review of Systems  Review of Systems    Negative except for pertinent findings for positives per HPI     Objective:    Physical Exam    /84 (BP Location: Left arm, Patient Position: Sitting, BP Method: Large (Automatic))   Pulse 72   Temp 98 °F (36.7 °C) (Oral)   Ht 5' 3" (1.6 m)   Wt 95.7 kg (211 lb)   SpO2 100%   BMI 37.38 kg/m²   GENERAL: Well-developed female. No acute distress.    SKIN: Normal to inspection, warm and intact.  BREASTS: No rashes or erythema. No masses, lumps, discharge, tenderness.  VULVA: General appearance normal; " external genitalia with no lesions or erythema.  VAGINA: Mucosa/vaginal vault atrophic, no abnormal discharge or lesions.  CERVIX: atrophic, nulliparous appearing os, no erythema or abnormal discharge.  BIMANUAL EXAM: reveals a 12 week-sized uterus. The uterus is non tender. Mahad adnexa reveal no tenderness.  PSYCHIATRIC: Patient is oriented to person, place, and time. Mood and affect are normal.    Assessment:       1. Encounter for annual routine gynecological examination    2. Vaginal itching       Plan:   Laina was seen today for gynecologic exam.    Diagnoses and all orders for this visit:    Encounter for annual routine gynecological examination    Vaginal itching  -     Wet Prep, Genital    Pelvic exam, pap utd per ACOG  Wet prep  Will consider Premarin cream for atrophic vaginitis if no infection  Follow up in about 1 year (around 1/19/2025) for annual exam.

## 2024-01-19 NOTE — LETTER
January 19, 2024    Laina Macias  [address] [place] [address]             [facility]  Gynecology  [address] [facility] [address]  Phone: [phone]   January 19, 2024     Patient: Laina Macias   YOB: 1960   Date of Visit: 1/19/2024       To Whom it May Concern:    Laina Macias was seen in my clinic on 1/19/2024. She may return to work on 01/22/2024 .    Please excuse her from any work missed.    If you have any questions or concerns, please don't hesitate to call.    Sincerely,         Lilia Medrano, ANP

## 2024-02-19 PROBLEM — Z00.00 WELL ADULT EXAM: Status: RESOLVED | Noted: 2022-05-23 | Resolved: 2024-02-19

## 2024-02-21 ENCOUNTER — LAB VISIT (OUTPATIENT)
Dept: LAB | Facility: HOSPITAL | Age: 64
End: 2024-02-21
Attending: NURSE PRACTITIONER
Payer: COMMERCIAL

## 2024-02-21 ENCOUNTER — OFFICE VISIT (OUTPATIENT)
Dept: INTERNAL MEDICINE | Facility: CLINIC | Age: 64
End: 2024-02-21
Attending: NURSE PRACTITIONER
Payer: COMMERCIAL

## 2024-02-21 DIAGNOSIS — E78.1 PURE HYPERTRIGLYCERIDEMIA: ICD-10-CM

## 2024-02-21 DIAGNOSIS — D12.8 ADENOMATOUS RECTAL POLYP: ICD-10-CM

## 2024-02-21 DIAGNOSIS — I10 PRIMARY HYPERTENSION: Primary | ICD-10-CM

## 2024-02-21 DIAGNOSIS — M79.605 LEFT LEG PAIN: ICD-10-CM

## 2024-02-21 DIAGNOSIS — I10 PRIMARY HYPERTENSION: ICD-10-CM

## 2024-02-21 DIAGNOSIS — R19.5 POSITIVE FIT (FECAL IMMUNOCHEMICAL TEST): ICD-10-CM

## 2024-02-21 DIAGNOSIS — Z12.31 BREAST CANCER SCREENING BY MAMMOGRAM: ICD-10-CM

## 2024-02-21 DIAGNOSIS — E87.6 HYPOKALEMIA: ICD-10-CM

## 2024-02-21 DIAGNOSIS — R31.21 ASYMPTOMATIC MICROSCOPIC HEMATURIA: ICD-10-CM

## 2024-02-21 DIAGNOSIS — E66.01 SEVERE OBESITY (BMI 35.0-39.9) WITH COMORBIDITY: ICD-10-CM

## 2024-02-21 LAB
ANION GAP SERPL CALC-SCNC: 9 MEQ/L
BASOPHILS # BLD AUTO: 0.05 X10(3)/MCL
BASOPHILS NFR BLD AUTO: 0.7 %
BUN SERPL-MCNC: 18.6 MG/DL (ref 9.8–20.1)
CALCIUM SERPL-MCNC: 9.2 MG/DL (ref 8.4–10.2)
CHLORIDE SERPL-SCNC: 105 MMOL/L (ref 98–107)
CHOLEST SERPL-MCNC: 145 MG/DL
CHOLEST/HDLC SERPL: 4 {RATIO} (ref 0–5)
CO2 SERPL-SCNC: 27 MMOL/L (ref 23–31)
CREAT SERPL-MCNC: 0.76 MG/DL (ref 0.55–1.02)
CREAT/UREA NIT SERPL: 24
EOSINOPHIL # BLD AUTO: 0.16 X10(3)/MCL (ref 0–0.9)
EOSINOPHIL NFR BLD AUTO: 2.4 %
ERYTHROCYTE [DISTWIDTH] IN BLOOD BY AUTOMATED COUNT: 14.4 % (ref 11.5–17)
EST. AVERAGE GLUCOSE BLD GHB EST-MCNC: 111.2 MG/DL
GFR SERPLBLD CREATININE-BSD FMLA CKD-EPI: >60 MLS/MIN/1.73/M2
GLUCOSE SERPL-MCNC: 111 MG/DL (ref 82–115)
HBA1C MFR BLD: 5.5 %
HCT VFR BLD AUTO: 38.5 % (ref 37–47)
HDLC SERPL-MCNC: 38 MG/DL (ref 35–60)
HGB BLD-MCNC: 13.9 G/DL (ref 12–16)
IMM GRANULOCYTES # BLD AUTO: 0.02 X10(3)/MCL (ref 0–0.04)
IMM GRANULOCYTES NFR BLD AUTO: 0.3 %
LDLC SERPL CALC-MCNC: 58 MG/DL (ref 50–140)
LYMPHOCYTES # BLD AUTO: 3.34 X10(3)/MCL (ref 0.6–4.6)
LYMPHOCYTES NFR BLD AUTO: 49.7 %
MCH RBC QN AUTO: 28.7 PG (ref 27–31)
MCHC RBC AUTO-ENTMCNC: 36.1 G/DL (ref 33–36)
MCV RBC AUTO: 79.5 FL (ref 80–94)
MONOCYTES # BLD AUTO: 0.66 X10(3)/MCL (ref 0.1–1.3)
MONOCYTES NFR BLD AUTO: 9.8 %
NEUTROPHILS # BLD AUTO: 2.49 X10(3)/MCL (ref 2.1–9.2)
NEUTROPHILS NFR BLD AUTO: 37.1 %
NRBC BLD AUTO-RTO: 0 %
PLATELET # BLD AUTO: 398 X10(3)/MCL (ref 130–400)
PMV BLD AUTO: 9.3 FL (ref 7.4–10.4)
POTASSIUM SERPL-SCNC: 3.2 MMOL/L (ref 3.5–5.1)
RBC # BLD AUTO: 4.84 X10(6)/MCL (ref 4.2–5.4)
SODIUM SERPL-SCNC: 141 MMOL/L (ref 136–145)
TRIGL SERPL-MCNC: 244 MG/DL (ref 37–140)
VLDLC SERPL CALC-MCNC: 49 MG/DL
WBC # SPEC AUTO: 6.72 X10(3)/MCL (ref 4.5–11.5)

## 2024-02-21 PROCEDURE — 1160F RVW MEDS BY RX/DR IN RCRD: CPT | Mod: CPTII,95,, | Performed by: NURSE PRACTITIONER

## 2024-02-21 PROCEDURE — 85025 COMPLETE CBC W/AUTO DIFF WBC: CPT

## 2024-02-21 PROCEDURE — 3044F HG A1C LEVEL LT 7.0%: CPT | Mod: CPTII,95,, | Performed by: NURSE PRACTITIONER

## 2024-02-21 PROCEDURE — 1159F MED LIST DOCD IN RCRD: CPT | Mod: CPTII,95,, | Performed by: NURSE PRACTITIONER

## 2024-02-21 PROCEDURE — 99213 OFFICE O/P EST LOW 20 MIN: CPT | Mod: 95,,, | Performed by: NURSE PRACTITIONER

## 2024-02-21 PROCEDURE — 4010F ACE/ARB THERAPY RXD/TAKEN: CPT | Mod: CPTII,95,, | Performed by: NURSE PRACTITIONER

## 2024-02-21 PROCEDURE — 80061 LIPID PANEL: CPT

## 2024-02-21 PROCEDURE — 80048 BASIC METABOLIC PNL TOTAL CA: CPT

## 2024-02-21 PROCEDURE — 36415 COLL VENOUS BLD VENIPUNCTURE: CPT

## 2024-02-21 PROCEDURE — 83036 HEMOGLOBIN GLYCOSYLATED A1C: CPT

## 2024-02-21 RX ORDER — MELOXICAM 7.5 MG/1
7.5 TABLET ORAL 2 TIMES DAILY PRN
Qty: 60 TABLET | Refills: 3 | Status: SHIPPED | OUTPATIENT
Start: 2024-02-21

## 2024-02-21 RX ORDER — AMLODIPINE BESYLATE 10 MG/1
10 TABLET ORAL DAILY
Qty: 90 TABLET | Refills: 1 | Status: SHIPPED | OUTPATIENT
Start: 2024-02-21 | End: 2024-05-22 | Stop reason: SDUPTHER

## 2024-02-21 RX ORDER — HYDROCHLOROTHIAZIDE 25 MG/1
TABLET ORAL
Qty: 90 TABLET | Refills: 1 | Status: SHIPPED | OUTPATIENT
Start: 2024-02-21 | End: 2024-05-22

## 2024-02-21 RX ORDER — ATORVASTATIN CALCIUM 10 MG/1
10 TABLET, FILM COATED ORAL NIGHTLY
Qty: 90 TABLET | Refills: 1 | Status: SHIPPED | OUTPATIENT
Start: 2024-02-21 | End: 2024-05-22 | Stop reason: SDUPTHER

## 2024-02-21 RX ORDER — TIZANIDINE 4 MG/1
4 TABLET ORAL EVERY 6 HOURS PRN
Qty: 90 TABLET | Refills: 1 | Status: SHIPPED | OUTPATIENT
Start: 2024-02-21

## 2024-02-21 RX ORDER — POTASSIUM CHLORIDE 20 MEQ/1
20 TABLET, EXTENDED RELEASE ORAL DAILY
Qty: 30 TABLET | Refills: 4 | Status: SHIPPED | OUTPATIENT
Start: 2024-02-21 | End: 2024-05-11

## 2024-02-21 RX ORDER — LISINOPRIL 10 MG/1
10 TABLET ORAL DAILY
Qty: 90 TABLET | Refills: 1 | Status: SHIPPED | OUTPATIENT
Start: 2024-02-21 | End: 2024-05-22

## 2024-02-21 NOTE — PROGRESS NOTES
Patient ID: [id number]     Chief Complaint: LAB RESULTS      HPI:     This is a telemedicine note. Patient was treated using telemedicine, real time audio and video, according to [facility] protocols. IDea, conducted the visit from the [facility]. The patient participated in the visit at a non-[facility] location selected by the patient, identified below. I am licensed in the state where the patient stated they are located. The patient stated that they understood and accepted the privacy and security risks to their information at their location. This visit is not recorded.    Patient was located at the patient's home.       Laina Macias is a 63 y.o. female here today for a telemedicine visit. Pt attempted to connect virtually but was unsuccessful so visit was conducted audio only.       ----------------------------  Anemia  Diverticulosis  Hypertension  Personal history of colonic polyps     Past Surgical History:   Procedure Laterality Date     SECTION      COLONOSCOPY  10/21/2019    COLONOSCOPY, WITH POLYPECTOMY USING SNARE N/A 2023    Procedure: COLONOSCOPY, WITH POLYPECTOMY USING SNARE;  Surgeon: Herbert Hurtado MD;  Location: [facility] ENDOSCOPY;  Service: Endoscopy;  Laterality: N/A;    ectopic pregnancy N/A        Review of patient's allergies indicates:  No Known Allergies    No outpatient medications have been marked as taking for the 24 encounter (Office Visit) with Dea Campbell FNP.       Social History     Socioeconomic History    Marital status: Single   Tobacco Use    Smoking status: Never     Passive exposure: Never    Smokeless tobacco: Never   Substance and Sexual Activity    Alcohol use: Yes     Comment: holidays    Drug use: Never    Sexual activity: Not Currently     Partners: Male     Social Determinants of Health     Financial Resource Strain: Low Risk  (2023)    Overall Financial Resource Strain (CARDIA)      Difficulty of Paying Living Expenses: Not hard at all   Food Insecurity: No Food Insecurity (7/17/2023)    Hunger Vital Sign     Worried About Running Out of Food in the Last Year: Never true     Ran Out of Food in the Last Year: Never true   Transportation Needs: No Transportation Needs (7/17/2023)    PRAPARE - Transportation     Lack of Transportation (Medical): No     Lack of Transportation (Non-Medical): No   Physical Activity: Inactive (7/17/2023)    Exercise Vital Sign     Days of Exercise per Week: 0 days     Minutes of Exercise per Session: 0 min   Stress: No Stress Concern Present (7/17/2023)    [employer] - Occupational Stress Questionnaire     Feeling of Stress : Not at all   Social Connections: Moderately Isolated (7/17/2023)    Social Connection and Isolation Panel [NHANES]     Frequency of Communication with Friends and Family: More than three times a week     Frequency of Social Gatherings with Friends and Family: Once a week     Attends Restoration Services: More than 4 times per year     Active Member of Clubs or Organizations: No     Attends Club or Organization Meetings: Never     Marital Status: Never    Housing Stability: Low Risk  (7/17/2023)    Housing Stability Vital Sign     Unable to Pay for Housing in the Last Year: No     Number of Places Lived in the Last Year: 1     Unstable Housing in the Last Year: No        Family History   Problem Relation Age of Onset    Diabetes type II Mother     Hypertension Mother         Patient Care Team:  Dea Campbell FNP as PCP - General (Family Medicine)  Stephanie Perry LPN as Care Coordinator      Subjective:       Review of Systems       See HPI for details    Constitutional: Denies Change in appetite. Denies Chills. Denies Fever. Denies Night sweats.  Eye: Denies Blurred vision. Denies Discharge. Denies Eye pain.  ENT: Denies Decreased hearing. Denies Sore throat. Denies Swollen  glands.  Respiratory: Denies Cough. Denies Shortness of breath. Denies Shortness of breath with exertion. Denies Wheezing.  Cardiovascular: DeniesChest pain at rest. Denies Chest pain with exertion. Denies Irregular heartbeat. Denies Palpitations. Denies Edema.  Gastrointestinal: Denies Abdominal pain. DeniesDiarrhea. Denies Nausea. Denies Vomiting. Denies Hematemesis or Hematochezia.  Genitourinary: Denies Dysuria. Denies Urinary frequency. Denies Urinary urgency. Denies Blood in urine.  Endocrine: Denies Cold intolerance. Denies Excessive thirst. Denies Heat intolerance. Denies Weight loss. Denies Weight gain.  Musculoskeletal: Denies Painful joints. Denies Weakness.  Integumentary: Denies Rash. Denies Itching. Denies Dry skin.  Neurologic: Denies Dizziness. Denies Fainting. Denies Headache.  Psychiatric: Denies Depression. Denies Anxiety. Denies Suicidal/Homicidal ideations.    All Other ROS: Negative except as stated in HPI.       Objective:     There were no vitals taken for this visit.    Physical Exam    Physical Exam: LIMITED DUE TO TELEMEDICINE RESTRICTIONS.  General: Alert and oriented, No acute distress.  Head: Normocephalic, Atraumatic.  Eye: Sclera non-icteric.  Neck/Thyroid:  Full range of motion.  Respiratory: Non-labored respirations, Symmetrical chest wall expansion.  Musculoskeletal: Normal range of motion.  Integumentary:  No visible suspicious lesions or rashes. No diaphoresis.   Neurologic: No focal deficits  Psychiatric: Normal interaction, Coherent speech, Euthymic mood, Appropriate affect       Assessment:       ICD-10-CM ICD-9-CM   1. Primary hypertension  I10 401.9   2. Pure hypertriglyceridemia  E78.1 272.1   3. Hypokalemia  E87.6 276.8   4. Positive FIT (fecal immunochemical test)  R19.5 792.1   5. Severe obesity (BMI 35.0-39.9) with comorbidity  E66.01 278.01   6. Asymptomatic microscopic hematuria  R31.21 599.72   7. Breast cancer screening by mammogram  Z12.31 V76.12   8. Adenomatous  rectal polyp  D12.8 211.4   9. Left leg pain  M79.605 729.5        Plan:     1. Primary hypertension  Low fat low salt diet and exercise. Cont Amlodipine, Lisinopril, HCTZ as prescribed.     2. Pure hypertriglyceridemia  Trigs 244 up from 214. Encouraged low fat diet and exercise. Pt recently back from a cruise which may be cause for elevation. Will repeat FLP in 6 months. Cont Atorvastatin as prescribed.     3. Hypokalemia  Pt admits to missing doses of potassium medication.  Cont Potassium medication as prescribed.  CMP in 3 months.     4. Positive FIT (fecal immunochemical test)  UTD colonoscopy done 1-26-23 polyps X 4 no malignancy- repeat in 3 years (1/2026).      5. Severe obesity (BMI 35.0-39.9) with comorbidity  Low fat diet and exercise encouraged. Education provided.      6. Asymptomatic microscopic hematuria  Pt followed in [facility] cl. Keep appts.     7. Breast cancer screening by mammogram  UTD MMG.     8. Adenomatous rectal polyp   UTD colonoscopy done 1-26-23 polyps X 4 no malignancy- repeat in 3 years (1/2026).           Orders Placed This Encounter   Procedures    Comprehensive Metabolic Panel     Standing Status:   Future     Standing Expiration Date:   5/31/2024    Lipid Panel     Standing Status:   Future     Standing Expiration Date:   8/30/2024          Follow up in about 3 months (around 5/21/2024) for with labs 1 week prior to appt. . In addition to their scheduled follow up, the patient has also been instructed to follow up on as needed basis.       Video Time Documentation:  Spent 16 minutes with patient  discussed health concerns. More than 50% of this time was spent in counseling and coordination of care.

## 2024-04-17 ENCOUNTER — OFFICE VISIT (OUTPATIENT)
Dept: UROLOGY | Facility: CLINIC | Age: 64
End: 2024-04-17
Payer: COMMERCIAL

## 2024-04-17 VITALS
DIASTOLIC BLOOD PRESSURE: 81 MMHG | HEART RATE: 57 BPM | HEIGHT: 63 IN | SYSTOLIC BLOOD PRESSURE: 131 MMHG | OXYGEN SATURATION: 100 % | WEIGHT: 208.38 LBS | TEMPERATURE: 98 F | RESPIRATION RATE: 20 BRPM | BODY MASS INDEX: 36.92 KG/M2

## 2024-04-17 DIAGNOSIS — N20.0 RENAL CALCULUS: ICD-10-CM

## 2024-04-17 DIAGNOSIS — R31.9 HEMATURIA, UNSPECIFIED TYPE: Primary | ICD-10-CM

## 2024-04-17 LAB
BILIRUB SERPL-MCNC: NORMAL MG/DL
BLOOD URINE, POC: NORMAL
COLOR, POC UA: YELLOW
GLUCOSE UR QL STRIP: NORMAL
KETONES UR QL STRIP: NORMAL
LEUKOCYTE ESTERASE URINE, POC: NORMAL
NITRITE, POC UA: NORMAL
PH, POC UA: 6
PROTEIN, POC: >=300
SPECIFIC GRAVITY, POC UA: 1.02
UROBILINOGEN, POC UA: 0.2

## 2024-04-17 PROCEDURE — 99213 OFFICE O/P EST LOW 20 MIN: CPT | Mod: S$PBB,,, | Performed by: NURSE PRACTITIONER

## 2024-04-17 PROCEDURE — 3075F SYST BP GE 130 - 139MM HG: CPT | Mod: CPTII,,, | Performed by: NURSE PRACTITIONER

## 2024-04-17 PROCEDURE — 4010F ACE/ARB THERAPY RXD/TAKEN: CPT | Mod: CPTII,,, | Performed by: NURSE PRACTITIONER

## 2024-04-17 PROCEDURE — 1160F RVW MEDS BY RX/DR IN RCRD: CPT | Mod: CPTII,,, | Performed by: NURSE PRACTITIONER

## 2024-04-17 PROCEDURE — 1159F MED LIST DOCD IN RCRD: CPT | Mod: CPTII,,, | Performed by: NURSE PRACTITIONER

## 2024-04-17 PROCEDURE — 81001 URINALYSIS AUTO W/SCOPE: CPT | Mod: PBBFAC | Performed by: NURSE PRACTITIONER

## 2024-04-17 PROCEDURE — 88108 CYTOPATH CONCENTRATE TECH: CPT | Mod: TC | Performed by: NURSE PRACTITIONER

## 2024-04-17 PROCEDURE — 99215 OFFICE O/P EST HI 40 MIN: CPT | Mod: PBBFAC,25 | Performed by: NURSE PRACTITIONER

## 2024-04-17 PROCEDURE — 3008F BODY MASS INDEX DOCD: CPT | Mod: CPTII,,, | Performed by: NURSE PRACTITIONER

## 2024-04-17 PROCEDURE — 3044F HG A1C LEVEL LT 7.0%: CPT | Mod: CPTII,,, | Performed by: NURSE PRACTITIONER

## 2024-04-17 PROCEDURE — 3079F DIAST BP 80-89 MM HG: CPT | Mod: CPTII,,, | Performed by: NURSE PRACTITIONER

## 2024-04-17 NOTE — LETTER
April 17, 2024      [facility]  [address] [facility] [address]  Phone: [phone]       Patient: Laina Macias   YOB: 1960  Date of Visit: 04/17/2024    To Whom It May Concern:    LASHAUN Macias  was at [facility] on 04/17/2024. The patient may return to work/school on 04/18/2024.  If you have any questions or concerns, or if I can be of further assistance, please do not hesitate to contact me.    Sincerely,    Dena Madrid LPN

## 2024-04-17 NOTE — PROGRESS NOTES
Placed in room. Seen by Josue Pires NP. Spoke with patient.  U/A cytology sent to lab .F/U  in 8 days  with a virtual appointment. RTC in 6 months with a renal U/S.

## 2024-04-17 NOTE — PROGRESS NOTES
Chief Complaint:   Chief Complaint   Patient presents with    Hematuria       HPI:  Patient is a 63 year old female here for hematuria and kidney stones.  Patient seen by PCP Cheyanne Rider NP with persistent microscopic hematuria.  Patient also has a history of kidney stones in the past she was seen by Dr. Auguste with a renal ultrasound in 2021 that noted mild hydronephrosis right renal cysts and diminishing renal stones in the left kidney.  Today patient denies any gross hematuria, nocturia, urinary frequency, urinary urgency, dysuria, urinary incontinence, urinary retention.  Patient underwent a recent cystoscope with Dr. Chacon on 10/18/2023 negative results from cystoscope however will monitor hematuria with urine cytology, renal stones and cysts with renal ultrasound in 6 months.  Allergies:  Review of patient's allergies indicates:  No Known Allergies    Medications:  Current Outpatient Medications   Medication Sig Dispense Refill    amLODIPine (NORVASC) 10 MG tablet Take 1 tablet (10 mg total) by mouth once daily. 90 tablet 1    atorvastatin (LIPITOR) 10 MG tablet Take 1 tablet (10 mg total) by mouth every evening. 90 tablet 1    conjugated estrogens (PREMARIN) vaginal cream Place 0.5 g vaginally twice a week. Apply dime size to gloved finger and apply to vaginal walls nightly 2x/week. 30 g 6    hydroCHLOROthiazide (HYDRODIURIL) 25 MG tablet TAKE ONE TABLET BY MOUTH EVERY DAY 90 tablet 1    lisinopriL 10 MG tablet Take 1 tablet (10 mg total) by mouth once daily. 90 tablet 1    meloxicam (MOBIC) 7.5 MG tablet Take 1 tablet (7.5 mg total) by mouth 2 (two) times daily as needed for Pain. 60 tablet 3    potassium chloride SA (K-DUR,KLOR-CON) 20 MEQ tablet Take 1 tablet (20 mEq total) by mouth once daily. 30 tablet 4    tiZANidine (ZANAFLEX) 4 MG tablet Take 1 tablet (4 mg total) by mouth every 6 (six) hours as needed (muscle spasm). 90 tablet 1     No current facility-administered medications for this visit.        Review of Systems:  General: No fever, chills, fatigability, or weight loss.  Skin: No rashes, itching, or changes in color or texture of skin.  Chest: Denies NYE, cyanosis, wheezing, cough, and sputum production.  Abdomen: Appetite fine. No weight loss. Denies diarrhea, abdominal pain, hematemesis, or blood in stool.  Musculoskeletal: No joint stiffness or swelling. Denies back pain.  : As above.  All other review of systems negative.    PMH:  Past Medical History:   Diagnosis Date    Anemia     Diverticulosis     Hypertension     Personal history of colonic polyps        PSH:  Past Surgical History:   Procedure Laterality Date     SECTION      COLONOSCOPY  10/21/2019    COLONOSCOPY, WITH POLYPECTOMY USING SNARE N/A 2023    Procedure: COLONOSCOPY, WITH POLYPECTOMY USING SNARE;  Surgeon: Herbert Hurtado MD;  Location: [facility] ENDOSCOPY;  Service: Endoscopy;  Laterality: N/A;    ectopic pregnancy N/A        FamHx:  Family History   Problem Relation Name Age of Onset    Diabetes type II Mother      Hypertension Mother         SocHx:  Social History     Socioeconomic History    Marital status: Single   Tobacco Use    Smoking status: Never     Passive exposure: Never    Smokeless tobacco: Never   Substance and Sexual Activity    Alcohol use: Yes     Comment: holidays    Drug use: Never    Sexual activity: Not Currently     Partners: Male     Social Determinants of Health     Financial Resource Strain: Low Risk  (2023)    Overall Financial Resource Strain (CARDIA)     Difficulty of Paying Living Expenses: Not hard at all   Food Insecurity: No Food Insecurity (2023)    Hunger Vital Sign     Worried About Running Out of Food in the Last Year: Never true     Ran Out of Food in the Last Year: Never true   Transportation Needs: No Transportation Needs (2023)    PRAPARE - Transportation     Lack of Transportation (Medical): No     Lack of Transportation (Non-Medical): No   Physical Activity:  Inactive (7/17/2023)    Exercise Vital Sign     Days of Exercise per Week: 0 days     Minutes of Exercise per Session: 0 min   Stress: No Stress Concern Present (7/17/2023)    [place] [place] [employer] - Occupational Stress Questionnaire     Feeling of Stress : Not at all   Social Connections: Moderately Isolated (7/17/2023)    Social Connection and Isolation Panel [NHANES]     Frequency of Communication with Friends and Family: More than three times a week     Frequency of Social Gatherings with Friends and Family: Once a week     Attends Adventist Services: More than 4 times per year     Active Member of Clubs or Organizations: No     Attends Club or Organization Meetings: Never     Marital Status: Never    Housing Stability: Low Risk  (7/17/2023)    Housing Stability Vital Sign     Unable to Pay for Housing in the Last Year: No     Number of Places Lived in the Last Year: 1     Unstable Housing in the Last Year: No       Physical Exam:  Vitals:    04/17/24 1035   BP: 131/81   Pulse: (!) 57   Resp: 20   Temp: 97.9 °F (36.6 °C)     General: A&Ox3, no apparent distress, no deformities  Neck: No masses, normal thyroid  Lungs: CTA shannon, no use of accessory muscles  Heart: RRR, no arrhythmias  Abdomen: Soft, NT, ND, no masses, no hernias, no hepatosplenomegaly  Lymphatic: Neck and groin nodes negative  Skin: The skin is warm and dry. No jaundice.  Ext: No c/c/e.    Urinalysis:  Results for orders placed or performed in visit on 04/17/24   POCT URINE DIPSTICK WITH MICROSCOPE, AUTOMATED   Result Value Ref Range    Color, UA Yellow     Spec Grav UA 1.025     pH, UA 6.0     WBC, UA neg     Nitrite, UA neg     Protein, POC >=300     Glucose, UA neg     Ketones, UA neg     Urobilinogen, UA 0.2     Bilirubin, POC neg     Blood, UA moderate    Microscopic urinalysis revealed moderate RBCs, negative WBCs nitrites.          Impression:  1. Hematuria, unspecified type  - POCT URINE DIPSTICK WITH MICROSCOPE,  AUTOMATED      Plan:  Instructed patient RTC 6 months with renal ultrasound for renal cyst and kidney stones we will perform a urine cytology and notify patient results when completed.  Increase fluid intake, limit salt in diet, limit protein to 30%, intake adequate calcium, decrease brand, soy, Beets, Almonds, Rhubard, Buckwheat flour, baked potato, raspberry, potato chips Spinach, Miso, Tahini, sesame, Swiss Chard. Instructed patient on Avoiding bladder irritants, such as alcohol, citrus drinks or foods,salty foods, energy drinks, spicy foods, caffeine, sodas.

## 2024-04-18 LAB
ESTROGEN SERPL-MCNC: NORMAL PG/ML
INSULIN SERPL-ACNC: NORMAL U[IU]/ML
LAB AP CLINICAL INFORMATION: NORMAL
LAB AP GROSS DESCRIPTION: NORMAL
LAB AP URINE CYTOLOGY INTERPRETATION SPECIMEN 1: NORMAL

## 2024-04-25 ENCOUNTER — OFFICE VISIT (OUTPATIENT)
Dept: UROLOGY | Facility: CLINIC | Age: 64
End: 2024-04-25
Payer: COMMERCIAL

## 2024-04-25 DIAGNOSIS — N20.0 RENAL CALCULUS: ICD-10-CM

## 2024-04-25 DIAGNOSIS — R31.9 HEMATURIA, UNSPECIFIED TYPE: Primary | ICD-10-CM

## 2024-04-25 PROCEDURE — 99213 OFFICE O/P EST LOW 20 MIN: CPT | Mod: 95,,, | Performed by: NURSE PRACTITIONER

## 2024-04-25 PROCEDURE — 4010F ACE/ARB THERAPY RXD/TAKEN: CPT | Mod: CPTII,95,, | Performed by: NURSE PRACTITIONER

## 2024-04-25 PROCEDURE — 3044F HG A1C LEVEL LT 7.0%: CPT | Mod: CPTII,95,, | Performed by: NURSE PRACTITIONER

## 2024-04-25 NOTE — PROGRESS NOTES
Established Patient - Audio Only Telehealth Visit     The patient location is: Home  The chief complaint leading to consultation is: Lab results  Visit type: Virtual visit with audio only (telephone)  Total time spent with patient:  25  minutes     The reason for the audio only service rather than synchronous audio virtual visit was related to technical difficulties or patient preference/necessity.     Each patient to whom I provide medical services by telemedicine is:  (1) informed of the relationship between the physician and patient and the respective role of any other health care provider with respect to management of the patient; and (2) notified that they may decline to receive medical services by telemedicine and may withdraw from such care at any time. Patient verbally consented to receive this service via voice-only telephone call.     This service was not originating from a related E/M service provided within the previous 7 days nor will  to an E/M service or procedure within the next 24 hours or my soonest available appointment.  Prevailing standard of care was able to be met in this audio-only visit.    Chief Complaint:  Lab results      HPI:  Patient is a 63-year-old female on this audio virtual visit due to failed video virtual visit for lab results of urine cytology.  Patient seen in the past for persistent microscopic hematuria underwent a cystoscope via Dr. Chacon on 10/18/2023 which was negative.  Plan with the follow patient with a KUB every 6 months for renal calculi and urine cytology.  Urine cytology negative.  Instructed patient to keep appointment on October 16th 2024 with a renal ultrasound for kidney stones.    Allergies:  Review of patient's allergies indicates:  No Known Allergies    Medications:  Current Outpatient Medications   Medication Sig Dispense Refill    amLODIPine (NORVASC) 10 MG tablet Take 1 tablet (10 mg total) by mouth once daily. 90 tablet 1    atorvastatin  (LIPITOR) 10 MG tablet Take 1 tablet (10 mg total) by mouth every evening. 90 tablet 1    conjugated estrogens (PREMARIN) vaginal cream Place 0.5 g vaginally twice a week. Apply dime size to gloved finger and apply to vaginal walls nightly 2x/week. 30 g 6    hydroCHLOROthiazide (HYDRODIURIL) 25 MG tablet TAKE ONE TABLET BY MOUTH EVERY DAY 90 tablet 1    lisinopriL 10 MG tablet Take 1 tablet (10 mg total) by mouth once daily. 90 tablet 1    meloxicam (MOBIC) 7.5 MG tablet Take 1 tablet (7.5 mg total) by mouth 2 (two) times daily as needed for Pain. 60 tablet 3    potassium chloride SA (K-DUR,KLOR-CON) 20 MEQ tablet Take 1 tablet (20 mEq total) by mouth once daily. 30 tablet 4    tiZANidine (ZANAFLEX) 4 MG tablet Take 1 tablet (4 mg total) by mouth every 6 (six) hours as needed (muscle spasm). 90 tablet 1     No current facility-administered medications for this visit.       Review of Systems:  General: No fever, chills, fatigability, or weight loss.  Skin: No rashes, itching, or changes in color or texture of skin.  Chest: Denies NYE, cyanosis, wheezing, cough, and sputum production.  Abdomen: Appetite fine. No weight loss. Denies diarrhea, abdominal pain, hematemesis, or blood in stool.  Musculoskeletal: No joint stiffness or swelling. Denies back pain.  : As above.  All other review of systems negative.    PMH:  Past Medical History:   Diagnosis Date    Anemia     Diverticulosis     Hypertension     Personal history of colonic polyps        PSH:  Past Surgical History:   Procedure Laterality Date     SECTION      COLONOSCOPY  10/21/2019    COLONOSCOPY, WITH POLYPECTOMY USING SNARE N/A 2023    Procedure: COLONOSCOPY, WITH POLYPECTOMY USING SNARE;  Surgeon: Herbert Hurtado MD;  Location: [facility] ENDOSCOPY;  Service: Endoscopy;  Laterality: N/A;    ectopic pregnancy N/A        FamHx:  Family History   Problem Relation Name Age of Onset    Diabetes type II Mother      Hypertension Mother          SocHx:  Social History     Socioeconomic History    Marital status: Single   Tobacco Use    Smoking status: Never     Passive exposure: Never    Smokeless tobacco: Never   Substance and Sexual Activity    Alcohol use: Yes     Comment: holidays    Drug use: Never    Sexual activity: Not Currently     Partners: Male     Social Determinants of Health     Financial Resource Strain: Low Risk  (7/17/2023)    Overall Financial Resource Strain (CARDIA)     Difficulty of Paying Living Expenses: Not hard at all   Food Insecurity: No Food Insecurity (7/17/2023)    Hunger Vital Sign     Worried About Running Out of Food in the Last Year: Never true     Ran Out of Food in the Last Year: Never true   Transportation Needs: No Transportation Needs (7/17/2023)    PRAPARE - Transportation     Lack of Transportation (Medical): No     Lack of Transportation (Non-Medical): No   Physical Activity: Inactive (7/17/2023)    Exercise Vital Sign     Days of Exercise per Week: 0 days     Minutes of Exercise per Session: 0 min   Stress: No Stress Concern Present (7/17/2023)    [employer] [place] [employer] - Occupational Stress Questionnaire     Feeling of Stress : Not at all   Social Connections: Moderately Isolated (7/17/2023)    Social Connection and Isolation Panel [NHANES]     Frequency of Communication with Friends and Family: More than three times a week     Frequency of Social Gatherings with Friends and Family: Once a week     Attends Jew Services: More than 4 times per year     Active Member of Clubs or Organizations: No     Attends Club or Organization Meetings: Never     Marital Status: Never    Housing Stability: Low Risk  (7/17/2023)    Housing Stability Vital Sign     Unable to Pay for Housing in the Last Year: No     Number of Places Lived in the Last Year: 1     Unstable Housing in the Last Year: No       Physical Exam:  There were no vitals filed for this visit.    Labs:Cytology, Urine:  [id number]  Order: [id number]  Status: Final result       Visible to patient: Yes (not seen)       Next appt: 05/22/2024 at 07:00 AM in Internal Medicine (ROSA Callahan)       Dx: Hematuria, unspecified type    0 Result Notes       Component Ref Range & Units    Case Report   [facility]                       Case: [id number]                                 Authorizing Provider:  Jatinder Pires NP           Collected:           04/17/2024 01:30 PM           Ordering Location:     [facility] -       Received:            04/17/2024 01:54 PM                                  Urology                                                                       Pathologist:           Larisa Santiago MD                                                         Specimen:    Urine, Bladder                                                                            Final Diagnosis      Urine, bladder:  - Negative for malignancy.        Electronically signed by Larisa Santiago MD on 4/18/2024 at 1305   Clinical Information     Hematuria   Gross Description     1. Urine, Bladder, :   Received fresh is 75 ml of clear yellow fluid submitted for cytospin preparation.    Specimen 1 Interpretation   Negative for high grade urothelial carcinoma                   Impression:  Microscopic hematuria  Kidney stones    Plan:  Instructed patient to keep appointment in 6 months with renal ultrasound.  Instructed patient to notify clinic if any abnormal urologic symptoms occur between now and next appointment.

## 2024-05-10 DIAGNOSIS — E87.6 HYPOKALEMIA: Primary | ICD-10-CM

## 2024-05-11 RX ORDER — POTASSIUM CHLORIDE 20 MEQ/1
20 TABLET, EXTENDED RELEASE ORAL
Qty: 90 TABLET | Refills: 1 | Status: SHIPPED | OUTPATIENT
Start: 2024-05-11

## 2024-05-21 ENCOUNTER — LAB VISIT (OUTPATIENT)
Dept: LAB | Facility: HOSPITAL | Age: 64
End: 2024-05-21
Attending: NURSE PRACTITIONER
Payer: COMMERCIAL

## 2024-05-21 DIAGNOSIS — R31.9 HEMATURIA, UNSPECIFIED TYPE: ICD-10-CM

## 2024-05-21 DIAGNOSIS — Z87.442 HISTORY OF KIDNEY STONES: ICD-10-CM

## 2024-05-21 DIAGNOSIS — I10 PRIMARY HYPERTENSION: ICD-10-CM

## 2024-05-21 LAB
ALBUMIN SERPL-MCNC: 3.5 G/DL (ref 3.4–4.8)
ALBUMIN/GLOB SERPL: 0.9 RATIO (ref 1.1–2)
ALP SERPL-CCNC: 46 UNIT/L (ref 40–150)
ALT SERPL-CCNC: 14 UNIT/L (ref 0–55)
ANION GAP SERPL CALC-SCNC: 11 MEQ/L
AST SERPL-CCNC: 17 UNIT/L (ref 5–34)
BILIRUB SERPL-MCNC: 0.4 MG/DL
BUN SERPL-MCNC: 19.9 MG/DL (ref 9.8–20.1)
CALCIUM SERPL-MCNC: 9.7 MG/DL (ref 8.4–10.2)
CHLORIDE SERPL-SCNC: 107 MMOL/L (ref 98–107)
CO2 SERPL-SCNC: 25 MMOL/L (ref 23–31)
CREAT SERPL-MCNC: 0.76 MG/DL (ref 0.55–1.02)
CREAT/UREA NIT SERPL: 26
GFR SERPLBLD CREATININE-BSD FMLA CKD-EPI: >60 ML/MIN/1.73/M2
GLOBULIN SER-MCNC: 4 GM/DL (ref 2.4–3.5)
GLUCOSE SERPL-MCNC: 108 MG/DL (ref 82–115)
POTASSIUM SERPL-SCNC: 3.1 MMOL/L (ref 3.5–5.1)
PROT SERPL-MCNC: 7.5 GM/DL (ref 5.8–7.6)
SODIUM SERPL-SCNC: 143 MMOL/L (ref 136–145)

## 2024-05-21 PROCEDURE — 80053 COMPREHEN METABOLIC PANEL: CPT

## 2024-05-21 PROCEDURE — 36415 COLL VENOUS BLD VENIPUNCTURE: CPT

## 2024-05-22 ENCOUNTER — OFFICE VISIT (OUTPATIENT)
Dept: INTERNAL MEDICINE | Facility: CLINIC | Age: 64
End: 2024-05-22
Payer: COMMERCIAL

## 2024-05-22 VITALS
RESPIRATION RATE: 18 BRPM | TEMPERATURE: 98 F | DIASTOLIC BLOOD PRESSURE: 73 MMHG | SYSTOLIC BLOOD PRESSURE: 116 MMHG | HEART RATE: 62 BPM | BODY MASS INDEX: 38.28 KG/M2 | HEIGHT: 62 IN | WEIGHT: 208 LBS

## 2024-05-22 DIAGNOSIS — R19.5 POSITIVE FIT (FECAL IMMUNOCHEMICAL TEST): ICD-10-CM

## 2024-05-22 DIAGNOSIS — I10 PRIMARY HYPERTENSION: Primary | ICD-10-CM

## 2024-05-22 DIAGNOSIS — R31.21 ASYMPTOMATIC MICROSCOPIC HEMATURIA: ICD-10-CM

## 2024-05-22 DIAGNOSIS — N89.8 VAGINAL IRRITATION: ICD-10-CM

## 2024-05-22 DIAGNOSIS — Z12.31 BREAST CANCER SCREENING BY MAMMOGRAM: ICD-10-CM

## 2024-05-22 DIAGNOSIS — E66.01 SEVERE OBESITY (BMI 35.0-39.9) WITH COMORBIDITY: ICD-10-CM

## 2024-05-22 DIAGNOSIS — E87.6 HYPOKALEMIA: ICD-10-CM

## 2024-05-22 DIAGNOSIS — D12.8 ADENOMATOUS RECTAL POLYP: ICD-10-CM

## 2024-05-22 DIAGNOSIS — Z00.00 WELL ADULT EXAM: ICD-10-CM

## 2024-05-22 DIAGNOSIS — M54.30 SCIATICA, UNSPECIFIED LATERALITY: ICD-10-CM

## 2024-05-22 DIAGNOSIS — E78.1 PURE HYPERTRIGLYCERIDEMIA: ICD-10-CM

## 2024-05-22 PROCEDURE — 3008F BODY MASS INDEX DOCD: CPT | Mod: CPTII,,, | Performed by: NURSE PRACTITIONER

## 2024-05-22 PROCEDURE — 3074F SYST BP LT 130 MM HG: CPT | Mod: CPTII,,, | Performed by: NURSE PRACTITIONER

## 2024-05-22 PROCEDURE — 1159F MED LIST DOCD IN RCRD: CPT | Mod: CPTII,,, | Performed by: NURSE PRACTITIONER

## 2024-05-22 PROCEDURE — 4010F ACE/ARB THERAPY RXD/TAKEN: CPT | Mod: CPTII,,, | Performed by: NURSE PRACTITIONER

## 2024-05-22 PROCEDURE — 99214 OFFICE O/P EST MOD 30 MIN: CPT | Mod: PBBFAC | Performed by: NURSE PRACTITIONER

## 2024-05-22 PROCEDURE — 3044F HG A1C LEVEL LT 7.0%: CPT | Mod: CPTII,,, | Performed by: NURSE PRACTITIONER

## 2024-05-22 PROCEDURE — 99214 OFFICE O/P EST MOD 30 MIN: CPT | Mod: S$PBB,,, | Performed by: NURSE PRACTITIONER

## 2024-05-22 PROCEDURE — 3078F DIAST BP <80 MM HG: CPT | Mod: CPTII,,, | Performed by: NURSE PRACTITIONER

## 2024-05-22 RX ORDER — FLUCONAZOLE 150 MG/1
150 TABLET ORAL ONCE
Qty: 1 TABLET | Refills: 0 | Status: SHIPPED | OUTPATIENT
Start: 2024-05-22 | End: 2024-05-22

## 2024-05-22 RX ORDER — LISINOPRIL 20 MG/1
20 TABLET ORAL DAILY
Qty: 90 TABLET | Refills: 1 | Status: SHIPPED | OUTPATIENT
Start: 2024-05-22 | End: 2025-05-22

## 2024-05-22 RX ORDER — ATORVASTATIN CALCIUM 10 MG/1
10 TABLET, FILM COATED ORAL NIGHTLY
Qty: 90 TABLET | Refills: 1 | Status: SHIPPED | OUTPATIENT
Start: 2024-05-22

## 2024-05-22 RX ORDER — HYDROCHLOROTHIAZIDE 12.5 MG/1
TABLET ORAL
Qty: 90 TABLET | Refills: 1 | Status: SHIPPED | OUTPATIENT
Start: 2024-05-22

## 2024-05-22 RX ORDER — AMLODIPINE BESYLATE 10 MG/1
10 TABLET ORAL DAILY
Qty: 90 TABLET | Refills: 1 | Status: SHIPPED | OUTPATIENT
Start: 2024-05-22

## 2024-05-22 RX ORDER — METHYLPREDNISOLONE 4 MG/1
TABLET ORAL
Qty: 21 EACH | Refills: 0 | Status: SHIPPED | OUTPATIENT
Start: 2024-05-22 | End: 2024-06-12

## 2024-05-22 NOTE — PROGRESS NOTES
Patient ID: [id number]     Chief Complaint: LAB RESULTS        HPI:     HPI      Laina Macias is a 63 y.o. female here today for a follow up.         Immunizations:   Immunization History   Administered Date(s) Administered    COVID-19 MRNA, LN-S PF (MODERNA HALF 0.25 ML DOSE) 2021, 2022    COVID-19, MRNA, LN-S, PF (MODERNA FULL 0.5 ML DOSE) 2021, 2021    Influenza - Quadrivalent - PF *Preferred* (6 months and older) 2023, 2023    Tdap 2017        -------------------------------------    Anemia    Diverticulosis    Hypertension    Personal history of colonic polyps        Past Surgical History:   Procedure Laterality Date     SECTION      COLONOSCOPY  10/21/2019    COLONOSCOPY, WITH POLYPECTOMY USING SNARE N/A 2023    Procedure: COLONOSCOPY, WITH POLYPECTOMY USING SNARE;  Surgeon: Herbert Hurtado MD;  Location: [facility] ENDOSCOPY;  Service: Endoscopy;  Laterality: N/A;    ectopic pregnancy N/A        Review of patient's allergies indicates:  No Known Allergies    Current Outpatient Medications   Medication Instructions    amLODIPine (NORVASC) 10 mg, Oral, Daily    atorvastatin (LIPITOR) 10 mg, Oral, Nightly    conjugated estrogens (PREMARIN) 0.5 g, Vaginal, Twice weekly, Apply dime size to gloved finger and apply to vaginal walls nightly 2x/week.    hydroCHLOROthiazide (HYDRODIURIL) 25 MG tablet TAKE ONE TABLET BY MOUTH EVERY DAY    lisinopriL 10 mg, Oral, Daily    meloxicam (MOBIC) 7.5 mg, Oral, 2 times daily PRN    potassium chloride SA (K-DUR,KLOR-CON) 20 MEQ tablet 20 mEq, Oral    tiZANidine (ZANAFLEX) 4 mg, Oral, Every 6 hours PRN       Social History     Socioeconomic History    Marital status: Single   Tobacco Use    Smoking status: Never     Passive exposure: Never    Smokeless tobacco: Never   Substance and Sexual Activity    Alcohol use: Yes     Comment: holidays    Drug use: Never    Sexual activity: Not Currently     Partners: Male     Social  Determinants of Health     Financial Resource Strain: Low Risk  (7/17/2023)    Overall Financial Resource Strain (CARDIA)     Difficulty of Paying Living Expenses: Not hard at all   Food Insecurity: No Food Insecurity (7/17/2023)    Hunger Vital Sign     Worried About Running Out of Food in the Last Year: Never true     Ran Out of Food in the Last Year: Never true   Transportation Needs: No Transportation Needs (7/17/2023)    PRAPARE - Transportation     Lack of Transportation (Medical): No     Lack of Transportation (Non-Medical): No   Physical Activity: Inactive (7/17/2023)    Exercise Vital Sign     Days of Exercise per Week: 0 days     Minutes of Exercise per Session: 0 min   Stress: No Stress Concern Present (7/17/2023)    [employer] [place] [employer] - Occupational Stress Questionnaire     Feeling of Stress : Not at all   Housing Stability: Low Risk  (7/17/2023)    Housing Stability Vital Sign     Unable to Pay for Housing in the Last Year: No     Number of Places Lived in the Last Year: 1     Unstable Housing in the Last Year: No        Family History   Problem Relation Name Age of Onset    Diabetes type II Mother      Hypertension Mother          Patient Care Team:  Dea Campbell FNP as PCP - General (Family Medicine)  Stephanie Perry LPN as Care Coordinator     Subjective:     Review of Systems     See HPI for details    Constitutional: Denies Change in appetite. Denies Chills. Denies Fever. Denies Night sweats.  Eye: Denies Blurred vision. Denies Discharge. Denies Eye pain.  ENT: Denies Decreased hearing. Denies Sore throat. Denies Swollen glands.  Respiratory: Denies Cough. Denies Shortness of breath. Denies Shortness of breath with exertion. Denies Wheezing.  Cardiovascular: DeniesChest pain at rest. Denies Chest pain with exertion. Denies Irregular heartbeat. Denies Palpitations. Denies Edema.  Gastrointestinal: Denies Abdominal pain. DeniesDiarrhea. Denies Nausea. Denies  "Vomiting. Denies Hematemesis or Hematochezia.  Genitourinary: Denies Dysuria. Denies Urinary frequency. Denies Urinary urgency. Denies Blood in urine.  Endocrine: Denies Cold intolerance. Denies Excessive thirst. Denies Heat intolerance. Denies Weight loss. Denies Weight gain.  Musculoskeletal: Denies Painful joints. Denies Weakness.  Integumentary: Denies Rash. Denies Itching. Denies Dry skin.  Neurologic: Denies Dizziness. Denies Fainting. Denies Headache.  Psychiatric: Denies Depression. Denies Anxiety. Denies Suicidal/Homicidal ideations.    All Other ROS: Negative except as stated in HPI.       Objective:     Visit Vitals  /73 (BP Location: Right arm, Patient Position: Sitting, BP Method: Large (Automatic))   Pulse 62   Temp 98.1 °F (36.7 °C) (Oral)   Resp 18   Ht 5' 2" (1.575 m)   Wt 94.3 kg (208 lb)   BMI 38.04 kg/m²       Physical Exam    General: Alert and oriented, No acute distress.  Head: Normocephalic, Atraumatic.  Eye: Pupils are equal, round and reactive to light, Extraocular movements are intact, Sclera non-icteric.  Ears/Nose/Throat: Normal, Mucosa moist,Clear.  Neck/Thyroid: Supple, Non-tender, No carotid bruit, No lymphadenopathy, No JVD, Full range of motion.  Respiratory: Clear to auscultation bilaterally; No wheezes, rales or rhonchi,Non-labored respirations, Symmetrical chest wall expansion.  Cardiovascular: Regular rate and rhythm, S1/S2 normal, No murmurs, rubs or gallops.  Gastrointestinal: Soft, Non-tender, Non-distended, Normal bowel sounds, No palpable organomegaly.  Musculoskeletal: Normal range of motion.  Integumentary: Warm, Dry, Intact, No suspicious lesions or rashes.  Extremities: No clubbing, cyanosis or edema  Neurologic: No focal deficits, Cranial Nerves II-XII are grossly intact, Motor strength normal upper and lower extremities, Sensory exam intact.  Psychiatric: Normal interaction, Coherent speech, Euthymic mood, Appropriate affect       Labs Reviewed: "     Chemistry:  Lab Results   Component Value Date     05/21/2024    K 3.1 (L) 05/21/2024    CHLORIDE 107 05/21/2024    BUN 19.9 05/21/2024    CREATININE 0.76 05/21/2024    EGFRNORACEVR >60 05/21/2024    GLUCOSE 108 05/21/2024    CALCIUM 9.7 05/21/2024    ALKPHOS 46 05/21/2024    LABPROT 7.5 05/21/2024    ALBUMIN 3.5 05/21/2024    BILIDIR 0.1 05/08/2021    IBILI 0.30 05/08/2021    AST 17 05/21/2024    ALT 14 05/21/2024    CUWTPSYD75BT 39.3 05/08/2021        Lab Results   Component Value Date    HGBA1C 5.5 02/21/2024        Hematology:  Lab Results   Component Value Date    WBC 6.72 02/21/2024    HGB 13.9 02/21/2024    HCT 38.5 02/21/2024     02/21/2024       Lipid Panel:  Lab Results   Component Value Date    CHOL 145 02/21/2024    HDL 38 02/21/2024    LDL 58.00 02/21/2024    TRIG 244 (H) 02/21/2024    TOTALCHOLEST 4 02/21/2024        Urine:  Lab Results   Component Value Date    COLORUA Light-Yellow 08/08/2023    APPEARANCEUA Clear 08/08/2023    SGUA 1.016 08/08/2023    PHUA 6.0 08/08/2023    PROTEINUA 2+ (A) 08/08/2023    GLUCOSEUA Normal 08/08/2023    KETONESUA Negative 08/08/2023    BLOODUA 2+ (A) 08/08/2023    NITRITESUA Negative 08/08/2023    LEUKOCYTESUR Negative 08/08/2023    RBCUA 6-10 (A) 08/08/2023    WBCUA 0-5 08/08/2023    BACTERIA None Seen 08/08/2023    SQEPUA Trace (A) 08/08/2023    HYALINECASTS None Seen 08/08/2023        Assessment:       ICD-10-CM ICD-9-CM   1. Primary hypertension  I10 401.9   2. Pure hypertriglyceridemia  E78.1 272.1   3. Hypokalemia  E87.6 276.8   4. Positive FIT (fecal immunochemical test)  R19.5 792.1   5. Severe obesity (BMI 35.0-39.9) with comorbidity  E66.01 278.01   6. Asymptomatic microscopic hematuria  R31.21 599.72   7. Breast cancer screening by mammogram  Z12.31 V76.12   8. Adenomatous rectal polyp  D12.8 211.4   9. Well adult exam  Z00.00 V70.0        Plan:     1. Primary hypertension  Low fat low salt diet and exercise. Cont Amlodipine, Lisinopril,  HCTZ as prescribed.     2. Pure hypertriglyceridemia  Encouraged low fat diet and exercise. Will repeat FLP in Aug 2024. Cont Atorvastatin as prescribed.     3. Hypokalemia  Cont Potassium medication as prescribed. BMP in 1 month. Decreased HCTZ to 12.5 mg 1 tab po daily and Increase Lisinopril 10 mg to 20 mg 1 tab po daily. RTC in 1 month for BP check and repeat BMP for potassium repeat.     4. Positive FIT (fecal immunochemical test)  UTD colonoscopy done 1-26-23 polyps X 4 no malignancy- repeat in 3 years (1/2026).      5. Severe obesity (BMI 35.0-39.9) with comorbidity  Low fat diet and exercise encouraged. Education provided.     6. Asymptomatic microscopic hematuria  Pt followed in Uro cl. Keep appts.     7. Breast cancer screening by mammogram  UTD MMG.     8. Adenomatous rectal polyp   UTD colonoscopy done 1-26-23 polyps X 4 no malignancy- repeat in 3 years (1/2026). UTD colonoscopy done 1-26-23 polyps X 4 no malignancy- repeat in 3 years (1/2026).     9. Well adult exam  Labs in 3 months. Keep GYN cl appt. UTD MMG.          Follow up in about 3 months (around 8/22/2024) for with labs 1 week prior to appt. . In addition to their scheduled follow up, the patient has also been instructed to follow up on as needed basis.     Future Appointments   Date Time Provider Department Center   10[facility]/2024  [facility] [address] [facility]   10/16/2024  9:30 AM Jatinder Pires, [place] [facility]   1/24/2025  8:10 AM Lilia Medrano, ANP [facility] GYN [facility] [facility]        ROSA Callahan

## 2024-07-05 ENCOUNTER — LAB VISIT (OUTPATIENT)
Dept: LAB | Facility: HOSPITAL | Age: 64
End: 2024-07-05
Attending: NURSE PRACTITIONER
Payer: COMMERCIAL

## 2024-07-05 DIAGNOSIS — E78.1 PURE HYPERTRIGLYCERIDEMIA: ICD-10-CM

## 2024-07-05 DIAGNOSIS — E87.6 HYPOKALEMIA: ICD-10-CM

## 2024-07-05 DIAGNOSIS — I10 PRIMARY HYPERTENSION: ICD-10-CM

## 2024-07-05 LAB
ANION GAP SERPL CALC-SCNC: 8 MEQ/L
BUN SERPL-MCNC: 15.1 MG/DL (ref 9.8–20.1)
CALCIUM SERPL-MCNC: 9.8 MG/DL (ref 8.4–10.2)
CHLORIDE SERPL-SCNC: 110 MMOL/L (ref 98–107)
CHOLEST SERPL-MCNC: 139 MG/DL
CHOLEST/HDLC SERPL: 3 {RATIO} (ref 0–5)
CO2 SERPL-SCNC: 26 MMOL/L (ref 23–31)
CREAT SERPL-MCNC: 0.81 MG/DL (ref 0.55–1.02)
CREAT/UREA NIT SERPL: 19
GFR SERPLBLD CREATININE-BSD FMLA CKD-EPI: >60 ML/MIN/1.73/M2
GLUCOSE SERPL-MCNC: 106 MG/DL (ref 82–115)
HDLC SERPL-MCNC: 41 MG/DL (ref 35–60)
LDLC SERPL CALC-MCNC: 49 MG/DL (ref 50–140)
POTASSIUM SERPL-SCNC: 3.7 MMOL/L (ref 3.5–5.1)
SODIUM SERPL-SCNC: 144 MMOL/L (ref 136–145)
TRIGL SERPL-MCNC: 243 MG/DL (ref 37–140)
TSH SERPL-ACNC: 1.68 UIU/ML (ref 0.35–4.94)
VLDLC SERPL CALC-MCNC: 49 MG/DL

## 2024-07-05 PROCEDURE — 84443 ASSAY THYROID STIM HORMONE: CPT

## 2024-07-05 PROCEDURE — 36415 COLL VENOUS BLD VENIPUNCTURE: CPT

## 2024-07-05 PROCEDURE — 80061 LIPID PANEL: CPT

## 2024-07-05 PROCEDURE — 80048 BASIC METABOLIC PNL TOTAL CA: CPT

## 2024-07-10 ENCOUNTER — OFFICE VISIT (OUTPATIENT)
Dept: INTERNAL MEDICINE | Facility: CLINIC | Age: 64
End: 2024-07-10
Payer: COMMERCIAL

## 2024-07-10 VITALS
RESPIRATION RATE: 18 BRPM | BODY MASS INDEX: 38.25 KG/M2 | WEIGHT: 207.88 LBS | TEMPERATURE: 98 F | HEART RATE: 64 BPM | HEIGHT: 62 IN | DIASTOLIC BLOOD PRESSURE: 80 MMHG | SYSTOLIC BLOOD PRESSURE: 126 MMHG

## 2024-07-10 DIAGNOSIS — E78.2 MIXED HYPERLIPIDEMIA: ICD-10-CM

## 2024-07-10 DIAGNOSIS — E78.1 PURE HYPERTRIGLYCERIDEMIA: ICD-10-CM

## 2024-07-10 DIAGNOSIS — I10 PRIMARY HYPERTENSION: Primary | ICD-10-CM

## 2024-07-10 PROCEDURE — 3044F HG A1C LEVEL LT 7.0%: CPT | Mod: CPTII,,, | Performed by: NURSE PRACTITIONER

## 2024-07-10 PROCEDURE — 3079F DIAST BP 80-89 MM HG: CPT | Mod: CPTII,,, | Performed by: NURSE PRACTITIONER

## 2024-07-10 PROCEDURE — 1160F RVW MEDS BY RX/DR IN RCRD: CPT | Mod: CPTII,,, | Performed by: NURSE PRACTITIONER

## 2024-07-10 PROCEDURE — 1159F MED LIST DOCD IN RCRD: CPT | Mod: CPTII,,, | Performed by: NURSE PRACTITIONER

## 2024-07-10 PROCEDURE — 4010F ACE/ARB THERAPY RXD/TAKEN: CPT | Mod: CPTII,,, | Performed by: NURSE PRACTITIONER

## 2024-07-10 PROCEDURE — 3008F BODY MASS INDEX DOCD: CPT | Mod: CPTII,,, | Performed by: NURSE PRACTITIONER

## 2024-07-10 PROCEDURE — 99214 OFFICE O/P EST MOD 30 MIN: CPT | Mod: PBBFAC | Performed by: NURSE PRACTITIONER

## 2024-07-10 PROCEDURE — 99214 OFFICE O/P EST MOD 30 MIN: CPT | Mod: S$PBB,,, | Performed by: NURSE PRACTITIONER

## 2024-07-10 PROCEDURE — 3074F SYST BP LT 130 MM HG: CPT | Mod: CPTII,,, | Performed by: NURSE PRACTITIONER

## 2024-07-10 RX ORDER — ATORVASTATIN CALCIUM 10 MG/1
20 TABLET, FILM COATED ORAL NIGHTLY
Qty: 90 TABLET | Refills: 1 | Status: SHIPPED | OUTPATIENT
Start: 2024-07-10 | End: 2025-07-10

## 2024-07-10 NOTE — PROGRESS NOTES
Patient ID: [id number]     Chief Complaint: Blood Pressure Check        HPI:     HPI      Laina Macias is a 63 y.o. female here today for follow up BP check after med adjustment.           Immunizations:   Immunization History   Administered Date(s) Administered    COVID-19 MRNA, LN-S PF (MODERNA HALF 0.25 ML DOSE) 2021, 2022    COVID-19, MRNA, LN-S, PF (MODERNA FULL 0.5 ML DOSE) 2021, 2021    Influenza - Quadrivalent - PF *Preferred* (6 months and older) 2023, 2023    Tdap 2017        -------------------------------------    Anemia    Diverticulosis    Hypertension    Personal history of colonic polyps        Past Surgical History:   Procedure Laterality Date     SECTION      COLONOSCOPY  10/21/2019    COLONOSCOPY, WITH POLYPECTOMY USING SNARE N/A 2023    Procedure: COLONOSCOPY, WITH POLYPECTOMY USING SNARE;  Surgeon: Herbert Hurtado MD;  Location: [facility] ENDOSCOPY;  Service: Endoscopy;  Laterality: N/A;    ectopic pregnancy N/A        Review of patient's allergies indicates:  No Known Allergies    Current Outpatient Medications   Medication Instructions    amLODIPine (NORVASC) 10 mg, Oral, Daily    atorvastatin (LIPITOR) 10 mg, Oral, Nightly    conjugated estrogens (PREMARIN) 0.5 g, Vaginal, Twice weekly, Apply dime size to gloved finger and apply to vaginal walls nightly 2x/week.    hydroCHLOROthiazide (HYDRODIURIL) 12.5 MG Tab TAKE ONE TABLET BY MOUTH EVERY DAY    lisinopriL (PRINIVIL,ZESTRIL) 20 mg, Oral, Daily    meloxicam (MOBIC) 7.5 mg, Oral, 2 times daily PRN    potassium chloride SA (K-DUR,KLOR-CON) 20 MEQ tablet 20 mEq, Oral    tiZANidine (ZANAFLEX) 4 mg, Oral, Every 6 hours PRN       Social History     Socioeconomic History    Marital status: Single   Tobacco Use    Smoking status: Never     Passive exposure: Never    Smokeless tobacco: Never   Substance and Sexual Activity    Alcohol use: Yes     Comment: holidays    Drug use: Never    Sexual  activity: Not Currently     Partners: Male     Social Determinants of Health     Financial Resource Strain: Low Risk  (7/17/2023)    Overall Financial Resource Strain (CARDIA)     Difficulty of Paying Living Expenses: Not hard at all   Food Insecurity: No Food Insecurity (7/17/2023)    Hunger Vital Sign     Worried About Running Out of Food in the Last Year: Never true     Ran Out of Food in the Last Year: Never true   Transportation Needs: No Transportation Needs (7/17/2023)    PRAPARE - Transportation     Lack of Transportation (Medical): No     Lack of Transportation (Non-Medical): No   Physical Activity: Inactive (7/17/2023)    Exercise Vital Sign     Days of Exercise per Week: 0 days     Minutes of Exercise per Session: 0 min   Stress: No Stress Concern Present (7/17/2023)    [place] [employer] - Occupational Stress Questionnaire     Feeling of Stress : Not at all   Housing Stability: Low Risk  (7/17/2023)    Housing Stability Vital Sign     Unable to Pay for Housing in the Last Year: No     Number of Places Lived in the Last Year: 1     Unstable Housing in the Last Year: No        Family History   Problem Relation Name Age of Onset    Diabetes type II Mother      Hypertension Mother          Patient Care Team:  Dea Campbell FNP as PCP - General (Family Medicine)  Stephanie Perry LPN as Care Coordinator     Subjective:     Review of Systems     See HPI for details    Constitutional: Denies Change in appetite. Denies Chills. Denies Fever. Denies Night sweats.  Eye: Denies Blurred vision. Denies Discharge. Denies Eye pain.  ENT: Denies Decreased hearing. Denies Sore throat. Denies Swollen glands.  Respiratory: Denies Cough. Denies Shortness of breath. Denies Shortness of breath with exertion. Denies Wheezing.  Cardiovascular: DeniesChest pain at rest. Denies Chest pain with exertion. Denies Irregular heartbeat. Denies Palpitations. Denies Edema.  Gastrointestinal: Denies  "Abdominal pain. DeniesDiarrhea. Denies Nausea. Denies Vomiting. Denies Hematemesis or Hematochezia.  Genitourinary: Denies Dysuria. Denies Urinary frequency. Denies Urinary urgency. Denies Blood in urine.  Endocrine: Denies Cold intolerance. Denies Excessive thirst. Denies Heat intolerance. Denies Weight loss. Denies Weight gain.  Musculoskeletal: Denies Painful joints. Denies Weakness.  Integumentary: Denies Rash. Denies Itching. Denies Dry skin.  Neurologic: Denies Dizziness. Denies Fainting. Denies Headache.  Psychiatric: Denies Depression. Denies Anxiety. Denies Suicidal/Homicidal ideations.    All Other ROS: Negative except as stated in HPI.       Objective:     Visit Vitals  /80 (BP Location: Right arm, Patient Position: Sitting, BP Method: Large (Automatic))   Pulse 64   Temp 98.1 °F (36.7 °C) (Oral)   Resp 18   Ht 5' 2" (1.575 m)   Wt 94.3 kg (207 lb 14.3 oz)   BMI 38.02 kg/m²       Physical Exam    General: Alert and oriented, No acute distress.  Head: Normocephalic, Atraumatic.  Eye: Pupils are equal, round and reactive to light, Extraocular movements are intact, Sclera non-icteric.  Ears/Nose/Throat: Normal, Mucosa moist,Clear.  Neck/Thyroid: Supple, Non-tender, No carotid bruit, No lymphadenopathy, No JVD, Full range of motion.  Respiratory: Clear to auscultation bilaterally; No wheezes, rales or rhonchi,Non-labored respirations, Symmetrical chest wall expansion.  Cardiovascular: Regular rate and rhythm, S1/S2 normal, No murmurs, rubs or gallops.  Gastrointestinal: Soft, Non-tender, Non-distended, Normal bowel sounds, No palpable organomegaly.  Musculoskeletal: Normal range of motion.  Integumentary: Warm, Dry, Intact, No suspicious lesions or rashes.  Extremities: No clubbing, cyanosis or edema  Neurologic: No focal deficits, Cranial Nerves II-XII are grossly intact, Motor strength normal upper and lower extremities, Sensory exam intact.  Psychiatric: Normal interaction, Coherent speech, Euthymic " mood, Appropriate affect       Labs Reviewed:     Chemistry:  Lab Results   Component Value Date     07/05/2024    K 3.7 07/05/2024    BUN 15.1 07/05/2024    CREATININE 0.81 07/05/2024    EGFRNORACEVR >60 07/05/2024    GLUCOSE 106 07/05/2024    CALCIUM 9.8 07/05/2024    ALKPHOS 46 05/21/2024    LABPROT 7.5 05/21/2024    ALBUMIN 3.5 05/21/2024    BILIDIR 0.1 05/08/2021    IBILI 0.30 05/08/2021    AST 17 05/21/2024    ALT 14 05/21/2024    [id number] 39.3 05/08/2021        Lab Results   Component Value Date    HGBA1C 5.5 02/21/2024        Hematology:  Lab Results   Component Value Date    WBC 6.72 02/21/2024    HGB 13.9 02/21/2024    HCT 38.5 02/21/2024     02/21/2024       Lipid Panel:  Lab Results   Component Value Date    CHOL 139 07/05/2024    HDL 41 07/05/2024    LDL 49.00 (L) 07/05/2024    TRIG 243 (H) 07/05/2024    TOTALCHOLEST 3 07/05/2024        Urine:  Lab Results   Component Value Date    APPEARANCEUA Clear 08/08/2023    SGUA 1.016 08/08/2023    PROTEINUA 2+ (A) 08/08/2023    KETONESUA Negative 08/08/2023    LEUKOCYTESUR Negative 08/08/2023    RBCUA 6-10 (A) 08/08/2023    WBCUA 0-5 08/08/2023    BACTERIA None Seen 08/08/2023    SQEPUA Trace (A) 08/08/2023    HYALINECASTS None Seen 08/08/2023        Assessment:       ICD-10-CM ICD-9-CM   1. Primary hypertension  I10 401.9   2. Mixed hyperlipidemia  E78.2 272.2   3. Pure hypertriglyceridemia  E78.1 272.1        Plan:     1. Primary hypertension  BP controlled. Low fat low salt diet and exercise. Cont Amlodipine, HCTZ, Lisinopril as prescribed. Potassium level returned to normal since decreasing HCTZ and increase Lisinopril.     2. Mixed hyperlipidemia  Trigs 243 down from 244. Low fat diet and exercise. Increase Atorvastatin to 20 mg 1 tab po Q hs. FLP in 6 months.          Follow up for Keep appt 9-17-24 as schedlued with labs 1 week prior to appt. . In addition to their scheduled follow up, the patient has also been instructed to follow up on  as needed basis.     Future Appointments   Date Time Provider Department Center   9/17/2024  7:20 AM Dea Campbell FNP [facility]TM[facility] [place]   10/8/2024  9:30 AM [address] [place]    10/16/2024  9:30 AM Jatinder Pires, [place] [facility] UROLO [place]   1/24/2025  8:10 AM Lilia Medrano, [place] [facility] GYN [place]        ROSA Callahan

## 2024-08-26 PROBLEM — Z00.00 WELL ADULT EXAM: Status: RESOLVED | Noted: 2022-05-23 | Resolved: 2024-08-26

## 2024-09-12 ENCOUNTER — LAB VISIT (OUTPATIENT)
Dept: LAB | Facility: HOSPITAL | Age: 64
End: 2024-09-12
Attending: NURSE PRACTITIONER
Payer: COMMERCIAL

## 2024-09-12 DIAGNOSIS — I10 PRIMARY HYPERTENSION: ICD-10-CM

## 2024-09-12 DIAGNOSIS — I10 PRIMARY HYPERTENSION: Primary | ICD-10-CM

## 2024-09-12 LAB
ALBUMIN SERPL-MCNC: 3.5 G/DL (ref 3.4–4.8)
ALBUMIN/GLOB SERPL: 0.9 RATIO (ref 1.1–2)
ALP SERPL-CCNC: 55 UNIT/L (ref 40–150)
ALT SERPL-CCNC: 16 UNIT/L (ref 0–55)
ANION GAP SERPL CALC-SCNC: 8 MEQ/L
AST SERPL-CCNC: 15 UNIT/L (ref 5–34)
BASOPHILS # BLD AUTO: 0.04 X10(3)/MCL
BASOPHILS NFR BLD AUTO: 0.5 %
BILIRUB SERPL-MCNC: 0.3 MG/DL
BUN SERPL-MCNC: 14.3 MG/DL (ref 9.8–20.1)
CALCIUM SERPL-MCNC: 9.8 MG/DL (ref 8.4–10.2)
CHLORIDE SERPL-SCNC: 110 MMOL/L (ref 98–107)
CO2 SERPL-SCNC: 27 MMOL/L (ref 23–31)
CREAT SERPL-MCNC: 0.84 MG/DL (ref 0.55–1.02)
CREAT/UREA NIT SERPL: 17
EOSINOPHIL # BLD AUTO: 0.07 X10(3)/MCL (ref 0–0.9)
EOSINOPHIL NFR BLD AUTO: 0.9 %
ERYTHROCYTE [DISTWIDTH] IN BLOOD BY AUTOMATED COUNT: 13.6 % (ref 11.5–17)
GFR SERPLBLD CREATININE-BSD FMLA CKD-EPI: >60 ML/MIN/1.73/M2
GLOBULIN SER-MCNC: 3.9 GM/DL (ref 2.4–3.5)
GLUCOSE SERPL-MCNC: 101 MG/DL (ref 82–115)
HCT VFR BLD AUTO: 41.8 % (ref 37–47)
HGB BLD-MCNC: 14.6 G/DL (ref 12–16)
IMM GRANULOCYTES # BLD AUTO: 0.02 X10(3)/MCL (ref 0–0.04)
IMM GRANULOCYTES NFR BLD AUTO: 0.3 %
LYMPHOCYTES # BLD AUTO: 3.38 X10(3)/MCL (ref 0.6–4.6)
LYMPHOCYTES NFR BLD AUTO: 44.9 %
MCH RBC QN AUTO: 27.9 PG (ref 27–31)
MCHC RBC AUTO-ENTMCNC: 34.9 G/DL (ref 33–36)
MCV RBC AUTO: 79.9 FL (ref 80–94)
MONOCYTES # BLD AUTO: 0.69 X10(3)/MCL (ref 0.1–1.3)
MONOCYTES NFR BLD AUTO: 9.2 %
NEUTROPHILS # BLD AUTO: 3.32 X10(3)/MCL (ref 2.1–9.2)
NEUTROPHILS NFR BLD AUTO: 44.2 %
NRBC BLD AUTO-RTO: 0 %
PLATELET # BLD AUTO: 399 X10(3)/MCL (ref 130–400)
PMV BLD AUTO: 9.1 FL (ref 7.4–10.4)
POTASSIUM SERPL-SCNC: 3.5 MMOL/L (ref 3.5–5.1)
PROT SERPL-MCNC: 7.4 GM/DL (ref 5.8–7.6)
RBC # BLD AUTO: 5.23 X10(6)/MCL (ref 4.2–5.4)
SODIUM SERPL-SCNC: 145 MMOL/L (ref 136–145)
WBC # BLD AUTO: 7.52 X10(3)/MCL (ref 4.5–11.5)

## 2024-09-12 PROCEDURE — 85025 COMPLETE CBC W/AUTO DIFF WBC: CPT

## 2024-09-12 PROCEDURE — 80053 COMPREHEN METABOLIC PANEL: CPT

## 2024-09-12 PROCEDURE — 36415 COLL VENOUS BLD VENIPUNCTURE: CPT

## 2024-09-17 ENCOUNTER — OFFICE VISIT (OUTPATIENT)
Dept: INTERNAL MEDICINE | Facility: CLINIC | Age: 64
End: 2024-09-17
Payer: COMMERCIAL

## 2024-09-17 VITALS
DIASTOLIC BLOOD PRESSURE: 85 MMHG | OXYGEN SATURATION: 99 % | WEIGHT: 182.81 LBS | HEART RATE: 68 BPM | HEIGHT: 62 IN | TEMPERATURE: 98 F | BODY MASS INDEX: 33.64 KG/M2 | SYSTOLIC BLOOD PRESSURE: 137 MMHG | RESPIRATION RATE: 20 BRPM

## 2024-09-17 DIAGNOSIS — I10 PRIMARY HYPERTENSION: Primary | ICD-10-CM

## 2024-09-17 DIAGNOSIS — E87.6 HYPOKALEMIA: ICD-10-CM

## 2024-09-17 DIAGNOSIS — Z12.31 BREAST CANCER SCREENING BY MAMMOGRAM: ICD-10-CM

## 2024-09-17 DIAGNOSIS — D12.8 ADENOMATOUS RECTAL POLYP: ICD-10-CM

## 2024-09-17 DIAGNOSIS — M79.605 LEFT LEG PAIN: ICD-10-CM

## 2024-09-17 DIAGNOSIS — E66.01 SEVERE OBESITY (BMI 35.0-39.9) WITH COMORBIDITY: ICD-10-CM

## 2024-09-17 DIAGNOSIS — E78.2 MIXED HYPERLIPIDEMIA: ICD-10-CM

## 2024-09-17 DIAGNOSIS — R31.21 ASYMPTOMATIC MICROSCOPIC HEMATURIA: ICD-10-CM

## 2024-09-17 DIAGNOSIS — Z23 NEED FOR VACCINATION: ICD-10-CM

## 2024-09-17 DIAGNOSIS — Z78.0 MENOPAUSE: ICD-10-CM

## 2024-09-17 DIAGNOSIS — Z00.00 WELL ADULT EXAM: ICD-10-CM

## 2024-09-17 DIAGNOSIS — R19.5 POSITIVE FIT (FECAL IMMUNOCHEMICAL TEST): ICD-10-CM

## 2024-09-17 PROCEDURE — 3008F BODY MASS INDEX DOCD: CPT | Mod: CPTII,,, | Performed by: NURSE PRACTITIONER

## 2024-09-17 PROCEDURE — 99215 OFFICE O/P EST HI 40 MIN: CPT | Mod: PBBFAC,25 | Performed by: NURSE PRACTITIONER

## 2024-09-17 PROCEDURE — 1160F RVW MEDS BY RX/DR IN RCRD: CPT | Mod: CPTII,,, | Performed by: NURSE PRACTITIONER

## 2024-09-17 PROCEDURE — 3288F FALL RISK ASSESSMENT DOCD: CPT | Mod: CPTII,,, | Performed by: NURSE PRACTITIONER

## 2024-09-17 PROCEDURE — 4010F ACE/ARB THERAPY RXD/TAKEN: CPT | Mod: CPTII,,, | Performed by: NURSE PRACTITIONER

## 2024-09-17 PROCEDURE — 90471 IMMUNIZATION ADMIN: CPT | Mod: PBBFAC

## 2024-09-17 PROCEDURE — 1101F PT FALLS ASSESS-DOCD LE1/YR: CPT | Mod: CPTII,,, | Performed by: NURSE PRACTITIONER

## 2024-09-17 PROCEDURE — 90677 PCV20 VACCINE IM: CPT | Mod: PBBFAC

## 2024-09-17 PROCEDURE — 3044F HG A1C LEVEL LT 7.0%: CPT | Mod: CPTII,,, | Performed by: NURSE PRACTITIONER

## 2024-09-17 PROCEDURE — 1159F MED LIST DOCD IN RCRD: CPT | Mod: CPTII,,, | Performed by: NURSE PRACTITIONER

## 2024-09-17 PROCEDURE — 99214 OFFICE O/P EST MOD 30 MIN: CPT | Mod: S$PBB,,, | Performed by: NURSE PRACTITIONER

## 2024-09-17 PROCEDURE — 3075F SYST BP GE 130 - 139MM HG: CPT | Mod: CPTII,,, | Performed by: NURSE PRACTITIONER

## 2024-09-17 PROCEDURE — 3079F DIAST BP 80-89 MM HG: CPT | Mod: CPTII,,, | Performed by: NURSE PRACTITIONER

## 2024-09-17 RX ORDER — HYDROCHLOROTHIAZIDE 12.5 MG/1
TABLET ORAL
Qty: 90 TABLET | Refills: 1 | Status: SHIPPED | OUTPATIENT
Start: 2024-09-17

## 2024-09-17 RX ORDER — ATORVASTATIN CALCIUM 20 MG/1
20 TABLET, FILM COATED ORAL
COMMUNITY
Start: 2024-09-09 | End: 2024-09-17

## 2024-09-17 RX ORDER — POTASSIUM CHLORIDE 20 MEQ/1
20 TABLET, EXTENDED RELEASE ORAL DAILY
Qty: 90 TABLET | Refills: 1 | Status: SHIPPED | OUTPATIENT
Start: 2024-09-17

## 2024-09-17 RX ORDER — MELOXICAM 7.5 MG/1
7.5 TABLET ORAL 2 TIMES DAILY PRN
Qty: 60 TABLET | Refills: 3 | Status: CANCELLED | OUTPATIENT
Start: 2024-09-17

## 2024-09-17 RX ORDER — TIZANIDINE 4 MG/1
4 TABLET ORAL EVERY 6 HOURS PRN
Qty: 90 TABLET | Refills: 1 | Status: CANCELLED | OUTPATIENT
Start: 2024-09-17

## 2024-09-17 RX ORDER — LISINOPRIL 20 MG/1
20 TABLET ORAL DAILY
Qty: 60 TABLET | Refills: 3 | Status: SHIPPED | OUTPATIENT
Start: 2024-09-17 | End: 2025-09-17

## 2024-09-17 RX ORDER — HYDROCHLOROTHIAZIDE 25 MG/1
25 TABLET ORAL
COMMUNITY
Start: 2024-09-10 | End: 2024-09-17

## 2024-09-17 RX ORDER — AMLODIPINE BESYLATE 10 MG/1
10 TABLET ORAL DAILY
Qty: 90 TABLET | Refills: 1 | Status: SHIPPED | OUTPATIENT
Start: 2024-09-17

## 2024-09-17 RX ORDER — ATORVASTATIN CALCIUM 10 MG/1
20 TABLET, FILM COATED ORAL NIGHTLY
Qty: 90 TABLET | Refills: 1 | Status: CANCELLED | OUTPATIENT
Start: 2024-09-17 | End: 2025-09-17

## 2024-09-17 RX ADMIN — PNEUMOCOCCAL 20-VALENT CONJUGATE VACCINE 0.5 ML
2.2; 2.2; 2.2; 2.2; 2.2; 2.2; 2.2; 2.2; 2.2; 2.2; 2.2; 2.2; 2.2; 2.2; 2.2; 2.2; 4.4; 2.2; 2.2; 2.2 INJECTION, SUSPENSION INTRAMUSCULAR at 08:09

## 2024-09-17 NOTE — PROGRESS NOTES
Patient ID: [id number]     Chief Complaint: Follow-up and Labs Only        HPI:     HPI      Laina Macias is a 64 y.o. female here today for a follow up.         Immunizations:   Immunization History   Administered Date(s) Administered    COVID-19 MRNA, LN-S PF (MODERNA HALF 0.25 ML DOSE) 2021, 2022    COVID-19, MRNA, LN-S, PF (MODERNA FULL 0.5 ML DOSE) 2021, 2021    Influenza - Quadrivalent - PF *Preferred* (6 months and older) 2023, 2023    Tdap 2017        -------------------------------------    Anemia    Diverticulosis    Hypertension    Personal history of colonic polyps        Past Surgical History:   Procedure Laterality Date     SECTION      COLONOSCOPY  10/21/2019    COLONOSCOPY, WITH POLYPECTOMY USING SNARE N/A 2023    Procedure: COLONOSCOPY, WITH POLYPECTOMY USING SNARE;  Surgeon: Herbert Hurtado MD;  Location: [facility] ENDOSCOPY;  Service: Endoscopy;  Laterality: N/A;    ectopic pregnancy N/A        Review of patient's allergies indicates:  No Known Allergies    Current Outpatient Medications   Medication Instructions    amLODIPine (NORVASC) 10 mg, Oral, Daily    atorvastatin (LIPITOR) 20 mg, Oral, Nightly    atorvastatin (LIPITOR) 20 mg, Oral    conjugated estrogens (PREMARIN) 0.5 g, Vaginal, Twice weekly, Apply dime size to gloved finger and apply to vaginal walls nightly 2x/week.    hydroCHLOROthiazide (HYDRODIURIL) 12.5 MG Tab TAKE ONE TABLET BY MOUTH EVERY DAY    hydroCHLOROthiazide (HYDRODIURIL) 25 mg, Oral    lisinopriL (PRINIVIL,ZESTRIL) 20 mg, Oral, Daily    meloxicam (MOBIC) 7.5 mg, Oral, 2 times daily PRN    potassium chloride SA (K-DUR,KLOR-CON) 20 MEQ tablet 20 mEq, Oral    tiZANidine (ZANAFLEX) 4 mg, Oral, Every 6 hours PRN       Social History     Socioeconomic History    Marital status: Single   Tobacco Use    Smoking status: Never     Passive exposure: Never    Smokeless tobacco: Never   Substance and Sexual Activity    Alcohol  use: Yes     Comment: holidays    Drug use: Never    Sexual activity: Not Currently     Partners: Male     Social Determinants of Health     Financial Resource Strain: Low Risk  (7/17/2023)    Overall Financial Resource Strain (CARDIA)     Difficulty of Paying Living Expenses: Not hard at all   Food Insecurity: No Food Insecurity (7/17/2023)    Hunger Vital Sign     Worried About Running Out of Food in the Last Year: Never true     Ran Out of Food in the Last Year: Never true   Transportation Needs: No Transportation Needs (7/17/2023)    PRAPARE - Transportation     Lack of Transportation (Medical): No     Lack of Transportation (Non-Medical): No   Physical Activity: Inactive (7/17/2023)    Exercise Vital Sign     Days of Exercise per Week: 0 days     Minutes of Exercise per Session: 0 min   Stress: No Stress Concern Present (7/17/2023)    [employer] [employer] - Occupational Stress Questionnaire     Feeling of Stress : Not at all   Housing Stability: Low Risk  (7/17/2023)    Housing Stability Vital Sign     Unable to Pay for Housing in the Last Year: No     Number of Places Lived in the Last Year: 1     Unstable Housing in the Last Year: No        Family History   Problem Relation Name Age of Onset    Diabetes type II Mother      Hypertension Mother          Patient Care Team:  Dea Campbell FNP as PCP - General (Family Medicine)  Stephanie Perry LPN as Care Coordinator     Subjective:     Review of Systems     See HPI for details    Constitutional: Denies Change in appetite. Denies Chills. Denies Fever. Denies Night sweats.  Eye: Denies Blurred vision. Denies Discharge. Denies Eye pain.  ENT: Denies Decreased hearing. Denies Sore throat. Denies Swollen glands.  Respiratory: Denies Cough. Denies Shortness of breath. Denies Shortness of breath with exertion. Denies Wheezing.  Cardiovascular: DeniesChest pain at rest. Denies Chest pain with exertion. Denies Irregular heartbeat. Denies  Palpitations. Denies Edema.  Gastrointestinal: Denies Abdominal pain. DeniesDiarrhea. Denies Nausea. Denies Vomiting. Denies Hematemesis or Hematochezia.  Genitourinary: Denies Dysuria. Denies Urinary frequency. Denies Urinary urgency. Denies Blood in urine.  Endocrine: Denies Cold intolerance. Denies Excessive thirst. Denies Heat intolerance. Denies Weight loss. Denies Weight gain.  Musculoskeletal: Denies Painful joints. Denies Weakness.  Integumentary: Denies Rash. Denies Itching. Denies Dry skin.  Neurologic: Denies Dizziness. Denies Fainting. Denies Headache.  Psychiatric: Denies Depression. Denies Anxiety. Denies Suicidal/Homicidal ideations.    All Other ROS: Negative except as stated in HPI.       Objective:     There were no vitals taken for this visit.    Physical Exam    General: Alert and oriented, No acute distress.  Head: Normocephalic, Atraumatic.  Eye: Pupils are equal, round and reactive to light, Extraocular movements are intact, Sclera non-icteric.  Ears/Nose/Throat: Normal, Mucosa moist,Clear.  Neck/Thyroid: Supple, Non-tender, No carotid bruit, No lymphadenopathy, No JVD, Full range of motion.  Respiratory: Clear to auscultation bilaterally; No wheezes, rales or rhonchi,Non-labored respirations, Symmetrical chest wall expansion.  Cardiovascular: Regular rate and rhythm, S1/S2 normal, No murmurs, rubs or gallops.  Gastrointestinal: Soft, Non-tender, Non-distended, Normal bowel sounds, No palpable organomegaly.  Musculoskeletal: Normal range of motion.  Integumentary: Warm, Dry, Intact, No suspicious lesions or rashes.  Extremities: No clubbing, cyanosis or edema  Neurologic: No focal deficits, Cranial Nerves II-XII are grossly intact, Motor strength normal upper and lower extremities, Sensory exam intact.  Psychiatric: Normal interaction, Coherent speech, Euthymic mood, Appropriate affect       Labs Reviewed:     Chemistry:  Lab Results   Component Value Date     09/12/2024    K 3.5  09/12/2024    BUN 14.3 09/12/2024    CREATININE 0.84 09/12/2024    EGFRNORACEVR >60 09/12/2024    GLUCOSE 101 09/12/2024    CALCIUM 9.8 09/12/2024    ALKPHOS 55 09/12/2024    LABPROT 7.4 09/12/2024    ALBUMIN 3.5 09/12/2024    BILIDIR 0.1 05/08/2021    IBILI 0.30 05/08/2021    AST 15 09/12/2024    ALT 16 09/12/2024    [id number] 39.3 05/08/2021        Lab Results   Component Value Date    HGBA1C 5.5 02/21/2024        Hematology:  Lab Results   Component Value Date    WBC 7.52 09/12/2024    HGB 14.6 09/12/2024    HCT 41.8 09/12/2024     09/12/2024       Lipid Panel:  Lab Results   Component Value Date    CHOL 139 07/05/2024    HDL 41 07/05/2024    LDL 49.00 (L) 07/05/2024    TRIG 243 (H) 07/05/2024    TOTALCHOLEST 3 07/05/2024        Urine:  Lab Results   Component Value Date    APPEARANCEUA Clear 08/08/2023    SGUA 1.016 08/08/2023    PROTEINUA 2+ (A) 08/08/2023    KETONESUA Negative 08/08/2023    LEUKOCYTESUR Negative 08/08/2023    RBCUA 6-10 (A) 08/08/2023    WBCUA 0-5 08/08/2023    BACTERIA None Seen 08/08/2023    SQEPUA Trace (A) 08/08/2023    HYALINECASTS None Seen 08/08/2023        Assessment:       ICD-10-CM ICD-9-CM   1. Primary hypertension  I10 401.9   2. Mixed hyperlipidemia  E78.2 272.2   3. Hypokalemia  E87.6 276.8   4. Positive FIT (fecal immunochemical test)  R19.5 792.1   5. Severe obesity (BMI 35.0-39.9) with comorbidity  E66.01 278.01   6. Asymptomatic microscopic hematuria  R31.21 599.72   7. Breast cancer screening by mammogram  Z12.31 V76.12   8. Adenomatous rectal polyp  D12.8 211.4   9. Well adult exam  Z00.00 V70.0   10. Menopause  Z78.0 627.2        Plan:     1. Primary hypertension  Low fat low salt diet and exercise. Cont Amlodipine, HCTZ, Lisinopril as prescribed. Potassium level returned to normal since decreasing HCTZ and increase Lisinopril.     2. Mixed hyperlipidemia  Trigs 243. Low fat diet and exercise. Cont Atorvastatin to 20 mg 1 tab po Q hs. FLP in 6 months.     3.  Hypokalemia  Resolved. Will monitor.     4. Positive FIT (fecal immunochemical test)  UTD colonoscopy done 1-26-23 polyps X 4 no malignancy- repeat in 3 years (1/2026).      5. Severe obesity (BMI 35.0-39.9) with comorbidity  Low fat diet and exercise encouraged. Education provided.     6. Asymptomatic microscopic hematuria  Pt followed in Uro cl. Keep appts.     7. Breast cancer screening by mammogram  MMG in 4 months.     8. Adenomatous rectal polyp   UTD colonoscopy done 1-26-23 polyps X 4 no malignancy- repeat in 3 years (1/2026). UTD colonoscopy done 1-26-23 polyps X 4 no malignancy- repeat in 3 years (1/2026).      9. Well adult exam  Labs in 6 months. Keep GYN cl appt. MMG in 4 months. UTD colonoscopy done 1-26-23 polyps X 4 no malignancy- repeat in 3 years (1/2026). UTD colonoscopy done 1-26-23 polyps X 4 no malignancy- repeat in 3 years (1/2026).     10. Menopause  DEXA in 4 months. Encouraged Calcium with Vit D OTC 1 tab po BID after menopause. Encouraged wt bearing exercise such as walking daily.     11. Need for vaccination  Give Pneumovax today.      Follow up in about 6 months (around 3/17/2025) for with labs 1 week prior to appt. . In addition to their scheduled follow up, the patient has also been instructed to follow up on as needed basis.     Future Appointments   Date Time Provider Department Center   10/8/2024  9:30 [address]    10/16/2024  9:30 AM Jatinder Pires, [place] [facility] UROLO [place]    1/24/2025  8:10 AM Lilia Medrano, ANP [facility] GYN [place]        ROSA Callahan

## 2024-10-08 ENCOUNTER — HOSPITAL ENCOUNTER (OUTPATIENT)
Dept: RADIOLOGY | Facility: HOSPITAL | Age: 64
Discharge: HOME | End: 2024-10-08
Attending: NURSE PRACTITIONER
Payer: COMMERCIAL

## 2024-10-08 DIAGNOSIS — Z78.0 MENOPAUSE: ICD-10-CM

## 2024-10-08 DIAGNOSIS — N20.0 RENAL CALCULUS: ICD-10-CM

## 2024-10-08 DIAGNOSIS — R31.9 HEMATURIA, UNSPECIFIED TYPE: ICD-10-CM

## 2024-10-08 PROCEDURE — 76775 US EXAM ABDO BACK WALL LIM: CPT | Mod: TC

## 2024-10-08 PROCEDURE — 77080 DXA BONE DENSITY AXIAL: CPT | Mod: TC

## 2024-10-16 ENCOUNTER — OFFICE VISIT (OUTPATIENT)
Dept: UROLOGY | Facility: CLINIC | Age: 64
End: 2024-10-16
Payer: COMMERCIAL

## 2024-10-16 VITALS
WEIGHT: 207.63 LBS | TEMPERATURE: 98 F | SYSTOLIC BLOOD PRESSURE: 118 MMHG | HEART RATE: 64 BPM | HEIGHT: 62 IN | BODY MASS INDEX: 38.21 KG/M2 | DIASTOLIC BLOOD PRESSURE: 69 MMHG | RESPIRATION RATE: 20 BRPM | OXYGEN SATURATION: 99 %

## 2024-10-16 DIAGNOSIS — R31.21 ASYMPTOMATIC MICROSCOPIC HEMATURIA: Primary | ICD-10-CM

## 2024-10-16 PROCEDURE — 3078F DIAST BP <80 MM HG: CPT | Mod: CPTII,,, | Performed by: NURSE PRACTITIONER

## 2024-10-16 PROCEDURE — 99213 OFFICE O/P EST LOW 20 MIN: CPT | Mod: S$PBB,,, | Performed by: NURSE PRACTITIONER

## 2024-10-16 PROCEDURE — 3074F SYST BP LT 130 MM HG: CPT | Mod: CPTII,,, | Performed by: NURSE PRACTITIONER

## 2024-10-16 PROCEDURE — 99214 OFFICE O/P EST MOD 30 MIN: CPT | Mod: PBBFAC,25 | Performed by: NURSE PRACTITIONER

## 2024-10-16 PROCEDURE — 3288F FALL RISK ASSESSMENT DOCD: CPT | Mod: CPTII,,, | Performed by: NURSE PRACTITIONER

## 2024-10-16 PROCEDURE — 88108 CYTOPATH CONCENTRATE TECH: CPT | Mod: TC | Performed by: NURSE PRACTITIONER

## 2024-10-16 PROCEDURE — 1101F PT FALLS ASSESS-DOCD LE1/YR: CPT | Mod: CPTII,,, | Performed by: NURSE PRACTITIONER

## 2024-10-16 PROCEDURE — 4010F ACE/ARB THERAPY RXD/TAKEN: CPT | Mod: CPTII,,, | Performed by: NURSE PRACTITIONER

## 2024-10-16 PROCEDURE — 1159F MED LIST DOCD IN RCRD: CPT | Mod: CPTII,,, | Performed by: NURSE PRACTITIONER

## 2024-10-16 PROCEDURE — 3044F HG A1C LEVEL LT 7.0%: CPT | Mod: CPTII,,, | Performed by: NURSE PRACTITIONER

## 2024-10-16 PROCEDURE — 3008F BODY MASS INDEX DOCD: CPT | Mod: CPTII,,, | Performed by: NURSE PRACTITIONER

## 2024-10-16 NOTE — PROGRESS NOTES
Chief Complaint: No chief complaint on file.      HPI:   Patient is a 63 year old female here for hematuria and kidney stones.    Patient seen by PCP Cheyanne Rider NP with persistent microscopic hematuria.    Patient also has a history of kidney stones in the past she was seen by Dr. Auguste with a renal ultrasound in 2021 that noted mild hydronephrosis right renal cysts and diminishing renal stones in the left kidney.    Patient's last visit patient denies any gross hematuria, nocturia, urinary frequency, urinary urgency, dysuria, urinary incontinence, urinary retention.    Patient underwent a recent cystoscope with Dr. Chacon on 10/18/2023 negative results from cystoscope however will monitor hematuria with urine cytology, renal stones and cysts with renal ultrasound in 6 months.   Discuss results with renal ultrasound with patient she denies any urologic abnormal symptoms today.      Allergies:  Review of patient's allergies indicates:  No Known Allergies    Medications:  Current Outpatient Medications   Medication Sig Dispense Refill    amLODIPine (NORVASC) 10 MG tablet Take 1 tablet (10 mg total) by mouth once daily. 90 tablet 1    atorvastatin (LIPITOR) 10 MG tablet Take 2 tablets (20 mg total) by mouth every evening. (Patient not taking: Reported on 9/17/2024) 90 tablet 1    conjugated estrogens (PREMARIN) vaginal cream Place 0.5 g vaginally twice a week. Apply dime size to gloved finger and apply to vaginal walls nightly 2x/week. (Patient not taking: Reported on 9/17/2024) 30 g 6    hydroCHLOROthiazide (HYDRODIURIL) 12.5 MG Tab TAKE ONE TABLET BY MOUTH EVERY DAY 90 tablet 1    lisinopriL (PRINIVIL,ZESTRIL) 20 MG tablet Take 1 tablet (20 mg total) by mouth once daily. 60 tablet 3    meloxicam (MOBIC) 7.5 MG tablet Take 1 tablet (7.5 mg total) by mouth 2 (two) times daily as needed for Pain. 60 tablet 3    potassium chloride SA (K-DUR,KLOR-CON) 20 MEQ tablet Take 1 tablet (20 mEq total) by mouth once daily. 90  tablet 1    tiZANidine (ZANAFLEX) 4 MG tablet Take 1 tablet (4 mg total) by mouth every 6 (six) hours as needed (muscle spasm). 90 tablet 1     No current facility-administered medications for this visit.       Review of Systems:  General: No fever, chills, fatigability, or weight loss.  Skin: No rashes, itching, or changes in color or texture of skin.  Chest: Denies NYE, cyanosis, wheezing, cough, and sputum production.  Abdomen: Appetite fine. No weight loss. Denies diarrhea, abdominal pain, hematemesis, or blood in stool.  Musculoskeletal: No joint stiffness or swelling. Denies back pain.  : As above.  All other review of systems negative.    PMH:  Past Medical History:   Diagnosis Date    Anemia     Diverticulosis     Hypertension     Personal history of colonic polyps        PSH:  Past Surgical History:   Procedure Laterality Date     SECTION      COLONOSCOPY  10/21/2019    COLONOSCOPY, WITH POLYPECTOMY USING SNARE N/A 2023    Procedure: COLONOSCOPY, WITH POLYPECTOMY USING SNARE;  Surgeon: Herbert Hurtado MD;  Location: [facility] ENDOSCOPY;  Service: Endoscopy;  Laterality: N/A;    ectopic pregnancy N/A        FamHx:  Family History   Problem Relation Name Age of Onset    Diabetes type II Mother      Hypertension Mother         SocHx:  Social History     Socioeconomic History    Marital status: Single   Tobacco Use    Smoking status: Never     Passive exposure: Never    Smokeless tobacco: Never   Substance and Sexual Activity    Alcohol use: Yes     Comment: holidays    Drug use: Never    Sexual activity: Not Currently     Partners: Male     Social Drivers of Health     Financial Resource Strain: Low Risk  (2023)    Overall Financial Resource Strain (CARDIA)     Difficulty of Paying Living Expenses: Not hard at all   Food Insecurity: No Food Insecurity (2023)    Hunger Vital Sign     Worried About Running Out of Food in the Last Year: Never true     Ran Out of Food in the Last Year: Never  true   Transportation Needs: No Transportation Needs (7/17/2023)    PRAPARE - Transportation     Lack of Transportation (Medical): No     Lack of Transportation (Non-Medical): No   Physical Activity: Inactive (7/17/2023)    Exercise Vital Sign     Days of Exercise per Week: 0 days     Minutes of Exercise per Session: 0 min   Stress: No Stress Concern Present (7/17/2023)    Citizen of [place] [employer]ational [employer] - Occupational Stress Questionnaire     Feeling of Stress : Not at all   Housing Stability: Low Risk  (7/17/2023)    Housing Stability Vital Sign     Unable to Pay for Housing in the Last Year: No     Number of Places Lived in the Last Year: 1     Unstable Housing in the Last Year: No       Physical Exam:  There were no vitals filed for this visit.  General: A&Ox3, no apparent distress, no deformities  Neck: No masses, normal thyroid  Lungs: CTA shannon, no use of accessory muscles  Heart: RRR, no arrhythmias  Abdomen: Soft, NT, ND, no masses, no hernias, no hepatosplenomegaly  Lymphatic: Neck and groin nodes negative  Skin: The skin is warm and dry. No jaundice.  Ext: No c/c/e.    Imaging:  Renal ultrasound 10/08/2024 revealed: Mild to moderate left hydronephrosis, unchanged from prior CT exam. Nonobstructing left nephrolithiasis. Simple renal cysts.  Simple cysts require no imaging follow-up.    Impression:  Microscopic hematuria  2.   Kidney stones  Plan:  Instructed patient scheduled for a a day audio virtual visit to discuss urine cytology results.  Instructed patient on timed voiding every 2-3 hrs, double voiding, avoidance of bladder irritants such as alcohol, citrus foods, chocolate, caffeinated drinks, energy drinks, spicy foods, sugar, caffeine products, sodas. Instructed patient to avoid drinking fluids 1-2 hours prior to bedtime & void immediately before bedtime.   RTC 6 months with urine cytology and renal ultrasound  Instructed patient if develops any abnormal urologic symptoms notify clinic to be  re-evaluate treated or during after hours go to emergency room versus urgent here.                           [facility]

## 2024-10-16 NOTE — PROGRESS NOTES
Placed in room. Seen by Josue Pires NP. Spoke with patient. Urine sent to lab for cytology. RTC in 6 months with a renal U/S.

## 2024-10-18 DIAGNOSIS — R31.21 ASYMPTOMATIC MICROSCOPIC HEMATURIA: Primary | ICD-10-CM

## 2025-01-08 ENCOUNTER — HOSPITAL ENCOUNTER (OUTPATIENT)
Dept: RADIOLOGY | Facility: HOSPITAL | Age: 65
Discharge: HOME | End: 2025-01-08
Attending: NURSE PRACTITIONER
Payer: MEDICARE

## 2025-01-08 DIAGNOSIS — Z12.31 BREAST CANCER SCREENING BY MAMMOGRAM: ICD-10-CM

## 2025-01-08 PROCEDURE — 77063 BREAST TOMOSYNTHESIS BI: CPT | Mod: TC

## 2025-01-08 PROCEDURE — 77063 BREAST TOMOSYNTHESIS BI: CPT | Mod: 26,,, | Performed by: RADIOLOGY

## 2025-01-08 PROCEDURE — 77067 SCR MAMMO BI INCL CAD: CPT | Mod: 26,,, | Performed by: RADIOLOGY

## 2025-01-24 ENCOUNTER — OFFICE VISIT (OUTPATIENT)
Dept: GYNECOLOGY | Facility: CLINIC | Age: 65
End: 2025-01-24
Payer: MEDICARE

## 2025-01-24 ENCOUNTER — TELEPHONE (OUTPATIENT)
Dept: GYNECOLOGY | Facility: CLINIC | Age: 65
End: 2025-01-24

## 2025-01-24 VITALS
WEIGHT: 212 LBS | HEIGHT: 63 IN | OXYGEN SATURATION: 100 % | SYSTOLIC BLOOD PRESSURE: 132 MMHG | RESPIRATION RATE: 20 BRPM | DIASTOLIC BLOOD PRESSURE: 82 MMHG | HEART RATE: 76 BPM | TEMPERATURE: 99 F | BODY MASS INDEX: 37.56 KG/M2

## 2025-01-24 DIAGNOSIS — N89.8 VAGINAL ITCHING: ICD-10-CM

## 2025-01-24 DIAGNOSIS — N95.0 POSTMENOPAUSAL BLEEDING: ICD-10-CM

## 2025-01-24 DIAGNOSIS — N76.0 BV (BACTERIAL VAGINOSIS): Primary | ICD-10-CM

## 2025-01-24 DIAGNOSIS — B96.89 BV (BACTERIAL VAGINOSIS): Primary | ICD-10-CM

## 2025-01-24 DIAGNOSIS — Z12.4 PAP SMEAR FOR CERVICAL CANCER SCREENING: Primary | ICD-10-CM

## 2025-01-24 PROCEDURE — 88174 CYTOPATH C/V AUTO IN FLUID: CPT | Performed by: NURSE PRACTITIONER

## 2025-01-24 PROCEDURE — G0101 CA SCREEN;PELVIC/BREAST EXAM: HCPCS | Mod: PBBFAC | Performed by: NURSE PRACTITIONER

## 2025-01-24 PROCEDURE — 87210 SMEAR WET MOUNT SALINE/INK: CPT | Performed by: NURSE PRACTITIONER

## 2025-01-24 PROCEDURE — 87624 HPV HI-RISK TYP POOLED RSLT: CPT | Performed by: NURSE PRACTITIONER

## 2025-01-24 PROCEDURE — 99214 OFFICE O/P EST MOD 30 MIN: CPT | Mod: PBBFAC | Performed by: NURSE PRACTITIONER

## 2025-01-24 PROCEDURE — G0101 CA SCREEN;PELVIC/BREAST EXAM: HCPCS | Mod: S$PBB,,, | Performed by: NURSE PRACTITIONER

## 2025-01-24 RX ORDER — METRONIDAZOLE 500 MG/1
500 TABLET ORAL 2 TIMES DAILY
Qty: 14 TABLET | Refills: 0 | Status: SHIPPED | OUTPATIENT
Start: 2025-01-24 | End: 2025-01-31

## 2025-01-24 NOTE — TELEPHONE ENCOUNTER
Spoke to patient to inform of results and medication sent to the pharmacy on file. Educated patient on possible hygiene changes. Patient verbalized understanding.

## 2025-01-24 NOTE — PROGRESS NOTES
[facility] -  Gynecology / Women's Health Clinic      Subjective:       Patient ID: Laina Macias is a 64 y.o. female.    Chief Complaint:  Gynecologic Exam    History of Present Illness  Pt is  here for annual GYN exam. History of tubal pregnancy with unilateral salpingo-oophorectomy, unsure of which side. History of abnormal pap smear in 2019-NIL and HPV positive (16), repeat pap in -NIL and HPV neg. Last pap -NIL and HPV neg. Pt is postmenopausal. Denies hot flashes. Denies abdominal or pelvic pain. Denies discharge. Pt admits to vaginal itching, uses Dial and Ivory unscented soap. Using Premarin for atrophic vaginitis. Pt also admits to noticing spotting when wiping x2 episodes over this last month. Pt is also followed by urology for asymptomatic hematuria. Dip stick positive blood in 2024. Cytology was negative 10/2024. Renal Uls 10/2024 showed mild to moderate left hydronephrosis, unchanged from prior CT exam. Nonobstructing left nephrolithiasis. Simple renal cysts. Simple cysts require no imaging follow-up. Denies history of or concern for STI's. Last mammogram 25-BIRADS 0 d/t dilated ductal segment(s) in the 3:00 left breast needs further evaluation. Pt is scheduled for diagnostic MG on 2/3/25. Denies tobacco use. Colonoscopy in  with polypectomy, repeat in 5 year. DEXA WNL in 2024. Denies fly hx of breast, ovarian, uterine and colon. Pt is followed by IM for primary care.     GYN & OB History  No LMP recorded. Patient is postmenopausal.   Date of Last Pap: 2023    Review of patient's allergies indicates:  No Known Allergies  Past Medical History:   Diagnosis Date    Anemia     Diverticulosis     Hypertension     Personal history of colonic polyps      OB History    Para Term  AB Living   4 3     1     SAB IAB Ectopic Multiple Live Births       1          # Outcome Date GA Lbr Kamlesh/2nd Weight Sex Type Anes PTL Lv   4 Ectopic            3  "Para            2 Para            1 Para                 Review of Systems  Review of Systems    Negative except for pertinent findings for positives per HPI     Objective:    Physical Exam    /82 (BP Location: Left arm, Patient Position: Sitting)   Pulse 76   Temp 98.6 °F (37 °C) (Oral)   Resp 20   Ht 5' 3" (1.6 m)   Wt 96.2 kg (212 lb)   SpO2 100%   BMI 37.55 kg/m²   GENERAL: Well-developed female. No acute distress.    SKIN: Normal to inspection, warm and intact.  BREASTS: No rashes or erythema. No masses, lumps, discharge, tenderness.  VULVA: General appearance normal; external genitalia with no lesions or erythema.  VAGINA: Mucosa/vaginal vault atrophic no abnormal discharge or lesions.  CERVIX: atrophic, nulliparous appearing os, no erythema or abnormal discharge.  BIMANUAL EXAM: reveals a 14 week-sized uterus. The uterus is non tender. Mahad adnexa reveal no tenderness.  PSYCHIATRIC: Patient is oriented to person, place, and time. Mood and affect are normal.    Assessment:         ICD-10-CM ICD-9-CM   1. Pap smear for cervical cancer screening  Z12.4 V76.2   2. Vaginal itching  N89.8 698.1   3. Postmenopausal bleeding  N95.0 627.1     Plan:   Laina was seen today for gynecologic exam.    Diagnoses and all orders for this visit:    Pap smear for cervical cancer screening  -     Liquid-Based Pap Smear, Screening    Vaginal itching  -     Wet Prep, Genital    Postmenopausal bleeding  -     US Pelvis Comp with Transvag NON-OB (xpd; Future    Pap today  Wet prep  Spotting when wiping, followed by urology for hematuria. Pelvic uls ordered, schedule with GYN for EMB, if uls WNL, will cancel GYN. Stop Premarin use at this time.  Continue f/u with urology  Follow up in about 1 year (around 1/24/2026) for annual exam.    "

## 2025-01-28 LAB
HIGH RISK HPV: NEGATIVE
PSYCHE PATHOLOGY RESULT: NORMAL

## 2025-02-03 ENCOUNTER — HOSPITAL ENCOUNTER (OUTPATIENT)
Dept: RADIOLOGY | Facility: HOSPITAL | Age: 65
Discharge: HOME | End: 2025-02-03
Attending: NURSE PRACTITIONER
Payer: MEDICARE

## 2025-02-03 DIAGNOSIS — N95.0 POSTMENOPAUSAL BLEEDING: ICD-10-CM

## 2025-02-03 DIAGNOSIS — R92.8 ABNORMAL FINDINGS ON DIAGNOSTIC IMAGING OF BREAST: ICD-10-CM

## 2025-02-03 PROCEDURE — 76642 ULTRASOUND BREAST LIMITED: CPT | Mod: TC,LT

## 2025-02-03 PROCEDURE — 77061 BREAST TOMOSYNTHESIS UNI: CPT | Mod: 26,LT,, | Performed by: STUDENT IN AN ORGANIZED HEALTH CARE EDUCATION/TRAINING PROGRAM

## 2025-02-03 PROCEDURE — 76642 ULTRASOUND BREAST LIMITED: CPT | Mod: 26,LT,, | Performed by: STUDENT IN AN ORGANIZED HEALTH CARE EDUCATION/TRAINING PROGRAM

## 2025-02-03 PROCEDURE — 76830 TRANSVAGINAL US NON-OB: CPT | Mod: TC

## 2025-02-03 PROCEDURE — 77061 BREAST TOMOSYNTHESIS UNI: CPT | Mod: TC,LT

## 2025-02-03 PROCEDURE — 77065 DX MAMMO INCL CAD UNI: CPT | Mod: 26,LT,, | Performed by: STUDENT IN AN ORGANIZED HEALTH CARE EDUCATION/TRAINING PROGRAM

## 2025-02-19 ENCOUNTER — HOSPITAL ENCOUNTER (OUTPATIENT)
Dept: RADIOLOGY | Facility: HOSPITAL | Age: 65
Discharge: HOME | End: 2025-02-19
Attending: NURSE PRACTITIONER
Payer: MEDICARE

## 2025-02-19 DIAGNOSIS — N63.21 MASS OF UPPER OUTER QUADRANT OF LEFT BREAST: ICD-10-CM

## 2025-02-19 DIAGNOSIS — N63.21 MASS OF UPPER OUTER QUADRANT OF LEFT BREAST: Primary | ICD-10-CM

## 2025-02-19 PROCEDURE — 19083 BX BREAST 1ST LESION US IMAG: CPT | Mod: LT

## 2025-02-19 PROCEDURE — 77061 BREAST TOMOSYNTHESIS UNI: CPT | Mod: TC,LT

## 2025-02-20 ENCOUNTER — TELEPHONE (OUTPATIENT)
Dept: RADIOLOGY | Facility: HOSPITAL | Age: 65
End: 2025-02-20
Payer: MEDICARE

## 2025-02-20 ENCOUNTER — RESULTS FOLLOW-UP (OUTPATIENT)
Dept: RADIOLOGY | Facility: HOSPITAL | Age: 65
End: 2025-02-20
Payer: MEDICARE

## 2025-02-20 LAB
ESTROGEN SERPL-MCNC: NORMAL PG/ML
INSULIN SERPL-ACNC: NORMAL U[IU]/ML
LAB AP CLINICAL INFORMATION: NORMAL
LAB AP GROSS DESCRIPTION: NORMAL
LAB AP REPORT FOOTNOTES: NORMAL

## 2025-02-20 NOTE — PROGRESS NOTES
By phone, I notified Ms Marina of of left breast biopsy results (pathology results are benign and concordant and recommendation (in the absence of symptomatic nipple discharge, recommend return to annual screening mammography with tomosynthesis, next exam due January, 2026) per Dr. Jr Muñoz.  I reinforced that she should contact her provider if she experiences nipple discharge.  All questions were answered and she verbalized understanding of the results.

## 2025-03-13 ENCOUNTER — LAB VISIT (OUTPATIENT)
Dept: LAB | Facility: HOSPITAL | Age: 65
End: 2025-03-13
Attending: NURSE PRACTITIONER
Payer: MEDICARE

## 2025-03-13 DIAGNOSIS — I10 PRIMARY HYPERTENSION: ICD-10-CM

## 2025-03-13 DIAGNOSIS — E78.2 MIXED HYPERLIPIDEMIA: ICD-10-CM

## 2025-03-13 LAB
ANION GAP SERPL CALC-SCNC: 8 MEQ/L
BUN SERPL-MCNC: 16.8 MG/DL (ref 9.8–20.1)
CALCIUM SERPL-MCNC: 9.2 MG/DL (ref 8.4–10.2)
CHLORIDE SERPL-SCNC: 109 MMOL/L (ref 98–107)
CHOLEST SERPL-MCNC: 139 MG/DL
CHOLEST/HDLC SERPL: 4 {RATIO} (ref 0–5)
CO2 SERPL-SCNC: 26 MMOL/L (ref 23–31)
CREAT SERPL-MCNC: 0.79 MG/DL (ref 0.55–1.02)
CREAT/UREA NIT SERPL: 21
GFR SERPLBLD CREATININE-BSD FMLA CKD-EPI: >60 ML/MIN/1.73/M2
GLUCOSE SERPL-MCNC: 113 MG/DL (ref 82–115)
HDLC SERPL-MCNC: 37 MG/DL (ref 35–60)
LDLC SERPL CALC-MCNC: 68 MG/DL (ref 50–140)
POTASSIUM SERPL-SCNC: 3.4 MMOL/L (ref 3.5–5.1)
SODIUM SERPL-SCNC: 143 MMOL/L (ref 136–145)
TRIGL SERPL-MCNC: 170 MG/DL (ref 37–140)
VLDLC SERPL CALC-MCNC: 34 MG/DL

## 2025-03-13 PROCEDURE — 36415 COLL VENOUS BLD VENIPUNCTURE: CPT

## 2025-03-13 PROCEDURE — 80061 LIPID PANEL: CPT

## 2025-03-13 PROCEDURE — 80048 BASIC METABOLIC PNL TOTAL CA: CPT

## 2025-03-17 ENCOUNTER — OFFICE VISIT (OUTPATIENT)
Dept: INTERNAL MEDICINE | Facility: CLINIC | Age: 65
End: 2025-03-17
Payer: MEDICARE

## 2025-03-17 VITALS
HEIGHT: 63 IN | WEIGHT: 211 LBS | TEMPERATURE: 98 F | DIASTOLIC BLOOD PRESSURE: 79 MMHG | SYSTOLIC BLOOD PRESSURE: 138 MMHG | BODY MASS INDEX: 37.39 KG/M2 | RESPIRATION RATE: 18 BRPM | HEART RATE: 60 BPM

## 2025-03-17 DIAGNOSIS — E87.6 HYPOKALEMIA: ICD-10-CM

## 2025-03-17 DIAGNOSIS — E66.01 SEVERE OBESITY (BMI 35.0-39.9) WITH COMORBIDITY: ICD-10-CM

## 2025-03-17 DIAGNOSIS — I10 PRIMARY HYPERTENSION: Primary | ICD-10-CM

## 2025-03-17 DIAGNOSIS — D12.5 ADENOMATOUS POLYP OF SIGMOID COLON: ICD-10-CM

## 2025-03-17 DIAGNOSIS — R19.5 POSITIVE FIT (FECAL IMMUNOCHEMICAL TEST): ICD-10-CM

## 2025-03-17 DIAGNOSIS — Z00.00 WELL ADULT EXAM: ICD-10-CM

## 2025-03-17 DIAGNOSIS — E78.2 MIXED HYPERLIPIDEMIA: ICD-10-CM

## 2025-03-17 DIAGNOSIS — M54.32 SCIATICA OF LEFT SIDE: ICD-10-CM

## 2025-03-17 DIAGNOSIS — R31.21 ASYMPTOMATIC MICROSCOPIC HEMATURIA: ICD-10-CM

## 2025-03-17 DIAGNOSIS — Z12.31 BREAST CANCER SCREENING BY MAMMOGRAM: ICD-10-CM

## 2025-03-17 PROCEDURE — 99213 OFFICE O/P EST LOW 20 MIN: CPT | Mod: PBBFAC | Performed by: NURSE PRACTITIONER

## 2025-03-17 PROCEDURE — 99214 OFFICE O/P EST MOD 30 MIN: CPT | Mod: S$PBB,,, | Performed by: NURSE PRACTITIONER

## 2025-03-17 RX ORDER — ATORVASTATIN CALCIUM 10 MG/1
20 TABLET, FILM COATED ORAL NIGHTLY
Qty: 90 TABLET | Refills: 1 | Status: SHIPPED | OUTPATIENT
Start: 2025-03-17 | End: 2026-03-17

## 2025-03-17 RX ORDER — MELOXICAM 7.5 MG/1
7.5 TABLET ORAL 2 TIMES DAILY PRN
Qty: 60 TABLET | Refills: 3 | Status: SHIPPED | OUTPATIENT
Start: 2025-03-17

## 2025-03-17 RX ORDER — POTASSIUM CHLORIDE 20 MEQ/1
20 TABLET, EXTENDED RELEASE ORAL DAILY
Qty: 90 TABLET | Refills: 1 | Status: SHIPPED | OUTPATIENT
Start: 2025-03-17

## 2025-03-17 RX ORDER — LISINOPRIL AND HYDROCHLOROTHIAZIDE 12.5; 2 MG/1; MG/1
1 TABLET ORAL DAILY
Qty: 90 TABLET | Refills: 1 | Status: SHIPPED | OUTPATIENT
Start: 2025-03-17 | End: 2026-03-17

## 2025-03-17 NOTE — PROGRESS NOTES
Patient ID: [id number]     Chief Complaint: lab results        HPI:     BENJA Macias is a 64 y.o. female here today for a follow up. Pt had left Breast BX done 2-21-25 showing  Specimen to Pathology Breast: [id number]  Order: [id number]   Status: Final result       Next appt: 04/10/2025 at 07:30 AM in Radiology ([facility])       Dx: Mass of upper outer quadrant of left ...    Test Result Released: Yes (not seen)    2 Result Notes       View Follow-Up Encounter      Component  Ref Range & Units (hover)    Case Report   [facility] Surgical Pathology                            Case: [id number]                                 Authorizing Provider:  Dea Campbell FNP    Collected:                                         Ordering Location:     [facility] -       Received:            02/19/2025 09:43 AM                                  Mammography                                                                   Pathologist:           Larisa Santiago MD                                                         Specimen:    Breast, Left, left breast mass 2:00, 1cmfn                                                Clinical Information    Left breast mass 2:00, 1cmfn   Final Diagnosis      Left breast mass 2:00, 1cmfn, Ultrasound-guided needle core biopsy:  - Papilloma with usual ductal hyperplasia and sclerosis.  - No evidence of malignancy.                 Immunizations:   Immunization History   Administered Date(s) Administered    COVID-19 MRNA, LN-S PF (MODERNA HALF 0.25 ML DOSE) 12/14/2021, 07/07/2022    COVID-19, MRNA, LN-S, PF (MODERNA FULL 0.5 ML DOSE) 03/03/2021, 03/31/2021    Influenza - Quadrivalent - PF *Preferred* (6 months and older) 01/18/2023, 11/17/2023    Pneumococcal Conjugate - 20 Valent 09/17/2024    Tdap 06/23/2017        -------------------------------------    Anemia    Diverticulosis    Hypertension    Personal history of colonic polyps        Past Surgical History:   Procedure  Laterality Date     SECTION      COLONOSCOPY  10/21/2019    COLONOSCOPY, WITH POLYPECTOMY USING SNARE N/A 2023    Procedure: COLONOSCOPY, WITH POLYPECTOMY USING SNARE;  Surgeon: Herbert Hurtado MD;  Location: [facility] ENDOSCOPY;  Service: Endoscopy;  Laterality: N/A;    ectopic pregnancy N/A        Review of patient's allergies indicates:  No Known Allergies    Current Outpatient Medications   Medication Instructions    amLODIPine (NORVASC) 10 mg, Oral, Daily    atorvastatin (LIPITOR) 20 mg, Oral, Nightly    conjugated estrogens (PREMARIN) 0.5 g, Vaginal, Twice weekly, Apply dime size to gloved finger and apply to vaginal walls nightly 2x/week.    hydroCHLOROthiazide (HYDRODIURIL) 12.5 MG Tab TAKE ONE TABLET BY MOUTH EVERY DAY    lisinopriL (PRINIVIL,ZESTRIL) 20 mg, Oral, Daily    meloxicam (MOBIC) 7.5 mg, Oral, 2 times daily PRN    potassium chloride SA (K-DUR,KLOR-CON) 20 MEQ tablet 20 mEq, Oral, Daily    tiZANidine (ZANAFLEX) 4 mg, Oral, Every 6 hours PRN       Social History[1]     Family History   Problem Relation Name Age of Onset    Diabetes type II Mother      Hypertension Mother          Patient Care Team:  Dea Campbell FNP as PCP - General (Family Medicine)  Stephanie Perry LPN as Care Coordinator  Thu Shine, RN as Registered Nurse (Radiology)     Subjective:     Review of Systems     See HPI for details    Constitutional: Denies Change in appetite. Denies Chills. Denies Fever. Denies Night sweats.  Eye: Denies Blurred vision. Denies Discharge. Denies Eye pain.  ENT: Denies Decreased hearing. Denies Sore throat. Denies Swollen glands.  Respiratory: Denies Cough. Denies Shortness of breath. Denies Shortness of breath with exertion. Denies Wheezing.  Cardiovascular: DeniesChest pain at rest. Denies Chest pain with exertion. Denies Irregular heartbeat. Denies Palpitations. Denies Edema.  Gastrointestinal: Denies Abdominal pain. DeniesDiarrhea. Denies Nausea. Denies Vomiting.  "Denies Hematemesis or Hematochezia.  Genitourinary: Denies Dysuria. Denies Urinary frequency. Denies Urinary urgency. Denies Blood in urine.  Endocrine: Denies Cold intolerance. Denies Excessive thirst. Denies Heat intolerance. Denies Weight loss. Denies Weight gain.  Musculoskeletal: Denies Painful joints. Denies Weakness.  Integumentary: Denies Rash. Denies Itching. Denies Dry skin.  Neurologic: Denies Dizziness. Denies Fainting. Denies Headache.  Psychiatric: Denies Depression. Denies Anxiety. Denies Suicidal/Homicidal ideations.    All Other ROS: Negative except as stated in HPI.       Objective:     Visit Vitals  /79 (BP Location: Right arm, Patient Position: Sitting)   Pulse 60   Temp 98.4 °F (36.9 °C) (Oral)   Resp 18   Ht 5' 3" (1.6 m)   Wt 95.7 kg (211 lb)   BMI 37.38 kg/m²       Physical Exam    General: Alert and oriented, No acute distress.  Head: Normocephalic, Atraumatic.  Eye: Pupils are equal, round and reactive to light, Extraocular movements are intact, Sclera non-icteric.  Ears/Nose/Throat: Normal, Mucosa moist,Clear.  Neck/Thyroid: Supple, Non-tender, No carotid bruit, No lymphadenopathy, No JVD, Full range of motion.  Respiratory: Clear to auscultation bilaterally; No wheezes, rales or rhonchi,Non-labored respirations, Symmetrical chest wall expansion.  Cardiovascular: Regular rate and rhythm, S1/S2 normal, No murmurs, rubs or gallops.  Gastrointestinal: Soft, Non-tender, Non-distended, Normal bowel sounds, No palpable organomegaly.  Musculoskeletal: Normal range of motion.  Integumentary: Warm, Dry, Intact, No suspicious lesions or rashes.  Extremities: No clubbing, cyanosis or edema  Neurologic: No focal deficits, Cranial Nerves II-XII are grossly intact, Motor strength normal upper and lower extremities, Sensory exam intact.  Psychiatric: Normal interaction, Coherent speech, Euthymic mood, Appropriate affect       Labs Reviewed:     Chemistry:  Lab Results   Component Value Date    NA " 143 03/13/2025    K 3.4 (L) 03/13/2025    BUN 16.8 03/13/2025    CREATININE 0.79 03/13/2025    EGFRNORACEVR >60 03/13/2025    GLUCOSE 113 03/13/2025    CALCIUM 9.2 03/13/2025    ALKPHOS 55 09/12/2024    LABPROT 7.4 09/12/2024    ALBUMIN 3.5 09/12/2024    BILIDIR 0.1 05/08/2021    IBILI 0.30 05/08/2021    AST 15 09/12/2024    ALT 16 09/12/2024    AXBYFCSD57HF 39.3 05/08/2021        Lab Results   Component Value Date    HGBA1C 5.5 02/21/2024        Hematology:  Lab Results   Component Value Date    WBC 7.52 09/12/2024    HGB 14.6 09/12/2024    HCT 41.8 09/12/2024     09/12/2024       Lipid Panel:  Lab Results   Component Value Date    CHOL 139 03/13/2025    HDL 37 03/13/2025    LDL 68.00 03/13/2025    TRIG 170 (H) 03/13/2025    TOTALCHOLEST 4 03/13/2025        Urine:  Lab Results   Component Value Date    APPEARANCEUA Clear 08/08/2023    SGUA 1.016 08/08/2023    PROTEINUA 2+ (A) 08/08/2023    KETONESUA Negative 08/08/2023    LEUKOCYTESUR Negative 08/08/2023    RBCUA 6-10 (A) 08/08/2023    WBCUA 0-5 08/08/2023    BACTERIA None Seen 08/08/2023    SQEPUA Trace (A) 08/08/2023    HYALINECASTS None Seen 08/08/2023        Assessment:       ICD-10-CM ICD-9-CM   1. Primary hypertension  I10 401.9   2. Mixed hyperlipidemia  E78.2 272.2   3. Hypokalemia  E87.6 276.8   4. Positive FIT (fecal immunochemical test)  R19.5 792.1   5. Severe obesity (BMI 35.0-39.9) with comorbidity  E66.01 278.01   6. Asymptomatic microscopic hematuria  R31.21 599.72   7. Adenomatous polyp of sigmoid colon  D12.5 211.3   8. Well adult exam  Z00.00 V70.0   9. Breast cancer screening by mammogram  Z12.31 V76.12   10. Sciatica of left side  M54.32 724.3        Plan:     1. Primary hypertension (Primary)  Low fat low salt diet and exercise. Cont Amlodipine, HCTZ, Lisinopril as prescribed.     2. Mixed hyperlipidemia  Low fat diet and exercise. Cont Atorvastatin to 20 mg 1 tab po Q hs. FLP in 6 months.     3. Hypokalemia  Potassium 3.4. Encouraged  potassium rich foods in diet.     4. Positive FIT (fecal immunochemical test)  UTD colonoscopy done 1-26-23 polyps X 4 no malignancy- repeat in 3 years (1/2026).      5. Severe obesity (BMI 35.0-39.9) with comorbidity  Low fat diet and exercise encouraged. Education provided.     6. Asymptomatic microscopic hematuria  Low fat diet and exercise encouraged. Education provided.     7. Adenomatous polyp of sigmoid colon  UTD colonoscopy done 1-26-23 polyps X 4 no malignancy- repeat in 3 years (1/2026). UTD colonoscopy done 1-26-23 polyps X 4 no malignancy- repeat in 3 years (1/2026).      8. Well adult exam  Labs in 4 months. Keep GYN cl appt. UTD MMG. UTD colonoscopy done 1-26-23 polyps X 4 no malignancy- repeat in 3 years (1/2026). UTD colonoscopy done 1-26-23 polyps X 4 no malignancy- repeat in 3 years (1/2026).     9. Breast cancer screening by mammogram  UTD MMG.     10. Sciatica left side  Cont Meloxicam and Tizanidine as prescribed.     Follow up in about 4 months (around 7/17/2025) for with labs 1 week prior to appt. . In addition to their scheduled follow up, the patient has also been instructed to follow up on as needed basis.     Future Appointments   Date Time Provider Department Center   4/10/2025  7:30 AM [address] [place]    4/21/2025  8:30 AM Jatinder Pires, [place] [facility] [place]    5/5/2025  9:30 AM [facility], [facility] GYN [facility] GYN [place]    1/26/2026  8:10 AM Lilia Medrano ANP [facility] GYN [place]        ROSA Callahan             [1]   Social History  Socioeconomic History    Marital status: Single   Tobacco Use    Smoking status: Never     Passive exposure: Never    Smokeless tobacco: Never   Substance and Sexual Activity    Alcohol use: Yes     Comment: holidays    Drug use: Never    Sexual activity: Not Currently     Partners: Male     Social Drivers of Health     Financial Resource Strain: High Risk (3/17/2025)    Overall Financial Resource Strain (CARDIA)     Difficulty of  Paying Living Expenses: Hard   Food Insecurity: Food Insecurity Present (3/17/2025)    Hunger Vital Sign     Worried About Running Out of Food in the Last Year: Sometimes true     Ran Out of Food in the Last Year: Never true   Transportation Needs: No Transportation Needs (3/17/2025)    PRAPARE - Transportation     Lack of Transportation (Medical): No     Lack of Transportation (Non-Medical): No   Physical Activity: Sufficiently Active (3/17/2025)    Exercise Vital Sign     Days of Exercise per Week: 5 days     Minutes of Exercise per Session: 30 min   Stress: Stress Concern Present (3/17/2025)    [employer] - Occupational Stress Questionnaire     Feeling of Stress : Rather much   Housing Stability: Low Risk  (3/17/2025)    Housing Stability Vital Sign     Unable to Pay for Housing in the Last Year: No     Number of Times Moved in the Last Year: 0     Homeless in the Last Year: No

## 2025-03-31 DIAGNOSIS — E78.2 MIXED HYPERLIPIDEMIA: ICD-10-CM

## 2025-04-01 NOTE — TELEPHONE ENCOUNTER
Returned patient's call. Patient informed me she was confused about her medications. I went over each medication dosage with patient. Patient verified understanding how and when to take each of her medications.

## 2025-04-09 RX ORDER — ATORVASTATIN CALCIUM 20 MG/1
20 TABLET, FILM COATED ORAL NIGHTLY
Qty: 90 TABLET | OUTPATIENT
Start: 2025-04-09

## 2025-04-10 ENCOUNTER — HOSPITAL ENCOUNTER (OUTPATIENT)
Dept: RADIOLOGY | Facility: HOSPITAL | Age: 65
Discharge: HOME | End: 2025-04-10
Attending: NURSE PRACTITIONER
Payer: MEDICARE

## 2025-04-10 DIAGNOSIS — R31.21 ASYMPTOMATIC MICROSCOPIC HEMATURIA: ICD-10-CM

## 2025-04-10 PROCEDURE — 76770 US EXAM ABDO BACK WALL COMP: CPT | Mod: TC

## 2025-04-21 ENCOUNTER — OFFICE VISIT (OUTPATIENT)
Dept: UROLOGY | Facility: CLINIC | Age: 65
End: 2025-04-21
Payer: MEDICARE

## 2025-04-21 VITALS
TEMPERATURE: 98 F | SYSTOLIC BLOOD PRESSURE: 131 MMHG | OXYGEN SATURATION: 100 % | WEIGHT: 208 LBS | BODY MASS INDEX: 36.86 KG/M2 | RESPIRATION RATE: 19 BRPM | DIASTOLIC BLOOD PRESSURE: 83 MMHG | HEIGHT: 63 IN | HEART RATE: 65 BPM

## 2025-04-21 DIAGNOSIS — R82.90 ABNORMAL URINE: ICD-10-CM

## 2025-04-21 DIAGNOSIS — N28.1 RENAL CYST: ICD-10-CM

## 2025-04-21 DIAGNOSIS — R31.21 ASYMPTOMATIC MICROSCOPIC HEMATURIA: Primary | ICD-10-CM

## 2025-04-21 PROCEDURE — 99215 OFFICE O/P EST HI 40 MIN: CPT | Mod: PBBFAC,25 | Performed by: NURSE PRACTITIONER

## 2025-04-21 PROCEDURE — 99213 OFFICE O/P EST LOW 20 MIN: CPT | Mod: S$PBB,,, | Performed by: NURSE PRACTITIONER

## 2025-04-21 PROCEDURE — 88108 CYTOPATH CONCENTRATE TECH: CPT | Mod: TC | Performed by: NURSE PRACTITIONER

## 2025-04-21 PROCEDURE — 81001 URINALYSIS AUTO W/SCOPE: CPT | Mod: PBBFAC | Performed by: NURSE PRACTITIONER

## 2025-04-21 PROCEDURE — 87077 CULTURE AEROBIC IDENTIFY: CPT | Mod: 91 | Performed by: NURSE PRACTITIONER

## 2025-04-21 NOTE — PROGRESS NOTES
Patient seen by Josue Pires NP. Patient instructed to RTC in 6 months with Renal US and 1 week virtual for results. Cytology today.

## 2025-04-21 NOTE — PROGRESS NOTES
Chief Complaint:   Chief Complaint   Patient presents with    Follow-up       HPI:   Patient is a 63 year old female here fo six-month follow-up hematuria and kidney stones.    Patient seen by PCP Cheyanne Rider NP with persistent microscopic hematuria.    Patient also has a history of kidney stones in the past she was seen by Dr. Auguste with a renal ultrasound in  that noted mild hydronephrosis right renal cysts and diminishing renal stones in the left kidney.    Patient underwent a recent cystoscope with Dr. Chacon on 10/18/2023 negative results from cystoscope however will monitor hematuria with urine cytology, renal stones and cysts with renal ultrasound in 6 months.  Today patient presents without any abnormal urologic symptoms and recent renal ultrasound we will no system stones present sending patient for a urine cytology will schedule a appointment in 1 week to discuss cytology results.  Renal ultrasound as noted below assessing patient's renal function greater than 60% currently with recent BMP.      Allergies:  Review of patient's allergies indicates:  No Known Allergies    Medications:  Current Medications[1]    Review of Systems:  General: No fever, chills, fatigability, or weight loss.  Skin: No rashes, itching, or changes in color or texture of skin.  Chest: Denies NYE, cyanosis, wheezing, cough, and sputum production.  Abdomen: Appetite fine. No weight loss. Denies diarrhea, abdominal pain, hematemesis, or blood in stool.  Musculoskeletal: No joint stiffness or swelling. Denies back pain.  : As above.  All other review of systems negative.    PMH:  Past Medical History:   Diagnosis Date    Anemia     Diverticulosis     Hypertension     Personal history of colonic polyps        PSH:  Past Surgical History:   Procedure Laterality Date     SECTION      COLONOSCOPY  10/21/2019    COLONOSCOPY, WITH POLYPECTOMY USING SNARE N/A 2023    Procedure: COLONOSCOPY, WITH POLYPECTOMY USING SNARE;   Surgeon: Herbert Hurtado MD;  Location: [facility] ENDOSCOPY;  Service: Endoscopy;  Laterality: N/A;    ectopic pregnancy N/A        FamHx:  Family History   Problem Relation Name Age of Onset    Diabetes type II Mother Molly Macias     Hypertension Mother Molly Macias     Diabetes Mother Molly Macias        SocHx:  Social History[2]    Physical Exam:  Vitals:    04/21/25 0816   BP: 131/83   Pulse:    Resp:    Temp:      General: A&Ox3, no apparent distress, no deformities  Neck: No masses, normal thyroid  Lungs: CTA shannon, no use of accessory muscles  Heart: RRR, no arrhythmias  Abdomen: Soft, NT, ND, no masses, no hernias, no hepatosplenomegaly  Lymphatic: Neck and groin nodes negative  Skin: The skin is warm and dry. No jaundice.  Ext: No c/c/e.      Urinalysis:  Results for orders placed or performed in visit on 04/21/25   POCT URINE DIPSTICK WITH MICROSCOPE, AUTOMATED   Result Value Ref Range    Color, UA Yellow     Spec Grav UA 1.020     pH, UA 6.0     WBC, UA small     Nitrite, UA neg     Protein, POC >=300     Glucose, UA neg     Ketones, UA neg     Urobilinogen, UA 0.2     Bilirubin, POC neg     Blood, UA small    Microscopic urinalysis reveals small RBCs 1 per HPF, small WBCs 1-4 per HPF, bacteria moderate per HPF, negative nitrites.      Imaging:  Renal ultrasound on 04/03/2025 revealed no stones or cysts noted mild parenchymal the kind related to renal disease.      Impression:  1. Asymptomatic microscopic hematuria  - POCT URINE DIPSTICK WITH MICROSCOPE, AUTOMATED      Plan:  Instructed patient to schedule patient for a 1 week virtual visit to discuss urine cytology results.  Instructed patient sending urine for culture will notify her of results and treat accordingly.  Increase fluid intake, limit salt in diet, limit protein to 30%, intake adequate calcium, decrease brand, soy, Beets, Almonds, Rhubard, Buckwheat flour, baked potato, raspberry, potato chips Spinach, Miso, Tahini, sesame, Swiss  Adolph. Instructed patient on Avoiding bladder irritants, such as alcohol, citrus drinks or foods,salty foods, energy drinks, spicy foods, caffeine, sodas.   RTC 6 months with urine cytology and renal ultrasound.  Instructed patient if develops any abnormal urologic symptoms notify clinic to be re-evaluate treated or during after hours go to emergency room versus urgent here.                           [facility]         [1]   Current Outpatient Medications   Medication Sig Dispense Refill    amLODIPine (NORVASC) 10 MG tablet Take 1 tablet (10 mg total) by mouth once daily. 90 tablet 1    atorvastatin (LIPITOR) 10 MG tablet Take 2 tablets (20 mg total) by mouth every evening. 90 tablet 1    lisinopriL-hydrochlorothiazide (PRINZIDE,ZESTORETIC) 20-12.5 mg per tablet Take 1 tablet by mouth once daily. 90 tablet 1    meloxicam (MOBIC) 7.5 MG tablet Take 1 tablet (7.5 mg total) by mouth 2 (two) times daily as needed for Pain. 60 tablet 3    potassium chloride SA (K-DUR,KLOR-CON) 20 MEQ tablet Take 1 tablet (20 mEq total) by mouth once daily. 90 tablet 1    conjugated estrogens (PREMARIN) vaginal cream Place 0.5 g vaginally twice a week. Apply dime size to gloved finger and apply to vaginal walls nightly 2x/week. (Patient not taking: Reported on 4/21/2025) 30 g 6    tiZANidine (ZANAFLEX) 4 MG tablet Take 1 tablet (4 mg total) by mouth every 6 (six) hours as needed (muscle spasm). (Patient not taking: Reported on 1/24/2025) 90 tablet 1     No current facility-administered medications for this visit.   [2]   Social History  Socioeconomic History    Marital status: Single   Tobacco Use    Smoking status: Never     Passive exposure: Never    Smokeless tobacco: Never   Substance and Sexual Activity    Alcohol use: Yes     Comment: holidays    Drug use: Never    Sexual activity: Not Currently     Partners: Male     Social Drivers of Health     Financial Resource Strain: High Risk (3/17/2025)    Overall Financial Resource Strain (CARDIA)      Difficulty of Paying Living Expenses: Hard   Food Insecurity: Food Insecurity Present (3/17/2025)    Hunger Vital Sign     Worried About Running Out of Food in the Last Year: Sometimes true     Ran Out of Food in the Last Year: Never true   Transportation Needs: No Transportation Needs (3/17/2025)    PRAPARE - Transportation     Lack of Transportation (Medical): No     Lack of Transportation (Non-Medical): No   Physical Activity: Sufficiently Active (3/17/2025)    Exercise Vital Sign     Days of Exercise per Week: 5 days     Minutes of Exercise per Session: 30 min   Stress: Stress Concern Present (3/17/2025)    [employer] - Occupational Stress Questionnaire     Feeling of Stress : Rather much   Housing Stability: Low Risk  (3/17/2025)    Housing Stability Vital Sign     Unable to Pay for Housing in the Last Year: No     Number of Times Moved in the Last Year: 0     Homeless in the Last Year: No

## 2025-04-22 LAB
ESTROGEN SERPL-MCNC: NORMAL PG/ML
INSULIN SERPL-ACNC: NORMAL U[IU]/ML
LAB AP GROSS DESCRIPTION: NORMAL
LAB AP URINE CYTOLOGY INTERPRETATION SPECIMEN 1: NORMAL

## 2025-04-24 ENCOUNTER — TELEPHONE (OUTPATIENT)
Dept: UROLOGY | Facility: CLINIC | Age: 65
End: 2025-04-24
Payer: MEDICARE

## 2025-04-24 DIAGNOSIS — N30.01 ACUTE CYSTITIS WITH HEMATURIA: Primary | ICD-10-CM

## 2025-04-24 LAB — BACTERIA UR CULT: ABNORMAL

## 2025-04-24 RX ORDER — SULFAMETHOXAZOLE AND TRIMETHOPRIM 800; 160 MG/1; MG/1
1 TABLET ORAL 2 TIMES DAILY
Qty: 14 TABLET | Refills: 0 | Status: SHIPPED | OUTPATIENT
Start: 2025-04-24

## 2025-04-24 NOTE — TELEPHONE ENCOUNTER
Spoke to patient regarding urine culture positive for E coli susceptible to Bactrim DS 1 tab p.o. b.i.d. times 7 days we will repeat urine culture in 14 days and notify patient results when completed.

## 2025-04-29 ENCOUNTER — OFFICE VISIT (OUTPATIENT)
Dept: UROLOGY | Facility: CLINIC | Age: 65
End: 2025-04-29
Payer: MEDICARE

## 2025-04-29 DIAGNOSIS — R31.21 ASYMPTOMATIC MICROSCOPIC HEMATURIA: Primary | ICD-10-CM

## 2025-04-29 NOTE — PROGRESS NOTES
Established Patient - Audio Only Telehealth Visit     The patient location is:  Home  The chief complaint leading to consultation is:  Asymptomatic hematuria  Visit type: Virtual visit with audio only (telephone)  Total time spent with patient:  20  minutes  Total time spent Medical decision-making with patient: 15 minutes    The reason for the audio only service rather than synchronous audio virtual visit was related to technical difficulties or patient preference/necessity.     Each patient to whom I provide medical services by telemedicine is:  (1) informed of the relationship between the physician and patient and the respective role of any other health care provider with respect to management of the patient; and (2) notified that they may decline to receive medical services by telemedicine and may withdraw from such care at any time. Patient verbally consented to receive this service via voice-only telephone call.     This service was not originating from a related E/M service provided within the previous 7 days nor will  to an E/M service or procedure within the next 24 hours or my soonest available appointment.  Prevailing standard of care was able to be met in this audio-only visit.  Chief Complaint: Asymptomatic hematuria      HPI:   Patient is a 63 year old female here on this audio virtual visit for hematuria and urine cytology results.    Patient seen by PCP Cheyanne Rider NP with persistent microscopic hematuria.    Patient also has a history of kidney stones in the past she was seen by Dr. Auguste with a renal ultrasound in 2021 that noted mild hydronephrosis right renal cysts and diminishing renal stones in the left kidney.    Patient underwent a recent cystoscope with Dr. Chacon on 10/18/2023 negative results from cystoscope however will monitor hematuria with urine cytology, renal stones and cysts with renal ultrasound in 6 months.  Today I discuss with patient urine cytology results of  negative for malignancy or urothelial carcinoma.  Patient also stated she is feeling much better now that she was started the Bactrim she has 2 days left for treatment of her UTI.  Instructed patient to follow up in 6 months with urine cytology.      Allergies:  Review of patient's allergies indicates:  No Known Allergies    Medications:  Current Medications[1]    Review of Systems:  General: No fever, chills, fatigability, or weight loss.  Skin: No rashes, itching, or changes in color or texture of skin.  Chest: Denies NYE, cyanosis, wheezing, cough, and sputum production.  Abdomen: Appetite fine. No weight loss. Denies diarrhea, abdominal pain, hematemesis, or blood in stool.  Musculoskeletal: No joint stiffness or swelling. Denies back pain.  : As above.  All other review of systems negative.    PMH:  Past Medical History:   Diagnosis Date    Anemia     Diverticulosis     Hypertension     Personal history of colonic polyps        PSH:  Past Surgical History:   Procedure Laterality Date     SECTION      COLONOSCOPY  10/21/2019    COLONOSCOPY, WITH POLYPECTOMY USING SNARE N/A 2023    Procedure: COLONOSCOPY, WITH POLYPECTOMY USING SNARE;  Surgeon: Herbert Hurtado MD;  Location: [facility] ENDOSCOPY;  Service: Endoscopy;  Laterality: N/A;    ectopic pregnancy N/A        FamHx:  Family History   Problem Relation Name Age of Onset    Diabetes type II Mother Molly Macias     Hypertension Mother Molly Macias     Diabetes Mother Molly Macias        SocHx:  Social History[2]    Physical Exam:  There were no vitals filed for this visit.      Labs:   Final Diagnosis      Urine, clean catch:  - Acute inflammation and numerous bacteria.  - Negative for malignancy.        Electronically signed by Larisa Santiago MD on 2025 at 2:05 PM    Gross Description    1. Urine, Clean Catch:   Received fresh is 30 ml of clear yellow fluid submitted for cytospin preparation.    Specimen 1 Interpretation    Negative for high grade urothelial carcinoma   Electronically signed by Larisa Santiago MD on 4/22/2025 at 2:05 PM         Impression:  Microscopic hematuria  UTI  There are no diagnoses linked to this encounter.    Plan:  Instructed patient to continue Bactrim DS for completion of 1 week for UTI.  Instructed patient on timed voiding every 2-3 hrs, double voiding, avoidance of bladder irritants such as alcohol, citrus foods, chocolate, caffeinated drinks, energy drinks, spicy foods, sugar, caffeine products, sodas. Instructed patient to avoid drinking fluids 1-2 hours prior to bedtime & void immediately before bedtime.   RTC 6 months with urine cytology.  Instructed patient if develops any abnormal urologic symptoms notify clinic to be re-evaluate treated or during after hours go to emergency room versus urgent here.                           [facility]         [1]   Current Outpatient Medications   Medication Sig Dispense Refill    amLODIPine (NORVASC) 10 MG tablet Take 1 tablet (10 mg total) by mouth once daily. 90 tablet 1    atorvastatin (LIPITOR) 10 MG tablet Take 2 tablets (20 mg total) by mouth every evening. 90 tablet 1    conjugated estrogens (PREMARIN) vaginal cream Place 0.5 g vaginally twice a week. Apply dime size to gloved finger and apply to vaginal walls nightly 2x/week. (Patient not taking: Reported on 4/21/2025) 30 g 6    lisinopriL-hydrochlorothiazide (PRINZIDE,ZESTORETIC) 20-12.5 mg per tablet Take 1 tablet by mouth once daily. 90 tablet 1    meloxicam (MOBIC) 7.5 MG tablet Take 1 tablet (7.5 mg total) by mouth 2 (two) times daily as needed for Pain. 60 tablet 3    potassium chloride SA (K-DUR,KLOR-CON) 20 MEQ tablet Take 1 tablet (20 mEq total) by mouth once daily. 90 tablet 1    sulfamethoxazole-trimethoprim 800-160mg (BACTRIM DS) 800-160 mg Tab Take 1 tablet by mouth 2 (two) times daily. 14 tablet 0    tiZANidine (ZANAFLEX) 4 MG tablet Take 1 tablet (4 mg total) by mouth every 6 (six) hours as  needed (muscle spasm). (Patient not taking: Reported on 1/24/2025) 90 tablet 1     No current facility-administered medications for this visit.   [2]   Social History  Socioeconomic History    Marital status: Single   Tobacco Use    Smoking status: Never     Passive exposure: Never    Smokeless tobacco: Never   Substance and Sexual Activity    Alcohol use: Yes     Comment: holidays    Drug use: Never    Sexual activity: Not Currently     Partners: Male     Social Drivers of Health     Financial Resource Strain: High Risk (3/17/2025)    Overall Financial Resource Strain (CARDIA)     Difficulty of Paying Living Expenses: Hard   Food Insecurity: Food Insecurity Present (3/17/2025)    Hunger Vital Sign     Worried About Running Out of Food in the Last Year: Sometimes true     Ran Out of Food in the Last Year: Never true   Transportation Needs: No Transportation Needs (3/17/2025)    PRAPARE - Transportation     Lack of Transportation (Medical): No     Lack of Transportation (Non-Medical): No   Physical Activity: Sufficiently Active (3/17/2025)    Exercise Vital Sign     Days of Exercise per Week: 5 days     Minutes of Exercise per Session: 30 min   Stress: Stress Concern Present (3/17/2025)    [employer] [place] [employer] - Occupational Stress Questionnaire     Feeling of Stress : Rather much   Housing Stability: Low Risk  (3/17/2025)    Housing Stability Vital Sign     Unable to Pay for Housing in the Last Year: No     Number of Times Moved in the Last Year: 0     Homeless in the Last Year: No

## 2025-05-05 ENCOUNTER — OFFICE VISIT (OUTPATIENT)
Dept: GYNECOLOGY | Facility: CLINIC | Age: 65
End: 2025-05-05
Payer: MEDICARE

## 2025-05-05 VITALS
TEMPERATURE: 99 F | WEIGHT: 206 LBS | HEART RATE: 57 BPM | OXYGEN SATURATION: 100 % | DIASTOLIC BLOOD PRESSURE: 84 MMHG | BODY MASS INDEX: 36.5 KG/M2 | RESPIRATION RATE: 18 BRPM | HEIGHT: 63 IN | SYSTOLIC BLOOD PRESSURE: 136 MMHG

## 2025-05-05 DIAGNOSIS — N95.0 POSTMENOPAUSAL BLEEDING: Primary | ICD-10-CM

## 2025-05-05 PROCEDURE — 99213 OFFICE O/P EST LOW 20 MIN: CPT | Mod: PBBFAC,25

## 2025-05-05 PROCEDURE — 58100 BIOPSY OF UTERUS LINING: CPT | Mod: PBBFAC | Performed by: STUDENT IN AN ORGANIZED HEALTH CARE EDUCATION/TRAINING PROGRAM

## 2025-05-05 PROCEDURE — 88305 TISSUE EXAM BY PATHOLOGIST: CPT | Mod: TC

## 2025-05-05 NOTE — PROGRESS NOTES
[facility] GYNECOLOGY CLINIC NOTE     Laina Macias is a 64 y.o.  presenting to GYN clinic for postmenopausal bleeding.     Last seen by GYN NP 2026 and reported vaginal spotting x2. Pelvic US showed a 9mm endometrial stripe. She had colonoscopy in , next due in . She follows with Urology for asymptomatic hematuria, had a negative cystoscopy and urine cytology showing no malignancy, was treated for a UTI. Had NILM HPV negative pap 2025. Denies sexual activity. Was treated for BV on wet prep.         OB History          4    Para   3    Term   3       0    AB   1    Living   3         SAB   0    IAB   0    Ectopic   1    Multiple   0    Live Births   3             CSx1  Ectopic x1 (open abdominal surgery)     GYN- denies h/o STDs. Many years since menopause.    Past Medical History:   Diagnosis Date    Anemia     Diverticulosis     Hypertension     Personal history of colonic polyps     PMB (postmenopausal bleeding)       Past Surgical History:   Procedure Laterality Date     SECTION      x3    COLONOSCOPY  10/21/2019    COLONOSCOPY, WITH POLYPECTOMY USING SNARE N/A 2023    Procedure: COLONOSCOPY, WITH POLYPECTOMY USING SNARE;  Surgeon: Herbert Hurtado MD;  Location: [facility] ENDOSCOPY;  Service: Endoscopy;  Laterality: N/A;    ectopic pregnancy N/A     open    TUBAL LIGATION        Current Outpatient Medications   Medication Instructions    amLODIPine (NORVASC) 10 mg, Oral, Daily    atorvastatin (LIPITOR) 20 mg, Oral, Nightly    conjugated estrogens (PREMARIN) 0.5 g, Vaginal, Twice weekly, Apply dime size to gloved finger and apply to vaginal walls nightly 2x/week.    lisinopriL-hydrochlorothiazide (PRINZIDE,ZESTORETIC) 20-12.5 mg per tablet 1 tablet, Oral, Daily    meloxicam (MOBIC) 7.5 mg, Oral, 2 times daily PRN    potassium chloride SA (K-DUR,KLOR-CON) 20 MEQ tablet 20 mEq, Oral, Daily    sulfamethoxazole-trimethoprim 800-160mg (BACTRIM DS) 800-160 mg Tab 1 tablet,  "Oral, 2 times daily    tiZANidine (ZANAFLEX) 4 mg, Oral, Every 6 hours PRN     Social History[1]    Review of Systems  Pertinent items are noted in HPI.     Objective:     /84   Pulse (!) 57   Temp 98.5 °F (36.9 °C) (Oral)   Resp 18   Ht 5' 3" (1.6 m)   Wt 93.4 kg (206 lb)   SpO2 100%   BMI 36.49 kg/m²   Physical Exam:  Gen: Well-nourished, well-developed female appearing stated age. Alert, cooperative, in no acute distress.  CV: regular rate  Chest: no increased WOB  Abdomen: Soft, non-tender, no masses.  Incisions: midline vertical hypogastric scar  Extrem: Extremities normal, atraumatic, non-tender calves.  External genitalia: Normal female genetalia without lesion, discharge or tenderness.  Speculum Exam: Vaginal vault without discharge, nonodorous, no lesions/masses seen.  Cervical os visualized as closed, small polyp seen in endocervix at os.  Bimanual Exam: No cervical motion tenderness.  Uterus freely mobile, anteverted. Normal size uterus. No adnexal masses.  Nontender exam.   Note: RN chaperone present for entirety of exam.    See procedure note for EMB    Relevant Imaging:  FINDINGS:  Uterus measures 9.1 x 3.9 x 4.9 cm.  The endometrial stripe is 9 mm which is thickened in a postmenopausal patient cannot rule out polyp.  There is an echogenic lesion in the myometrium felt to represent calcification.  There are nabothian cyst present.  The right adnexa was evaluated the ovary is not identified.  The left adnexa was evaluated the ovary is not identified     Impression:  Thickened endometrial stripe for postmenopausal patient would consider an endometrial biopsy.    Assessment:       64 y.o.  here for evaluation of PMB..  1. Postmenopausal bleeding  Specimen to Pathology Gynecology and Obstetrics        Plan:     Problem List Items Addressed This Visit    None  Visit Diagnoses         Postmenopausal bleeding    -  Primary    Relevant Orders    Specimen to Pathology Gynecology and " Obstetrics            Will call with pathology results    Discussed patient and plan with  Rogelio    Taylor Conrad MD  [employer] OB/GYN PGY4         [1]   Social History  Tobacco Use    Smoking status: Never     Passive exposure: Never    Smokeless tobacco: Never   Substance Use Topics    Alcohol use: Yes     Comment: holidays    Drug use: Never

## 2025-05-05 NOTE — PROCEDURES
Endometrial biopsy    Date/Time: 5/5/2025 9:30 AM    Performed by: Taylor Conrad MD  Authorized by: Taylor Conrad MD    Consent:     Prior to procedure the appropriate consent was completed and verified      Consent given by:  Patient    Patient questions answered: yes      Patient agrees, verbalizes understanding, and wants to proceed: yes      Instructions and paperwork completed: yes    Indication:     Indications: Post-menopausal bleeding    Pre-procedure:     Pre-procedure timeout performed: yes    Procedure:     Procedure: endometrial biopsy with Pipelle      Cervix cleaned and prepped: yes      Use of single-tooth tenaculum: yes      Uterus sounded: yes      Uterus sound depth (cm):  9    Curettes used: one pass performed with return of tissue.    Specimen collected: specimen collected and sent to pathology      Patient tolerated procedure well with no complications: yes    Comments:     Procedure comments:  Small polyp noted at cervical os, After cervical prep with betadine, it was grasped with ring forceps and removed, sent as a separate specimen.    After pass with Pipelle was completed, good hemostasis was noted with application of Monsels and pressure with proctoswab at cervical os.

## 2025-05-05 NOTE — LETTER
May 5, 2025      [facility]  [address] [facility] [address]  Phone: [phone]       Patient: Laina Macias   YOB: 1960  Date of Visit: 05/05/2025    To Whom It May Concern:    LASHAUN Macias  was at [facility] on 05/05/2025. The patient may return to work/school on 05/05/2025 with no restrictions. If you have any questions or concerns, or if I can be of further assistance, please do not hesitate to contact me.    Sincerely,    BREANN Conrad MD

## 2025-05-06 LAB
ESTROGEN SERPL-MCNC: NORMAL PG/ML
INSULIN SERPL-ACNC: NORMAL U[IU]/ML
LAB AP GROSS DESCRIPTION: NORMAL
LAB AP REPORT FOOTNOTES: NORMAL

## 2025-05-12 ENCOUNTER — TELEPHONE (OUTPATIENT)
Dept: GYNECOLOGY | Facility: CLINIC | Age: 65
End: 2025-05-12
Payer: MEDICARE

## 2025-05-12 NOTE — TELEPHONE ENCOUNTER
Called Laina Macias to review results of endometrial biopsy.     1. Endometrium,  biopsy:   - Benign endometrial polyp admixed with benign strips of superficial endometrium.       2. Cervical polyp:   - Benign endocervical tissue with chronic inflammation.      Discussed benign results but recommendation for further evaluation with hysteroscopy D&C in setting of PMB and thickened endometrial stripe. Patient confirms understanding of results. Patient's information sent to preop team who will reach out for scheduling preop appt and surgery date.

## 2025-05-13 ENCOUNTER — TELEPHONE (OUTPATIENT)
Dept: GYNECOLOGY | Facility: CLINIC | Age: 65
End: 2025-05-13
Payer: MEDICARE

## 2025-05-13 NOTE — TELEPHONE ENCOUNTER
After you speak with this pt and choose a SX date for her [facility],  let me know and I will set up her pre-op visit.                ----- Message from Taylor Conrad sent at 5/12/2025  8:34 AM CDT -----  Regarding: [facility] SURESH&C  Hello,[facility] D&C for a PMB with benign EMB please add to preop list  ----- Message -----  From: Taylor Conrad MD  Sent: 5/12/2025   8:36 AM CDT  To: Radha Lopez LPN; [facility] Gynecology Surge#  Subject: [facility] D&C                                          Hello,[facility] D&C for a PMB with benign EMB

## 2025-05-18 ENCOUNTER — TELEPHONE (OUTPATIENT)
Dept: GYNECOLOGY | Facility: CLINIC | Age: 65
End: 2025-05-18
Payer: MEDICARE

## 2025-05-18 NOTE — TELEPHONE ENCOUNTER
MD phone call to discuss surgical planning. Called x2. No answer. Left HIPAA compliant voicemail.  Will try again.  Rubin Mayen MD PGY3  Obstetrics & Gynecology

## 2025-05-22 ENCOUNTER — TELEPHONE (OUTPATIENT)
Dept: GYNECOLOGY | Facility: CLINIC | Age: 65
End: 2025-05-22
Payer: MEDICARE

## 2025-05-22 DIAGNOSIS — R93.89 THICKENED ENDOMETRIUM: ICD-10-CM

## 2025-05-22 DIAGNOSIS — N84.0 ENDOMETRIAL POLYP: ICD-10-CM

## 2025-05-22 DIAGNOSIS — N95.0 PMB (POSTMENOPAUSAL BLEEDING): Primary | ICD-10-CM

## 2025-05-22 NOTE — TELEPHONE ENCOUNTER
MD phone call to discuss surgical management of PMB via [facility] D&C, polypectomy.. Confirmed identity with . Discussed pre-op date of  and surgery date of .  Patient offered earlier date, however could not do it, due to work.  Case request in place.  Message sent to schedule pre-op visit.  Rubin Mayen MD PGY3  Obstetrics & Gynecology

## 2025-05-27 ENCOUNTER — TELEPHONE (OUTPATIENT)
Dept: GYNECOLOGY | Facility: CLINIC | Age: 65
End: 2025-05-27
Payer: MEDICARE

## 2025-05-27 NOTE — TELEPHONE ENCOUNTER
GYN PRE-OP  7/9/25 @ 2:30 pm  HSC w/Polypectomy 7/24/25        ----- Message from Rubin Mayen sent at 5/22/2025  6:54 PM CDT -----  Regarding: Pre-op 7/16  Hello,Can we get this lady in for pre-op on 7/16 for HSC D&C polypectomy, for PMB and thickened endometrium?Thank you.Rubin Mayen MD [id number]

## 2025-07-07 NOTE — PROGRESS NOTES
[facility] Gynecology Preoperative Visit History and Physical    HPI:   64 y.o.  with PMB that occurred on two occasions 2025; reports this was spotting and has had no additional episodes since that time.  She is followed by urology for microscopic hematuria with overall negative workup (cystoscopy, urine cytology). She is UTD on colonoscopy. She denies post-coital bleeding, pelvic pain, or abnormal vaginal discharge .        GynHx:    Contraception: None  Pap Hx: history of HPV16+ pap (); Last pap 2025, HPVneg  STI Hx: Denies    OB History    Para Term  AB Living   4 3 3 0 1 3   SAB IAB Ectopic Multiple Live Births   0 0 1 0 3      # Outcome Date GA Lbr Kamlesh/2nd Weight Sex Type Anes PTL Lv   4 Ectopic            3 Term            2 Term            1 Term               Obstetric Comments   CSX3   Ex-lap for surgical removal of ectopic pregnancy/salpingectomy       Past Medical History:   Diagnosis Date    Diverticulosis     HLD (hyperlipidemia)     Hydronephrosis     Hypertension     Personal history of colonic polyps     PMB (postmenopausal bleeding)     Renal cyst        Current Outpatient Medications   Medication Instructions    amLODIPine (NORVASC) 10 mg, Oral, Daily    atorvastatin (LIPITOR) 20 mg, Oral, Nightly    conjugated estrogens (PREMARIN) 0.5 g, Vaginal, Twice weekly, Apply dime size to gloved finger and apply to vaginal walls nightly 2x/week.    lisinopriL-hydrochlorothiazide (PRINZIDE,ZESTORETIC) 20-12.5 mg per tablet 1 tablet, Oral, Daily    meloxicam (MOBIC) 7.5 mg, Oral, 2 times daily PRN    potassium chloride SA (K-DUR,KLOR-CON) 20 MEQ tablet 20 mEq, Oral, Daily       Past Surgical History:   Procedure Laterality Date     SECTION      x3    COLONOSCOPY  10/21/2019    COLONOSCOPY, WITH POLYPECTOMY USING SNARE N/A 2023    Procedure: COLONOSCOPY, WITH POLYPECTOMY USING SNARE;  Surgeon: Herbert Hurtado MD;  Location: [facility] ENDOSCOPY;  Service: Endoscopy;   "Laterality: N/A;    ectopic pregnancy N/A     open         Family History   Problem Relation Name Age of Onset    Diabetes type II Mother Molly Macias     Hypertension Mother Molly Macias     Diabetes Mother Molly Macias    Denies family history of breast, ovarian, uterine, or colon cancer    Social History[1]  Lives with: lives alone; will have daughter drive   Work:  at a school     Review of patient's allergies indicates:  No Known Allergies    Review of Systems: As stated in HPI.      Objective:     Vitals:    25 1439   BP: 130/80   Pulse: 64   Resp: 14   Temp: 98.4 °F (36.9 °C)   SpO2: 100%   Weight: 95.3 kg (210 lb 3.2 oz)   Height: 5' 3" (1.6 m)     Body mass index is 37.24 kg/m².    PHYSICAL EXAM  GEN: NAD, A&O x3  CV: RRR   LUNGS: CTABL  ABDOMEN: soft, NTND, no masses  INCISIONS: well-healed midline vertical incision   VAGINA: Moist, no lesions or masses  VULVA: Normal external genitalia  CERVIX: Well visualized without any masses or lesions. No CMT. Os normal in appearance without bleeding or discharge.   UTERUS: uterus approximately 8 cm in size, retroverted; good mobility, but minimal descent; approximatley 6cm between ischial spines and 3cm below pubic arch  ADNEXA: No fullness, masses or tenderness  RECTAL:  Normal, No masses; normal tone; no gross blood.    LABS:  H/H (2024) 14.6/41  TSH (2024) 1.683    IMAGING:  TVUS (my interpretation): Uterus approximately 9cm in size, EMS approximately 8-9mm with focal cystic area, likely polyp; myometrial calcifications within anterior body of uterus     HCM  Mammogram/breast biopsy: (2025) papilloma; benign, return to routine screening  DXA(2024): normal BMD  Colonoscopy: (2023)  adenomatous polyps, repeat 3 years   Pap: (2025) NILM HPVneg    Assessment:      64 y.o.  with PMB and thickened EMS, going for hysteroscopy D&C.     Problem List Items Addressed This Visit    None  Visit Diagnoses         Pre-operative " examination    -  Primary      PMB (postmenopausal bleeding)          Thickened endometrium                Plan:     Counseling: Alternatives to this planned procedure were explained to the patient including expectant management, medical management, or serial ultrasounds with risk of disease progression to malignancy. This procedure and its risks, reasons, benefits and complications (including perforation, subsequent injury to bowel, air/gas embolism, fluid overload, major blood vessel,hemorrhage, possibility of transfusion, infection, scarring, dyspareunia, further surgery, failure of the procedure) were reviewed in detail. Complication rate less than 1% overall.   We have reviewed the typical perioperative course and post-operative precautions and restrictions have been reviewed.    Transfusion of blood and blood products discussed. Patient was consented for blood transfusion in the case of an emergency.  She understands the possibility of blood transfusion reaction and the attendant risk of transmission of HIV & Hepatitis C to be 1 in 2 million and the risk of Hepatitis B to be 1 in 200,000.  She is aware of the possibility of transfusion reaction. All questions were answered.   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  She has consented to an exam under anesthesia and understands that medical students participate in this portion of the procedure.  All questions were answered.    Preop testing ordered. Surgical consents signed.  Instructions reviewed, including NPO after midnight.   Counseled to hold the following medications on the day of surgery: per anesthesia  Current contraception: BTL    To OR for HSC D&C with Dr. Mercado   Scheduled on 7.24.25    Discussed with Dr. Regine Jones MD   PGY3, Obstetrics & Gynecology   [facility]             [1]   Social History  Socioeconomic History    Marital  status: Single   Tobacco Use    Smoking status: Never     Passive exposure: Never    Smokeless tobacco: Never   Substance and Sexual Activity    Alcohol use: Yes     Comment: holidays    Drug use: Never    Sexual activity: Not Currently     Partners: Male     Birth control/protection: See Surgical Hx     Comment: hx BTL     Social Drivers of Health     Financial Resource Strain: High Risk (3/17/2025)    Overall Financial Resource Strain (CARDIA)     Difficulty of Paying Living Expenses: Hard   Food Insecurity: Food Insecurity Present (3/17/2025)    Hunger Vital Sign     Worried About Running Out of Food in the Last Year: Sometimes true     Ran Out of Food in the Last Year: Never true   Transportation Needs: No Transportation Needs (3/17/2025)    PRAPARE - Transportation     Lack of Transportation (Medical): No     Lack of Transportation (Non-Medical): No   Physical Activity: Sufficiently Active (3/17/2025)    Exercise Vital Sign     Days of Exercise per Week: 5 days     Minutes of Exercise per Session: 30 min   Stress: Stress Concern Present (3/17/2025)    [employer] [place] [employer] - Occupational Stress Questionnaire     Feeling of Stress : Rather much   Housing Stability: Low Risk  (3/17/2025)    Housing Stability Vital Sign     Unable to Pay for Housing in the Last Year: No     Number of Times Moved in the Last Year: 0     Homeless in the Last Year: No

## 2025-07-07 NOTE — H&P (VIEW-ONLY)
[facility] Preoperative Visit History and Physical    HPI:   64 y.o.  with PMB that occurred on two occasions 2025; reports this was spotting and has had no additional episodes since that time.  She is followed by urology for microscopic hematuria with overall negative workup (cystoscopy, urine cytology). She is UTD on colonoscopy. She denies post-coital bleeding, pelvic pain, or abnormal vaginal discharge .        GynHx:    Contraception: None  Pap Hx: history of HPV16+ pap (); Last pap 2025, HPVneg  STI Hx: Denies    OB History    Para Term  AB Living   4 3 3 0 1 3   SAB IAB Ectopic Multiple Live Births   0 0 1 0 3      # Outcome Date GA Lbr Kamlesh/2nd Weight Sex Type Anes PTL Lv   4 Ectopic            3 Term            2 Term            1 Term               Obstetric Comments   CSX3   Ex-lap for surgical removal of ectopic pregnancy/salpingectomy       Past Medical History:   Diagnosis Date    Diverticulosis     HLD (hyperlipidemia)     Hydronephrosis     Hypertension     Personal history of colonic polyps     PMB (postmenopausal bleeding)     Renal cyst        Current Outpatient Medications   Medication Instructions    amLODIPine (NORVASC) 10 mg, Oral, Daily    atorvastatin (LIPITOR) 20 mg, Oral, Nightly    conjugated estrogens (PREMARIN) 0.5 g, Vaginal, Twice weekly, Apply dime size to gloved finger and apply to vaginal walls nightly 2x/week.    lisinopriL-hydrochlorothiazide (PRINZIDE,ZESTORETIC) 20-12.5 mg per tablet 1 tablet, Oral, Daily    meloxicam (MOBIC) 7.5 mg, Oral, 2 times daily PRN    potassium chloride SA (K-DUR,KLOR-CON) 20 MEQ tablet 20 mEq, Oral, Daily       Past Surgical History:   Procedure Laterality Date     SECTION      x3    COLONOSCOPY  10/21/2019    COLONOSCOPY, WITH POLYPECTOMY USING SNARE N/A 2023    Procedure: COLONOSCOPY, WITH POLYPECTOMY USING SNARE;  Surgeon: Herbert Hurtado MD;  Location: [facility] ENDOSCOPY;  Service: Endoscopy;   "Laterality: N/A;    ectopic pregnancy N/A     open         Family History   Problem Relation Name Age of Onset    Diabetes type II Mother Molly Macias     Hypertension Mother Molly Macias     Diabetes Mother Molly Macias    Denies family history of breast, ovarian, uterine, or colon cancer    Social History[1]  Lives with: lives alone; will have daughter drive   Work:  at a school     Review of patient's allergies indicates:  No Known Allergies    Review of Systems: As stated in HPI.      Objective:     Vitals:    25 1439   BP: 130/80   Pulse: 64   Resp: 14   Temp: 98.4 °F (36.9 °C)   SpO2: 100%   Weight: 95.3 kg (210 lb 3.2 oz)   Height: 5' 3" (1.6 m)     Body mass index is 37.24 kg/m².    PHYSICAL EXAM  GEN: NAD, A&O x3  CV: RRR   LUNGS: CTABL  ABDOMEN: soft, NTND, no masses  INCISIONS: well-healed midline vertical incision   VAGINA: Moist, no lesions or masses  VULVA: Normal external genitalia  CERVIX: Well visualized without any masses or lesions. No CMT. Os normal in appearance without bleeding or discharge.   UTERUS: uterus approximately 8 cm in size, retroverted; good mobility, but minimal descent; approximatley 6cm between ischial spines and 3cm below pubic arch  ADNEXA: No fullness, masses or tenderness  RECTAL:  Normal, No masses; normal tone; no gross blood.    LABS:  H/H (2024) 14.6/41  TSH (2024) 1.683    IMAGING:  TVUS (my interpretation): Uterus approximately 9cm in size, EMS approximately 8-9mm with focal cystic area, likely polyp; myometrial calcifications within anterior body of uterus     HCM  Mammogram/breast biopsy: (2025) papilloma; benign, return to routine screening  DXA(2024): normal BMD  Colonoscopy: (2023)  adenomatous polyps, repeat 3 years   Pap: (2025) NILM HPVneg    Assessment:      64 y.o.  with PMB and thickened EMS, going for hysteroscopy D&C.     Problem List Items Addressed This Visit    None  Visit Diagnoses         Pre-operative " examination    -  Primary      PMB (postmenopausal bleeding)          Thickened endometrium                Plan:     Counseling: Alternatives to this planned procedure were explained to the patient including expectant management, medical management, or serial ultrasounds with risk of disease progression to malignancy. This procedure and its risks, reasons, benefits and complications (including perforation, subsequent injury to bowel, air/gas embolism, fluid overload, major blood vessel,hemorrhage, possibility of transfusion, infection, scarring, dyspareunia, further surgery, failure of the procedure) were reviewed in detail. Complication rate less than 1% overall.   We have reviewed the typical perioperative course and post-operative precautions and restrictions have been reviewed.    Transfusion of blood and blood products discussed. Patient was consented for blood transfusion in the case of an emergency.  She understands the possibility of blood transfusion reaction and the attendant risk of transmission of HIV & Hepatitis C to be 1 in 2 million and the risk of Hepatitis B to be 1 in 200,000.  She is aware of the possibility of transfusion reaction. All questions were answered.   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  She has consented to an exam under anesthesia and understands that medical students participate in this portion of the procedure.  All questions were answered.    Preop testing ordered. Surgical consents signed.  Instructions reviewed, including NPO after midnight.   Counseled to hold the following medications on the day of surgery: per anesthesia  Current contraception: BTL    To OR for HSC D&C with Dr. Mercado   Scheduled on 7.24.25    Discussed with Dr. Regine Jones MD   PGY3, Obstetrics & Gynecology   [facility]             [1]   Social History  Socioeconomic History    Marital  status: Single   Tobacco Use    Smoking status: Never     Passive exposure: Never    Smokeless tobacco: Never   Substance and Sexual Activity    Alcohol use: Yes     Comment: holidays    Drug use: Never    Sexual activity: Not Currently     Partners: Male     Birth control/protection: See Surgical Hx     Comment: hx BTL     Social Drivers of Health     Financial Resource Strain: High Risk (3/17/2025)    Overall Financial Resource Strain (CARDIA)     Difficulty of Paying Living Expenses: Hard   Food Insecurity: Food Insecurity Present (3/17/2025)    Hunger Vital Sign     Worried About Running Out of Food in the Last Year: Sometimes true     Ran Out of Food in the Last Year: Never true   Transportation Needs: No Transportation Needs (3/17/2025)    PRAPARE - Transportation     Lack of Transportation (Medical): No     Lack of Transportation (Non-Medical): No   Physical Activity: Sufficiently Active (3/17/2025)    Exercise Vital Sign     Days of Exercise per Week: 5 days     Minutes of Exercise per Session: 30 min   Stress: Stress Concern Present (3/17/2025)    [employer] [place] [employer] - Occupational Stress Questionnaire     Feeling of Stress : Rather much   Housing Stability: Low Risk  (3/17/2025)    Housing Stability Vital Sign     Unable to Pay for Housing in the Last Year: No     Number of Times Moved in the Last Year: 0     Homeless in the Last Year: No      debility

## 2025-07-09 ENCOUNTER — OFFICE VISIT (OUTPATIENT)
Dept: GYNECOLOGY | Facility: CLINIC | Age: 65
End: 2025-07-09
Payer: MEDICARE

## 2025-07-09 VITALS
OXYGEN SATURATION: 100 % | TEMPERATURE: 98 F | BODY MASS INDEX: 37.24 KG/M2 | WEIGHT: 210.19 LBS | RESPIRATION RATE: 14 BRPM | DIASTOLIC BLOOD PRESSURE: 80 MMHG | HEART RATE: 64 BPM | SYSTOLIC BLOOD PRESSURE: 130 MMHG | HEIGHT: 63 IN

## 2025-07-09 DIAGNOSIS — R93.89 THICKENED ENDOMETRIUM: ICD-10-CM

## 2025-07-09 DIAGNOSIS — Z01.818 PRE-OPERATIVE EXAMINATION: Primary | ICD-10-CM

## 2025-07-09 DIAGNOSIS — N95.0 PMB (POSTMENOPAUSAL BLEEDING): ICD-10-CM

## 2025-07-09 PROCEDURE — 99213 OFFICE O/P EST LOW 20 MIN: CPT | Mod: PBBFAC

## 2025-07-13 DIAGNOSIS — I10 PRIMARY HYPERTENSION: ICD-10-CM

## 2025-07-16 ENCOUNTER — ANESTHESIA EVENT (OUTPATIENT)
Dept: SURGERY | Facility: HOSPITAL | Age: 65
End: 2025-07-16
Payer: MEDICARE

## 2025-07-16 NOTE — ANESTHESIA PREPROCEDURE EVALUATION
Laina Macias is a 64 y.o. female PRESENTING FOR POLYPECTOMY, UTERUS, HYSTEROSCOPIC (Abdomen)       HYSTEROSCOPY, WITH DILATION AND CURETTAGE OF UTERUS (Abdomen)  with a history of   - PMB (postmenopausal bleeding) [N95.0]       Thickened endometrium [R93.89]       Endometrial polyp [N84.0]   -MSH  -HTN  -HLD  -DIVERTICULOSIS/CLN POLYP  -RENAL CYST  -OBESITY, BMI 37    BETA-BLOCKER: NONE    New Orders for Anesthesia: NONE    Problem List[1]    Past Surgical History:   Procedure Laterality Date     SECTION      x3    COLONOSCOPY  10/21/2019    COLONOSCOPY, WITH POLYPECTOMY USING SNARE N/A 2023    Procedure: COLONOSCOPY, WITH POLYPECTOMY USING SNARE;  Surgeon: Herbert Hurtado MD;  Location: [facility] ENDOSCOPY;  Service: Endoscopy;  Laterality: N/A;    ectopic pregnancy N/A     open       Lab Results   Component Value Date    WBC 7.52 2024    HGB 14.6 2024    HCT 41.8 2024     2024       CMP  Sodium   Date Value Ref Range Status   2025 143 136 - 145 mmol/L Final     Potassium   Date Value Ref Range Status   2025 3.4 (L) 3.5 - 5.1 mmol/L Final     Chloride   Date Value Ref Range Status   2025 109 (H) 98 - 107 mmol/L Final     CO2   Date Value Ref Range Status   2025 26 23 - 31 mmol/L Final     Glucose   Date Value Ref Range Status   2025 113 82 - 115 mg/dL Final     Blood Urea Nitrogen   Date Value Ref Range Status   2025 16.8 9.8 - 20.1 mg/dL Final     Creatinine   Date Value Ref Range Status   2025 0.79 0.55 - 1.02 mg/dL Final     Calcium   Date Value Ref Range Status   2025 9.2 8.4 - 10.2 mg/dL Final     Protein Total   Date Value Ref Range Status   2024 7.4 5.8 - 7.6 gm/dL Final     Albumin   Date Value Ref Range Status   2024 3.5 3.4 - 4.8 g/dL Final     Bilirubin Total   Date Value Ref Range Status   2024 0.3 <=1.5 mg/dL Final     ALP   Date Value Ref Range Status   2024 55 40 - 150 unit/L Final      AST   Date Value Ref Range Status   09/12/2024 15 5 - 34 unit/L Final     ALT   Date Value Ref Range Status   09/12/2024 16 0 - 55 unit/L Final     eGFR   Date Value Ref Range Status   03/13/2025 >60 mL/min/1.73/m2 Final     Comment:     Estimated GFR calculated using the CKD-EPI creatinine (2021) equation.       Current Outpatient Medications   Medication Instructions    amLODIPine (NORVASC) 10 mg, Oral, Daily    atorvastatin (LIPITOR) 20 mg, Oral, Nightly    conjugated estrogens (PREMARIN) 0.5 g, Vaginal, Twice weekly, Apply dime size to gloved finger and apply to vaginal walls nightly 2x/week.    lisinopriL-hydrochlorothiazide (PRINZIDE,ZESTORETIC) 20-12.5 mg per tablet 1 tablet, Oral, Daily    meloxicam (MOBIC) 7.5 mg, Oral, 2 times daily PRN    potassium chloride SA (K-DUR,KLOR-CON) 20 MEQ tablet 20 mEq, Oral, Daily       Past anesthesia records:         Pre-op Assessment    I have reviewed the Patient Summary Reports.     I have reviewed the Nursing Notes. I have reviewed the NPO Status.   I have reviewed the Medications.     Review of Systems  Anesthesia Hx:  No problems with previous Anesthesia   History of prior surgery of interest to airway management or planning:  Previous anesthesia: General Ectopic Preg with general anesthesia.       Airway issues documented on chart review include GETA     Denies Family Hx of Anesthesia complications.    Denies Personal Hx of Anesthesia complications.                    Social:  Non-Smoker, No Alcohol Use       Hematology/Oncology:  Hematology Normal   Oncology Normal                                   EENT/Dental:  EENT/Dental Normal           Cardiovascular:     Denies Pacemaker. Hypertension, well controlled           hyperlipidemia     Patient not on beta blockers                    Hypertension         Pulmonary:  Pulmonary Normal        Denies Sleep Apnea.                Renal/:  Chronic Renal Disease        Kidney Function/Disease              Hepatic/GI:  Hepatic/GI Normal        Not Taking GLP-1 Agonists            Musculoskeletal:  Musculoskeletal Normal                Neurological:    Neuromuscular Disease,                                 Neuromuscular Disease   Endocrine:  Endocrine Normal          Obesity / BMI > 30  Dermatological:  Skin Normal    Psych:  Psychiatric Normal                    Physical Exam  General: Alert and Cooperative    Airway:  Mallampati: II / II  Mouth Opening: Normal  TM Distance: Normal  Tongue: Normal  Neck ROM: Normal ROM    Dental:  Intact  Px denies any loose teeth. Some missing teeth  Chest/Lungs:  Normal Respiratory Rate    Heart:  Rate: Normal  Rhythm: Regular Rhythm    Abdomen:  Normal, Soft        Anesthesia Plan  Type of Anesthesia, risks & benefits discussed:    Anesthesia Type: Gen ETT  Intra-op Monitoring Plan: Standard ASA Monitors  Post Op Pain Control Plan: multimodal analgesia  Induction:  IV  Airway Plan: Direct  Informed Consent: Informed consent signed with the Patient and all parties understand the risks and agree with anesthesia plan.  All questions answered.   ASA Score: 3  Day of Surgery Review of History & Physical: H&P Update referred to the surgeon/provider.I have interviewed and examined the patient. I have reviewed the patient's H&P dated:     Ready For Surgery From Anesthesia Perspective.     .           [1]   Patient Active Problem List  Diagnosis    Bilateral hand numbness    Bilateral hand pain    Cardiomegaly    Chest pain    Ganglion cyst    Heel pain, bilateral    Hematuria    HLD (hyperlipidemia)    HTN (hypertension)    Hypokalemia    Left leg pain    Severe obesity (BMI 35.0-39.9) with comorbidity    Positive FIT (fecal immunochemical test)    Prediabetes    Pulmonary nodule    Recurrent kidney stones    Right shoulder pain    Vitamin D deficiency    Well adult exam    Breast cancer screening by mammogram    Adenomatous polyp of sigmoid colon    Adenomatous rectal polyp    Sciatica  of left side    PMB (postmenopausal bleeding)    Thickened endometrium

## 2025-07-21 ENCOUNTER — HOSPITAL ENCOUNTER (OUTPATIENT)
Dept: RADIOLOGY | Facility: HOSPITAL | Age: 65
Discharge: HOME | End: 2025-07-21
Attending: NURSE PRACTITIONER
Payer: MEDICARE

## 2025-07-21 DIAGNOSIS — R31.21 ASYMPTOMATIC MICROSCOPIC HEMATURIA: ICD-10-CM

## 2025-07-21 PROCEDURE — 76770 US EXAM ABDO BACK WALL COMP: CPT | Mod: TC

## 2025-07-21 RX ORDER — AMLODIPINE BESYLATE 10 MG/1
10 TABLET ORAL
Qty: 90 TABLET | Refills: 1 | Status: SHIPPED | OUTPATIENT
Start: 2025-07-21

## 2025-07-23 ENCOUNTER — PATIENT MESSAGE (OUTPATIENT)
Dept: SURGERY | Facility: HOSPITAL | Age: 65
End: 2025-07-23
Payer: MEDICARE

## 2025-07-23 ENCOUNTER — LAB VISIT (OUTPATIENT)
Dept: LAB | Facility: HOSPITAL | Age: 65
End: 2025-07-23
Attending: NURSE PRACTITIONER
Payer: MEDICARE

## 2025-07-23 ENCOUNTER — OFFICE VISIT (OUTPATIENT)
Dept: INTERNAL MEDICINE | Facility: CLINIC | Age: 65
End: 2025-07-23
Payer: MEDICARE

## 2025-07-23 VITALS
DIASTOLIC BLOOD PRESSURE: 81 MMHG | HEIGHT: 63 IN | HEART RATE: 70 BPM | BODY MASS INDEX: 37.56 KG/M2 | SYSTOLIC BLOOD PRESSURE: 138 MMHG | RESPIRATION RATE: 18 BRPM | TEMPERATURE: 98 F | WEIGHT: 212 LBS

## 2025-07-23 DIAGNOSIS — Z12.31 BREAST CANCER SCREENING BY MAMMOGRAM: ICD-10-CM

## 2025-07-23 DIAGNOSIS — Z00.00 WELL ADULT EXAM: ICD-10-CM

## 2025-07-23 DIAGNOSIS — I10 PRIMARY HYPERTENSION: ICD-10-CM

## 2025-07-23 DIAGNOSIS — E78.2 MIXED HYPERLIPIDEMIA: ICD-10-CM

## 2025-07-23 DIAGNOSIS — E87.6 HYPOKALEMIA: ICD-10-CM

## 2025-07-23 DIAGNOSIS — M54.32 SCIATICA OF LEFT SIDE: ICD-10-CM

## 2025-07-23 DIAGNOSIS — R73.03 PREDIABETES: Primary | ICD-10-CM

## 2025-07-23 LAB
ALBUMIN SERPL-MCNC: 3.4 G/DL (ref 3.4–4.8)
ALBUMIN/GLOB SERPL: 0.8 RATIO (ref 1.1–2)
ALP SERPL-CCNC: 52 UNIT/L (ref 40–150)
ALT SERPL-CCNC: 17 UNIT/L (ref 0–55)
ANION GAP SERPL CALC-SCNC: 6 MEQ/L
AST SERPL-CCNC: 18 UNIT/L (ref 11–45)
BILIRUB SERPL-MCNC: 0.4 MG/DL
BUN SERPL-MCNC: 15.8 MG/DL (ref 9.8–20.1)
CALCIUM SERPL-MCNC: 8.8 MG/DL (ref 8.4–10.2)
CHLORIDE SERPL-SCNC: 108 MMOL/L (ref 98–107)
CHOLEST SERPL-MCNC: 130 MG/DL
CHOLEST/HDLC SERPL: 4 {RATIO} (ref 0–5)
CO2 SERPL-SCNC: 27 MMOL/L (ref 23–31)
CREAT SERPL-MCNC: 0.82 MG/DL (ref 0.55–1.02)
CREAT/UREA NIT SERPL: 19
GFR SERPLBLD CREATININE-BSD FMLA CKD-EPI: >60 ML/MIN/1.73/M2
GLOBULIN SER-MCNC: 4.5 GM/DL (ref 2.4–3.5)
GLUCOSE SERPL-MCNC: 114 MG/DL (ref 82–115)
HBA1C MFR BLD: 5.8 %
HDLC SERPL-MCNC: 36 MG/DL (ref 35–60)
LDLC SERPL CALC-MCNC: 28 MG/DL (ref 50–140)
POTASSIUM SERPL-SCNC: 3.2 MMOL/L (ref 3.5–5.1)
PROT SERPL-MCNC: 7.9 GM/DL (ref 5.8–7.6)
SODIUM SERPL-SCNC: 141 MMOL/L (ref 136–145)
TRIGL SERPL-MCNC: 329 MG/DL (ref 37–140)
TSH SERPL-ACNC: 2.6 UIU/ML (ref 0.35–4.94)
VLDLC SERPL CALC-MCNC: 66 MG/DL

## 2025-07-23 PROCEDURE — 83036 HEMOGLOBIN GLYCOSYLATED A1C: CPT | Mod: PBBFAC | Performed by: NURSE PRACTITIONER

## 2025-07-23 PROCEDURE — 80061 LIPID PANEL: CPT

## 2025-07-23 PROCEDURE — 36415 COLL VENOUS BLD VENIPUNCTURE: CPT

## 2025-07-23 PROCEDURE — 99213 OFFICE O/P EST LOW 20 MIN: CPT | Mod: PBBFAC | Performed by: NURSE PRACTITIONER

## 2025-07-23 PROCEDURE — 99214 OFFICE O/P EST MOD 30 MIN: CPT | Mod: S$PBB,,, | Performed by: NURSE PRACTITIONER

## 2025-07-23 PROCEDURE — 80053 COMPREHEN METABOLIC PANEL: CPT

## 2025-07-23 PROCEDURE — 84443 ASSAY THYROID STIM HORMONE: CPT

## 2025-07-23 RX ORDER — LISINOPRIL 20 MG/1
20 TABLET ORAL DAILY
Qty: 90 TABLET | Refills: 3 | Status: SHIPPED | OUTPATIENT
Start: 2025-07-23 | End: 2026-07-23

## 2025-07-23 RX ORDER — ATORVASTATIN CALCIUM 10 MG/1
20 TABLET, FILM COATED ORAL NIGHTLY
Qty: 90 TABLET | Refills: 1 | Status: SHIPPED | OUTPATIENT
Start: 2025-07-23 | End: 2026-07-23

## 2025-07-23 RX ORDER — MELOXICAM 7.5 MG/1
7.5 TABLET ORAL 2 TIMES DAILY PRN
Qty: 60 TABLET | Refills: 3 | Status: SHIPPED | OUTPATIENT
Start: 2025-07-23

## 2025-07-23 NOTE — PROGRESS NOTES
Patient ID: [id number]     Chief Complaint: Hypertension        HPI:     HPI      Laina Macias is a 64 y.o. female here today for a follow up.         Optometrist:  Dentist:  Breast Cancer Screening:  [unfilled]   Cervical Cancer Screening:     Colorectal Cancer Screening:  Diabetic Eye Exam:  Diabetic Foot Exam:  Lung Cancer Screening:    Prostate Cancer Screening:  AAA Screening:  Osteoporosis Screening:  Medicare Wellness:   Immunizations:   Immunization History   Administered Date(s) Administered    COVID-19 MRNA, LN-S PF (MODERNA HALF 0.25 ML DOSE) 2021, 2022    COVID-19, MRNA, LN-S, PF (MODERNA FULL 0.5 ML DOSE) 2021, 2021    Influenza - Quadrivalent - PF *Preferred* (6 months and older) 2023, 2023    Pneumococcal Conjugate - 20 Valent 2024    Tdap 2017        -------------------------------------    Diverticulosis    HLD (hyperlipidemia)    Hydronephrosis    Hypertension    Personal history of colonic polyps    PMB (postmenopausal bleeding)    Renal cyst        Past Surgical History:   Procedure Laterality Date     SECTION      x3    COLONOSCOPY  10/21/2019    COLONOSCOPY, WITH POLYPECTOMY USING SNARE N/A 2023    Procedure: COLONOSCOPY, WITH POLYPECTOMY USING SNARE;  Surgeon: Herbert Hurtado MD;  Location: [facility] ENDOSCOPY;  Service: Endoscopy;  Laterality: N/A;    ectopic pregnancy N/A     open       Review of patient's allergies indicates:  No Known Allergies    Current Outpatient Medications   Medication Instructions    amLODIPine (NORVASC) 10 mg, Oral    atorvastatin (LIPITOR) 20 mg, Oral, Nightly    conjugated estrogens (PREMARIN) 0.5 g, Vaginal, Twice weekly, Apply dime size to gloved finger and apply to vaginal walls nightly 2x/week.    meloxicam (MOBIC) 7.5 mg, Oral, 2 times daily PRN       Social History[1]     Family History   Problem Relation Name Age of Onset    Diabetes type II Mother Molly Macias     Hypertension  "Mother Molly Macias     Diabetes Mother Molly Macias         Patient Care Team:  Dea Campbell FNP as PCP - General (Family Medicine)  Stephanie Perry LPN as Care Coordinator     Subjective:     Review of Systems     See HPI for details    Constitutional: Denies Change in appetite. Denies Chills. Denies Fever. Denies Night sweats.  Eye: Denies Blurred vision. Denies Discharge. Denies Eye pain.  ENT: Denies Decreased hearing. Denies Sore throat. Denies Swollen glands.  Respiratory: Denies Cough. Denies Shortness of breath. Denies Shortness of breath with exertion. Denies Wheezing.  Cardiovascular: DeniesChest pain at rest. Denies Chest pain with exertion. Denies Irregular heartbeat. Denies Palpitations. Denies Edema.  Gastrointestinal: Denies Abdominal pain. DeniesDiarrhea. Denies Nausea. Denies Vomiting. Denies Hematemesis or Hematochezia.  Genitourinary: Denies Dysuria. Denies Urinary frequency. Denies Urinary urgency. Denies Blood in urine.  Endocrine: Denies Cold intolerance. Denies Excessive thirst. Denies Heat intolerance. Denies Weight loss. Denies Weight gain.  Musculoskeletal: Denies Painful joints. Denies Weakness.  Integumentary: Denies Rash. Denies Itching. Denies Dry skin.  Neurologic: Denies Dizziness. Denies Fainting. Denies Headache.  Psychiatric: Denies Depression. Denies Anxiety. Denies Suicidal/Homicidal ideations.    All Other ROS: Negative except as stated in HPI.       Objective:     Visit Vitals  /81   Pulse 70   Temp 97.8 °F (36.6 °C) (Oral)   Resp 18   Ht 5' 3" (1.6 m)   Wt 96.2 kg (212 lb)   BMI 37.55 kg/m²       Physical Exam    General: Alert and oriented, No acute distress.  Head: Normocephalic, Atraumatic.  Eye: Pupils are equal, round and reactive to light, Extraocular movements are intact, Sclera non-icteric.  Ears/Nose/Throat: Normal, Mucosa moist,Clear.  Neck/Thyroid: Supple, Non-tender, No carotid bruit, No lymphadenopathy, No JVD, Full range of " motion.  Respiratory: Clear to auscultation bilaterally; No wheezes, rales or rhonchi,Non-labored respirations, Symmetrical chest wall expansion.  Cardiovascular: Regular rate and rhythm, S1/S2 normal, No murmurs, rubs or gallops.  Gastrointestinal: Soft, Non-tender, Non-distended, Normal bowel sounds, No palpable organomegaly.  Musculoskeletal: Normal range of motion.  Integumentary: Warm, Dry, Intact, No suspicious lesions or rashes.  Extremities: No clubbing, cyanosis or edema  Neurologic: No focal deficits, Cranial Nerves II-XII are grossly intact, Motor strength normal upper and lower extremities, Sensory exam intact.  Psychiatric: Normal interaction, Coherent speech, Euthymic mood, Appropriate affect       Labs Reviewed:     Chemistry:  Lab Results   Component Value Date     07/23/2025    K 3.2 (L) 07/23/2025    BUN 15.8 07/23/2025    CREATININE 0.82 07/23/2025    EGFRNORACEVR >60 07/23/2025    CALCIUM 8.8 07/23/2025    ALKPHOS 52 07/23/2025    ALBUMIN 3.4 07/23/2025    BILIDIR 0.1 05/08/2021    IBILI 0.30 05/08/2021    AST 18 07/23/2025    ALT 17 07/23/2025    AIKEKHCA42GM 39.3 05/08/2021        Lab Results   Component Value Date    HGBA1C 5.5 02/21/2024        Hematology:  Lab Results   Component Value Date    WBC 7.52 09/12/2024    HGB 14.6 09/12/2024    HCT 41.8 09/12/2024     09/12/2024       Lipid Panel:  Lab Results   Component Value Date    CHOL 130 07/23/2025    HDL 36 07/23/2025    LDL 28.00 (L) 07/23/2025    TRIG 329 (H) 07/23/2025    TOTALCHOLEST 4 07/23/2025        Urine:  Lab Results   Component Value Date    APPEARANCEUA Clear 08/08/2023    SGUA 1.016 08/08/2023    PROTEINUA 2+ (A) 08/08/2023    KETONESUA Negative 08/08/2023    LEUKOCYTESUR Negative 08/08/2023    RBCUA 6-10 (A) 08/08/2023    WBCUA 0-5 08/08/2023    BACTERIA None Seen 08/08/2023    SQEPUA Trace (A) 08/08/2023    HYALINECASTS None Seen 08/08/2023        Assessment:       ICD-10-CM ICD-9-CM   1. Prediabetes  R73.03  790.29   2. Primary hypertension  I10 401.9   3. Mixed hyperlipidemia  E78.2 272.2   4. Hypokalemia  E87.6 276.8   5. Well adult exam  Z00.00 V70.0   6. Breast cancer screening by mammogram  Z12.31 V76.12   7. Sciatica of left side  M54.32 724.3        Plan:     1. Prediabetes (Primary)  A1c 5.8. ADA diet and exercise.   - POCT HEMOGLOBIN A1C    2. Primary hypertension  BP controlled. Pt still having hypokalemia on potassium supplement. Will D/C HCTZ and potassium supplement. Cont Lisinopril 20 mg 1 tab po daily and Amlodipine 10 mg 1 tab po daily. RTC in 3-4 weeks for BP check and repeat potassium level.     3. Mixed hyperlipidemia  Low fat diet and exercise. Encouraged stricter dietary management. Cont Atorvastatin as prescribed. FLP in 6 months.    4. Hypokalemia  Pt still having hypokalemia on potassium supplement. Will D/C HCTZ and potassium supplement. Cont Lisinopril 20 mg 1 tab po daily and Amlodipine 10 mg 1 tab po daily. RTC in 3-4 weeks for BP check and repeat potassium level.     5. Well adult exam  Labs in 4 months. MMG with jamal in 6 months to resume annual screening MMG. Keep GYN cl appts. Colonoscopy done 1-26-23 polyps X 4 no malignancy, suggest repeat in 3 years- due 1/2026.     6. Breast cancer screening by mammogram  MMG with JAMAL in 6 months to resume annual MMG screenings.  - Mammo Digital Screening Bilat w/ Jamal (XPD); Future    7. Sciatica of left side  Improved per patient. Cont Meloxicam as prescribed prn pain.          Follow up in about 4 months (around 11/23/2025) for with labs 1 week prior to appt. . In addition to their scheduled follow up, the patient has also been instructed to follow up on as needed basis.     Future Appointments   Date Time Provider Department Center   8/13/2025  2:45 PM [facility], [facility] GYN [facility] GYN Elian Pan   10/21/2025  8:30 AM Jatinder Pires, [place] [facility] UROAYLA Plummer Un        ROSA Callahan           [1]   Social History  Socioeconomic History     Marital status: Single   Tobacco Use    Smoking status: Never     Passive exposure: Never    Smokeless tobacco: Never   Substance and Sexual Activity    Alcohol use: Yes     Comment: holidays    Drug use: Never    Sexual activity: Not Currently     Partners: Male     Birth control/protection: See Surgical Hx     Comment: hx BTL     Social Drivers of Health     Financial Resource Strain: High Risk (3/17/2025)    Overall Financial Resource Strain (CARDIA)     Difficulty of Paying Living Expenses: Hard   Food Insecurity: Food Insecurity Present (3/17/2025)    Hunger Vital Sign     Worried About Running Out of Food in the Last Year: Sometimes true     Ran Out of Food in the Last Year: Never true   Transportation Needs: No Transportation Needs (3/17/2025)    PRAPARE - Transportation     Lack of Transportation (Medical): No     Lack of Transportation (Non-Medical): No   Physical Activity: Sufficiently Active (3/17/2025)    Exercise Vital Sign     Days of Exercise per Week: 5 days     Minutes of Exercise per Session: 30 min   Stress: Stress Concern Present (3/17/2025)    [employer] [place] [employer] - Occupational Stress Questionnaire     Feeling of Stress : Rather much   Housing Stability: Low Risk  (3/17/2025)    Housing Stability Vital Sign     Unable to Pay for Housing in the Last Year: No     Number of Times Moved in the Last Year: 0     Homeless in the Last Year: No

## 2025-07-24 ENCOUNTER — ANESTHESIA (OUTPATIENT)
Dept: SURGERY | Facility: HOSPITAL | Age: 65
End: 2025-07-24
Payer: MEDICARE

## 2025-07-24 ENCOUNTER — HOSPITAL ENCOUNTER (OUTPATIENT)
Facility: HOSPITAL | Age: 65
Discharge: HOME | End: 2025-07-24
Attending: OBSTETRICS & GYNECOLOGY | Admitting: OBSTETRICS & GYNECOLOGY
Payer: MEDICARE

## 2025-07-24 DIAGNOSIS — N84.0 ENDOMETRIAL POLYP: ICD-10-CM

## 2025-07-24 DIAGNOSIS — R93.89 THICKENED ENDOMETRIUM: ICD-10-CM

## 2025-07-24 DIAGNOSIS — N95.0 POSTMENOPAUSAL BLEEDING: ICD-10-CM

## 2025-07-24 DIAGNOSIS — N95.0 PMB (POSTMENOPAUSAL BLEEDING): Primary | ICD-10-CM

## 2025-07-24 LAB — POCT GLUCOSE: 121 MG/DL (ref 70–110)

## 2025-07-24 PROCEDURE — C1782 MORCELLATOR: HCPCS | Performed by: OBSTETRICS & GYNECOLOGY

## 2025-07-24 PROCEDURE — 27201423 OPTIME MED/SURG SUP & DEVICES STERILE SUPPLY: Performed by: OBSTETRICS & GYNECOLOGY

## 2025-07-24 PROCEDURE — 37000008 HC ANESTHESIA 1ST 15 MINUTES: Performed by: OBSTETRICS & GYNECOLOGY

## 2025-07-24 PROCEDURE — 25000003 PHARM REV CODE 250

## 2025-07-24 PROCEDURE — 71000015 HC POSTOP RECOV 1ST HR: Performed by: OBSTETRICS & GYNECOLOGY

## 2025-07-24 PROCEDURE — 36000706: Performed by: OBSTETRICS & GYNECOLOGY

## 2025-07-24 PROCEDURE — 88305 TISSUE EXAM BY PATHOLOGIST: CPT | Mod: TC,91 | Performed by: OBSTETRICS & GYNECOLOGY

## 2025-07-24 PROCEDURE — 36000707: Performed by: OBSTETRICS & GYNECOLOGY

## 2025-07-24 PROCEDURE — 63600175 PHARM REV CODE 636 W HCPCS

## 2025-07-24 PROCEDURE — 37000009 HC ANESTHESIA EA ADD 15 MINS: Performed by: OBSTETRICS & GYNECOLOGY

## 2025-07-24 PROCEDURE — 63600175 PHARM REV CODE 636 W HCPCS: Performed by: ANESTHESIOLOGY

## 2025-07-24 PROCEDURE — 71000033 HC RECOVERY, INTIAL HOUR: Performed by: OBSTETRICS & GYNECOLOGY

## 2025-07-24 PROCEDURE — 71000016 HC POSTOP RECOV ADDL HR: Performed by: OBSTETRICS & GYNECOLOGY

## 2025-07-24 RX ORDER — SCOPOLAMINE 1 MG/3D
1 PATCH, EXTENDED RELEASE TRANSDERMAL
Status: DISCONTINUED | OUTPATIENT
Start: 2025-07-24 | End: 2025-07-24 | Stop reason: HOSPADM

## 2025-07-24 RX ORDER — SODIUM CHLORIDE, SODIUM LACTATE, POTASSIUM CHLORIDE, CALCIUM CHLORIDE 600; 310; 30; 20 MG/100ML; MG/100ML; MG/100ML; MG/100ML
INJECTION, SOLUTION INTRAVENOUS CONTINUOUS
Status: DISCONTINUED | OUTPATIENT
Start: 2025-07-24 | End: 2025-07-24 | Stop reason: HOSPADM

## 2025-07-24 RX ORDER — MUPIROCIN 20 MG/G
OINTMENT TOPICAL
Status: DISCONTINUED | OUTPATIENT
Start: 2025-07-24 | End: 2025-07-24 | Stop reason: HOSPADM

## 2025-07-24 RX ORDER — PROPOFOL 10 MG/ML
VIAL (ML) INTRAVENOUS
Status: DISCONTINUED | OUTPATIENT
Start: 2025-07-24 | End: 2025-07-24

## 2025-07-24 RX ORDER — ONDANSETRON HYDROCHLORIDE 2 MG/ML
4 INJECTION, SOLUTION INTRAVENOUS ONCE
Status: COMPLETED | OUTPATIENT
Start: 2025-07-24 | End: 2025-07-24

## 2025-07-24 RX ORDER — MIDAZOLAM HYDROCHLORIDE 2 MG/2ML
2 INJECTION, SOLUTION INTRAMUSCULAR; INTRAVENOUS
Status: COMPLETED | OUTPATIENT
Start: 2025-07-24 | End: 2025-07-24

## 2025-07-24 RX ORDER — GLUCAGON 1 MG
1 KIT INJECTION
Status: DISCONTINUED | OUTPATIENT
Start: 2025-07-24 | End: 2025-07-24 | Stop reason: HOSPADM

## 2025-07-24 RX ORDER — CELECOXIB 200 MG/1
200 CAPSULE ORAL
Status: COMPLETED | OUTPATIENT
Start: 2025-07-24 | End: 2025-07-24

## 2025-07-24 RX ORDER — ONDANSETRON HYDROCHLORIDE 2 MG/ML
INJECTION, SOLUTION INTRAMUSCULAR; INTRAVENOUS
Status: DISCONTINUED | OUTPATIENT
Start: 2025-07-24 | End: 2025-07-24

## 2025-07-24 RX ORDER — IBUPROFEN 600 MG/1
600 TABLET, FILM COATED ORAL EVERY 6 HOURS PRN
Qty: 56 TABLET | Refills: 0 | Status: SHIPPED | OUTPATIENT
Start: 2025-07-24 | End: 2025-08-07

## 2025-07-24 RX ORDER — HYDROMORPHONE HYDROCHLORIDE 1 MG/ML
0.5 INJECTION, SOLUTION INTRAMUSCULAR; INTRAVENOUS; SUBCUTANEOUS EVERY 5 MIN PRN
Status: DISCONTINUED | OUTPATIENT
Start: 2025-07-24 | End: 2025-07-24 | Stop reason: HOSPADM

## 2025-07-24 RX ORDER — FENTANYL CITRATE 50 UG/ML
INJECTION, SOLUTION INTRAMUSCULAR; INTRAVENOUS
Status: DISCONTINUED | OUTPATIENT
Start: 2025-07-24 | End: 2025-07-24

## 2025-07-24 RX ORDER — SUCCINYLCHOLINE CHLORIDE 20 MG/ML
INJECTION INTRAMUSCULAR; INTRAVENOUS
Status: DISCONTINUED | OUTPATIENT
Start: 2025-07-24 | End: 2025-07-24

## 2025-07-24 RX ORDER — ONDANSETRON HYDROCHLORIDE 2 MG/ML
4 INJECTION, SOLUTION INTRAVENOUS ONCE AS NEEDED
Status: DISCONTINUED | OUTPATIENT
Start: 2025-07-24 | End: 2025-07-24 | Stop reason: HOSPADM

## 2025-07-24 RX ORDER — LIDOCAINE HYDROCHLORIDE 20 MG/ML
INJECTION, SOLUTION EPIDURAL; INFILTRATION; INTRACAUDAL; PERINEURAL
Status: DISCONTINUED | OUTPATIENT
Start: 2025-07-24 | End: 2025-07-24

## 2025-07-24 RX ORDER — DEXAMETHASONE SODIUM PHOSPHATE 4 MG/ML
INJECTION, SOLUTION INTRA-ARTICULAR; INTRALESIONAL; INTRAMUSCULAR; INTRAVENOUS; SOFT TISSUE
Status: DISCONTINUED | OUTPATIENT
Start: 2025-07-24 | End: 2025-07-24

## 2025-07-24 RX ORDER — SODIUM CHLORIDE 9 MG/ML
INJECTION, SOLUTION INTRAVENOUS CONTINUOUS
Status: DISCONTINUED | OUTPATIENT
Start: 2025-07-24 | End: 2025-07-24 | Stop reason: HOSPADM

## 2025-07-24 RX ORDER — ROCURONIUM BROMIDE 10 MG/ML
INJECTION, SOLUTION INTRAVENOUS
Status: DISCONTINUED | OUTPATIENT
Start: 2025-07-24 | End: 2025-07-24

## 2025-07-24 RX ORDER — SODIUM CHLORIDE, SODIUM LACTATE, POTASSIUM CHLORIDE, CALCIUM CHLORIDE 600; 310; 30; 20 MG/100ML; MG/100ML; MG/100ML; MG/100ML
INJECTION, SOLUTION INTRAVENOUS CONTINUOUS
Status: ACTIVE | OUTPATIENT
Start: 2025-07-24 | End: 2025-07-24

## 2025-07-24 RX ORDER — FAMOTIDINE 20 MG/1
20 TABLET, FILM COATED ORAL
Status: COMPLETED | OUTPATIENT
Start: 2025-07-24 | End: 2025-07-24

## 2025-07-24 RX ORDER — GABAPENTIN 300 MG/1
300 CAPSULE ORAL
Status: COMPLETED | OUTPATIENT
Start: 2025-07-24 | End: 2025-07-24

## 2025-07-24 RX ORDER — MISOPROSTOL 200 UG/1
200 TABLET ORAL
Status: COMPLETED | OUTPATIENT
Start: 2025-07-24 | End: 2025-07-24

## 2025-07-24 RX ORDER — ACETAMINOPHEN 500 MG
1000 TABLET ORAL
Status: COMPLETED | OUTPATIENT
Start: 2025-07-24 | End: 2025-07-24

## 2025-07-24 RX ADMIN — HYDROMORPHONE HYDROCHLORIDE 0.5 MG: 1 INJECTION, SOLUTION INTRAMUSCULAR; INTRAVENOUS; SUBCUTANEOUS at 02:07

## 2025-07-24 RX ADMIN — LIDOCAINE HYDROCHLORIDE 100 MG: 20 INJECTION, SOLUTION EPIDURAL; INFILTRATION; INTRACAUDAL; PERINEURAL at 01:07

## 2025-07-24 RX ADMIN — PROPOFOL 180 MG: 10 INJECTION, EMULSION INTRAVENOUS at 01:07

## 2025-07-24 RX ADMIN — SODIUM CHLORIDE, POTASSIUM CHLORIDE, SODIUM LACTATE AND CALCIUM CHLORIDE: 600; 310; 30; 20 INJECTION, SOLUTION INTRAVENOUS at 09:07

## 2025-07-24 RX ADMIN — CELECOXIB 200 MG: 200 CAPSULE ORAL at 11:07

## 2025-07-24 RX ADMIN — FENTANYL CITRATE 50 MCG: 50 INJECTION, SOLUTION INTRAMUSCULAR; INTRAVENOUS at 01:07

## 2025-07-24 RX ADMIN — MISOPROSTOL 200 MCG: 200 TABLET ORAL at 11:07

## 2025-07-24 RX ADMIN — SCOPOLAMINE 1 PATCH: 1 PATCH TRANSDERMAL at 11:07

## 2025-07-24 RX ADMIN — ONDANSETRON 4 MG: 2 INJECTION INTRAMUSCULAR; INTRAVENOUS at 11:07

## 2025-07-24 RX ADMIN — ROCURONIUM BROMIDE 50 MG: 10 INJECTION INTRAVENOUS at 01:07

## 2025-07-24 RX ADMIN — DEXAMETHASONE SODIUM PHOSPHATE 8 MG: 4 INJECTION, SOLUTION INTRA-ARTICULAR; INTRALESIONAL; INTRAMUSCULAR; INTRAVENOUS; SOFT TISSUE at 01:07

## 2025-07-24 RX ADMIN — MIDAZOLAM HYDROCHLORIDE 2 MG: 1 INJECTION, SOLUTION INTRAMUSCULAR; INTRAVENOUS at 01:07

## 2025-07-24 RX ADMIN — GABAPENTIN 300 MG: 300 CAPSULE ORAL at 11:07

## 2025-07-24 RX ADMIN — ACETAMINOPHEN 1000 MG: 500 TABLET ORAL at 11:07

## 2025-07-24 RX ADMIN — SUCCINYLCHOLINE CHLORIDE 140 MG: 20 INJECTION, SOLUTION INTRAMUSCULAR; INTRAVENOUS; PARENTERAL at 01:07

## 2025-07-24 RX ADMIN — FAMOTIDINE 20 MG: 20 TABLET, FILM COATED ORAL at 11:07

## 2025-07-24 RX ADMIN — SUGAMMADEX 400 MG: 100 INJECTION, SOLUTION INTRAVENOUS at 01:07

## 2025-07-24 RX ADMIN — ONDANSETRON 4 MG: 2 INJECTION INTRAMUSCULAR; INTRAVENOUS at 02:07

## 2025-07-24 NOTE — BRIEF OP NOTE
[facility] - Periop Services  Brief Operative Note    Surgery Date: 7/24/2025     Surgeons and Role:  Tatum Mercado MD - Primary  Kaila Jones MD - Resident  Backer-Leonid Syed DO - Resident    Assisting Surgeon: None    Pre-op Diagnosis: PMB, Thickened endometrium    Post-op Diagnosis:  PMB, Thickened endometrium, endometrial and endocervical polyps.    Procedure: Hysteroscopy, Hysteroscopic Polypectomy, D&C    Anesthesia: General    Operative Findings:   EUA: Normal female external genitalia without lesion. Uterus 11 week size, mobile, anteverted, smooth in contour. Mobile uterus, cervix 4 cm from introitus. 4 cm below pubic arch, 8-9 cm between ischial spines. No adnexal masses or fullness. Vaginal vault without lesion, discharge, or bleeding. Cervix visualized without gross lesion or erythema.   Hysteroscopy: Cervical canal notable for multiple subcentimeter pedunculated polyps. Atrophic appearing, pale endometrium visualized globally. However, endometrial cavity with multiple, approximately 5-10 mm size polyps; one large 1 cm sessile polyp from anterior left fundal endometrium and another right posterior wall 1 cm sessile polyp; one 5 mm pedunculated polyp near posterior lower uterine segment  Samples taken via myosure tissue shaver of endometrial and endocervical polyps and separate endometrial curettage. Tubal ostia visualized without lesion bilaterally.                               Estimated Blood Loss: < 5 mL    Hysteroscopy Fluid Deficit: 230 mL (NS as distension media)    IVF: 600 mL crystalloid         Specimens:   Specimen (24h ago, onward)       Start     Ordered    07/24/25 1351  Specimen to Pathology  RELEASE UPON ORDERING        References:    Click here for ordering Quick Tip   Question:  Release to patient  Answer:  Immediate    07/24/25 1351                    ID Type Source Tests Collected by Time Destination   A : endometrial and endocervical polyps Tissue Endometrium SPECIMEN TO  PATHOLOGY Tatum Mercado MD 7/24/2025 9117    B : endometrial currettings Tissue Endometrium SPECIMEN TO PATHOLOGY Tatum Mercado MD 7/24/2025 4948            Discharge Note    OUTCOME: Patient tolerated treatment/procedure well without complication and is now ready for discharge.    DISPOSITION: Home or Self Care    FINAL DIAGNOSIS:  PMB, thickened endometrium, endometrial and endocervical polyps    FOLLOWUP: 2 week post-op telehealth visit    DISCHARGE INSTRUCTIONS:    Discharge Procedure Orders   Diet Adult Regular     Other restrictions (specify):   Order Comments: Nothing per vagina, no bathing, soaking in water, or pool for 4 weeks.     Notify your health care provider if you experience any of the following:   Order Comments: Heavy vaginal bleeding soaking more than 1 pad per hour.     Notify your health care provider if you experience any of the following:  increased confusion or weakness     Notify your health care provider if you experience any of the following:  persistent dizziness, light-headedness, or visual disturbances     Notify your health care provider if you experience any of the following:  worsening rash     Notify your health care provider if you experience any of the following:  severe persistent headache     Notify your health care provider if you experience any of the following:  difficulty breathing or increased cough     Notify your health care provider if you experience any of the following:  redness, tenderness, or signs of infection (pain, swelling, redness, odor or green/yellow discharge around incision site)     Notify your health care provider if you experience any of the following:  severe uncontrolled pain     Notify your health care provider if you experience any of the following:  persistent nausea and vomiting or diarrhea     Notify your health care provider if you experience any of the following:  temperature >100.4     No dressing needed     Activity as tolerated        Leonid Heaton D.O.  PGY-2  Obstetrics & Gynecology  [facility]

## 2025-07-24 NOTE — OP NOTE
[facility] - Peri Services  Gynecology  Operative Note      Date of Procedure: 7/24/2025     Procedure: Hysteroscopy, Hysteroscopic Polypectomy, D&C     Surgeons and Role:  Tatum Mercado MD - Primary  Kaila Jones MD - Resident  Backer-Leonid Syed DO - Resident    Assisting Surgeon: None    Pre-op Diagnosis: PMB, Thickened endometrium     Post-op Diagnosis:  PMB, Thickened endometrium, endometrial and endocervical polyps    Anesthesia: General    Operative Findings (including complications, if any):   EUA: Normal female external genitalia without lesion. Uterus 11 week size, mobile, anteverted, smooth in contour. Mobile uterus, cervix 4 cm from introitus. 4 cm below pubic arch, 8-9 cm between ischial spines. No adnexal masses or fullness. Vaginal vault without lesion, discharge, or bleeding. Cervix visualized without gross lesion or erythema.   Hysteroscopy: Cervical canal notable for multiple subcentimeter pedunculated polyps. Atrophic appearing, pale endometrium visualized globally. However, endometrial cavity with multiple, approximately 5-10 mm size polyps; one large 1 cm sessile polyp from anterior left fundal endometrium and another right posterior wall 1 cm sessile polyp; one 5 mm pedunculated polyp near posterior lower uterine segment  Samples taken via myosure tissue shaver of endometrial and endocervical polyps and separate endometrial curettage. Tubal ostia visualized without lesion bilaterally.                               Description of Technical Procedures:     Procedure:  The patient was taken to the operating room with IVF running. SCDs were placed on bilateral lower extremities for DVT prophylaxis.  General anesthesia was obtained without difficulty and found to be adequate.  She was placed in the dorsal lithotomy position with legs in Martin type stirrups.  Exam under anesthesia revealed the findings as above. She was prepped and draped in the normal sterile fashion. A weighted  speculum was placed into posterior aspect of the vagina. A right angle retractor was placed into the anterior aspect of the vagina. The anterior lip of the cervix was grasped with a single-toothed tenaculum.  The cervix was sequentially dilated to 6 mm using Hegar dialators. A 6.9 mm Myosure 0 degree hysteroscope was introduced into the cervix with hydrodistension. The above findings were then noted. Photographs were taken of the endometrial cavity. The Myosure tissue shaver was introduced into the hysteroscope and thereafter used to methodically resect the endometrial and endocervical polyps noted in findings. The hysteroscope was then removed. The uterus was curetted in a clockwise fashion until a gritty feeling was noted in all aspects of the uterus. The endometrial and endocervical polyps and endometrial curettings were sent to pathology. The tenaculum was removed from the cervix and good hemostasis was noted at puncture sites.     The patient tolerated the procedure well. The instrument and sponge counts were correct times two. The patient awaked from anesthesia and was taken to recovery in stable condition.    Dr. Mercado was present for the entire procedure.     Significant Surgical Tasks Conducted by the Assistant(s), if Applicable: N/A    Estimated Blood Loss: < 5 mL     Hysteroscopy Fluid Deficit: 230 mL (NS as distension media)     IVF: 600 mL crystalloid           Implants: None    Specimens:   Specimen (24h ago, onward)       Start     Ordered    07/24/25 1351  Specimen to Pathology Gynecology and Obstetrics  RELEASE UPON ORDERING        References:    Click here for ordering Quick Tip   Question:  Release to patient  Answer:  Immediate    07/24/25 1351                   ID Type Source Tests Collected by Time Destination   A : endometrial and endocervical polyps Tissue Endometrium SPECIMEN TO PATHOLOGY Tatum Mercado MD 7/24/2025 7169    B : endometrial currettings Tissue Endometrium SPECIMEN TO  PATHOLOGY Tatum Mercado MD 7/24/2025 5721               Condition: Good    Disposition: PACU - hemodynamically stable.    Attestation: Performed under direct supervision of Dr. Mercado who was present for the duration of the procedure.    Discharge Note    OUTCOME: Patient tolerated treatment/procedure well without complication and is now ready for discharge.    DISPOSITION: Home or Self Care    FINAL DIAGNOSIS:  PMB, Thickened endometrium, endometrial and endocervical polyps    FOLLOWUP: 2 weeks telehealth post-op visit    DISCHARGE INSTRUCTIONS:    Discharge Procedure Orders   Diet Adult Regular     Other restrictions (specify):   Order Comments: Nothing per vagina, no bathing, soaking in water, or pool for 4 weeks.     Notify your health care provider if you experience any of the following:   Order Comments: Heavy vaginal bleeding soaking more than 1 pad per hour.     Notify your health care provider if you experience any of the following:  increased confusion or weakness     Notify your health care provider if you experience any of the following:  persistent dizziness, light-headedness, or visual disturbances     Notify your health care provider if you experience any of the following:  worsening rash     Notify your health care provider if you experience any of the following:  severe persistent headache     Notify your health care provider if you experience any of the following:  difficulty breathing or increased cough     Notify your health care provider if you experience any of the following:  redness, tenderness, or signs of infection (pain, swelling, redness, odor or green/yellow discharge around incision site)     Notify your health care provider if you experience any of the following:  severe uncontrolled pain     Notify your health care provider if you experience any of the following:  persistent nausea and vomiting or diarrhea     Notify your health care provider if you experience any of the  following:  temperature >100.4     No dressing needed     Activity as tolerated       Leonid Heaton D.O.  PGY-2  Obstetrics & Gynecology  [facility]

## 2025-07-24 NOTE — ANESTHESIA PROCEDURE NOTES
Intubation    Date/Time: 7/24/2025 1:25 PM    Performed by: Lisa Harris  Authorized by: Charlene Woody MD    Intubation:     Induction:  Intravenous    Intubated:  Postinduction    Mask Ventilation:  Easy mask    Attempts:  1    Attempted By:  Student and CRNA    Method of Intubation:  Direct    Blade:  Jensen 2    Laryngeal View Grade: Grade III - only epiglottis visible      Difficult Airway Encountered?: No      Complications:  None    Airway Device:  Supraglottic airway/LMA    Airway Device Size:  7.5    Style/Cuff Inflation:  Uncuffed    Inflation Amount (mL):  6    Tube secured:  22    Secured at:  The lips    Placement Verified By:  Capnometry    Complicating Factors:  None    Findings Post-Intubation:  BS equal bilateral and atraumatic/condition of teeth unchanged

## 2025-07-24 NOTE — LETTER
July 25, 2025    Laina Macias  [address] [place] [address]                   [address] [facility] [address]  Phone: [phone]  Fax: [phone]   July 25, 2025     Patient: Laina Macias   YOB: 1960   Date of Visit: 7/24/2025       To Whom it May Concern:    Laina Macias was seen in outpatient surgery on 7/24/2025. She may return to work on 7/28/2025    Please excuse her from any  work missed.    If you have any questions or concerns, please don't hesitate to call.    Sincerely,         Dustin LOPES R.N./Outpatient Surgery/[phone]

## 2025-07-24 NOTE — INTERVAL H&P NOTE
H&P Interval Update    Patient seen and evaluated by myself and the staff attending this morning. There have been no changes since her preop visit with Dr. Jones and Dr. Mercado (see below). The patient is able to express in her own words the procedure undergoing on today and wishes to proceed. No changes in PMH/ED visits/Hospital admissions since last visit (H&P noted below). NPO since 10 PM yesterday. She is here today with Maribel (daughter), who can be reached at [phone]. Her preferred pharmacy is [facility].    To the OR for hysteroscopy, D&C, and possible hysteroscopic polypectomy for PMB.      Leonid Heaton D.O.  PGY-2  Obstetrics & Gynecology  [facility]

## 2025-07-24 NOTE — ANESTHESIA POSTPROCEDURE EVALUATION
Anesthesia Post Evaluation    Patient: Laina Macias    Procedure(s) Performed: Procedure(s) (LRB):  POLYPECTOMY, UTERUS, HYSTEROSCOPIC (N/A)  HYSTEROSCOPY, WITH DILATION AND CURETTAGE OF UTERUS (N/A)    Final Anesthesia Type: general      Patient location during evaluation: PACU  Patient participation: Yes- Able to Participate  Level of consciousness: awake and alert, awake and oriented  Post-procedure vital signs: reviewed and stable  Pain management: adequate  Airway patency: patent    PONV status at discharge: No PONV  Anesthetic complications: no      Cardiovascular status: blood pressure returned to baseline, hemodynamically stable and stable  Respiratory status: unassisted, spontaneous ventilation and room air  Hydration status: euvolemic  Follow-up not needed.              Vitals Value Taken Time   /79 07/24/25 14:18   Temp 36.1 °C (96.9 °F) 07/24/25 14:13   Pulse 70 07/24/25 14:18   Resp 13 07/24/25 14:30   SpO2 98 % 07/24/25 14:18         No case tracking events are documented in the log.      Pain/Melida Score: Pain Rating Prior to Med Admin: 6 (7/24/2025  2:30 PM)  Melida Score: 5 (7/24/2025  2:13 PM)

## 2025-07-24 NOTE — TRANSFER OF CARE
"Anesthesia Transfer of Care Note    Patient: Laina Macias    Procedure(s) Performed: Procedure(s) (LRB):  POLYPECTOMY, UTERUS, HYSTEROSCOPIC (N/A)  HYSTEROSCOPY, WITH DILATION AND CURETTAGE OF UTERUS (N/A)    Patient location: PACU    Anesthesia Type: general    Transport from OR: Transported from OR on room air with adequate spontaneous ventilation    Post pain: adequate analgesia    Post assessment: no apparent anesthetic complications    Post vital signs: stable    Level of consciousness: awake    Nausea/Vomiting: no nausea/vomiting    Complications: none    Transfer of care protocol was followed      Last vitals: Visit Vitals  BP (!) 159/95   Pulse 76   Temp 36.3 °C (97.4 °F) (Oral)   Resp 20   Ht 5' 3" (1.6 m)   Wt 94.9 kg (209 lb 3.2 oz)   SpO2 97%   Breastfeeding No   BMI 37.06 kg/m²     "

## 2025-07-24 NOTE — DISCHARGE INSTRUCTIONS
Hysteroscopy/ d and c    · Keep TELEMEDICINE follow up appointment _August 13, 2025 @ 2:45 PM_ with [facility]. Resume home medications unless otherwise instructed by your doctor.    · Pelvic rest for _4 weeks___ (NO baths, NO soaking, tampons, douches or sex).    · Activity as tolerated  · · Take pain medication as prescribed. If you are experiencing severe pain even with your pain medications, please call the Womens clinic at [phone] to notify your doctor.    · Take over the counter laxatives such as Miralax for constipation. Do not strain to have a bowel movement.    · Brace belly with pillow when coughing/sneezing/deep breathing.    · No driving or consuming alcohol for the next 24 hours.    · Some bleeding/spotting is to be expected for several days.    · Notify MD of bleeding more than 1 pad per hour, any moderate to severe pain unrelieved by pain medicine or for any signs of infection including fever above 100.4, yellow/green foul-smelling drainage, nausea or vomiting. Clinics number: [phone], or after business hours or emergency call [phone].    · Thanks for choosing [facility]! Have a smooth recovery!

## 2025-07-28 VITALS
BODY MASS INDEX: 37.07 KG/M2 | TEMPERATURE: 97 F | HEART RATE: 63 BPM | DIASTOLIC BLOOD PRESSURE: 78 MMHG | RESPIRATION RATE: 20 BRPM | OXYGEN SATURATION: 94 % | HEIGHT: 63 IN | WEIGHT: 209.19 LBS | SYSTOLIC BLOOD PRESSURE: 147 MMHG

## 2025-08-02 ENCOUNTER — DOCUMENTATION ONLY (OUTPATIENT)
Dept: GYNECOLOGY | Facility: CLINIC | Age: 65
End: 2025-08-02
Payer: MEDICARE

## 2025-08-02 NOTE — PROGRESS NOTES
Surgery Date: 7/24/2025      Patient Name: BREANN Macias (65 yo)      Pre-Op Diagnosis: PMB, endometrial polyp      Procedure: Hysteroscopy, [facility] polypectomy, D&C      Path:   1. Endometrium, endometrial and endocervical polyps:    - Fragments of endometrial polyp.    - No evidence of malignancy.        2. Endometrium, endometrial currettings:    - Scant strips of endometrial epithelium with predominantly blood.       Plan: If recurrent PMB, recommend definitive management (TVH vs LAVH)     Leonid Hetaon D.O.  PGY-2  Obstetrics & Gynecology  [facility]

## (undated) DEVICE — Device

## (undated) DEVICE — FORMALIN 120ML PREFILLED CONT

## (undated) DEVICE — PACK FLUENT DISPOSABLE

## (undated) DEVICE — PENS LAB MARKING BLACK

## (undated) DEVICE — COVER HANDLE LIGHT RIGID

## (undated) DEVICE — POSITIONER HEAD SUPINE PINK

## (undated) DEVICE — SOL NACL IRR 3000ML

## (undated) DEVICE — PAD PREP CUFFED NS 24X48IN

## (undated) DEVICE — SPONGE GAUZE 16PLY 4X4

## (undated) DEVICE — TRAP ETRAP POLYP 50 TRAY

## (undated) DEVICE — GOWN POLY REINF BRTH SLV XL

## (undated) DEVICE — SOL IRRI STRL WATER 1000ML

## (undated) DEVICE — MANIFOLD 4 PORT

## (undated) DEVICE — PAD CURAD NONADH 3X4IN

## (undated) DEVICE — SEAL LENS SCOPE MYOSURE

## (undated) DEVICE — ELECTRODE PATIENT RETURN DISP

## (undated) DEVICE — KIT SURGICAL TURNOVER

## (undated) DEVICE — TRAY SKIN SCRUB WET PREMIUM

## (undated) DEVICE — DRAPE UNDERBUTTOCKS PCH STRL

## (undated) DEVICE — SOLIDIFIER BOTTLE 1500CC

## (undated) DEVICE — DEVICE MYOSURE LITE TISS REM

## (undated) DEVICE — MARKER WRITESITE SKIN CHLRAPRP

## (undated) DEVICE — JELLY SURGILUBE LUBE PKT 3GM

## (undated) DEVICE — SPONGE LAP 18X18 PREWASHED

## (undated) DEVICE — TOWEL OR DISP STRL BLUE 4/PK

## (undated) DEVICE — KIT SURGICAL COLON .25 1.1OZ

## (undated) DEVICE — PACK SURG LITHOTOMY 3 SIRUS

## (undated) DEVICE — DRAPE UINDERBUT GRAD PCH